# Patient Record
Sex: MALE | Race: WHITE | NOT HISPANIC OR LATINO | Employment: OTHER | URBAN - METROPOLITAN AREA
[De-identification: names, ages, dates, MRNs, and addresses within clinical notes are randomized per-mention and may not be internally consistent; named-entity substitution may affect disease eponyms.]

---

## 2019-09-18 ENCOUNTER — COMMUNICATION - HEALTHEAST (OUTPATIENT)
Dept: SCHEDULING | Facility: CLINIC | Age: 64
End: 2019-09-18

## 2019-10-02 ENCOUNTER — COMMUNICATION - HEALTHEAST (OUTPATIENT)
Dept: SCHEDULING | Facility: CLINIC | Age: 64
End: 2019-10-02

## 2019-10-10 ENCOUNTER — OFFICE VISIT - HEALTHEAST (OUTPATIENT)
Dept: FAMILY MEDICINE | Facility: CLINIC | Age: 64
End: 2019-10-10

## 2019-10-10 DIAGNOSIS — E78.00 PURE HYPERCHOLESTEROLEMIA: ICD-10-CM

## 2019-10-10 DIAGNOSIS — R73.9 HYPERGLYCEMIA: ICD-10-CM

## 2019-10-10 DIAGNOSIS — R80.8 OTHER PROTEINURIA: ICD-10-CM

## 2019-10-10 DIAGNOSIS — E11.9 TYPE 2 DIABETES MELLITUS WITHOUT COMPLICATION, WITHOUT LONG-TERM CURRENT USE OF INSULIN (H): ICD-10-CM

## 2019-10-10 DIAGNOSIS — I10 ESSENTIAL HYPERTENSION, BENIGN: ICD-10-CM

## 2019-10-10 DIAGNOSIS — I10 HTN (HYPERTENSION): ICD-10-CM

## 2019-10-10 DIAGNOSIS — Z12.11 SCREEN FOR COLON CANCER: ICD-10-CM

## 2019-10-10 LAB
ANION GAP SERPL CALCULATED.3IONS-SCNC: 9 MMOL/L (ref 5–18)
BUN SERPL-MCNC: 14 MG/DL (ref 8–22)
CALCIUM SERPL-MCNC: 9.9 MG/DL (ref 8.5–10.5)
CHLORIDE BLD-SCNC: 99 MMOL/L (ref 98–107)
CO2 SERPL-SCNC: 26 MMOL/L (ref 22–31)
CREAT SERPL-MCNC: 1.02 MG/DL (ref 0.7–1.3)
CREAT UR-MCNC: 95.1 MG/DL
GFR SERPL CREATININE-BSD FRML MDRD: >60 ML/MIN/1.73M2
GLUCOSE BLD-MCNC: 220 MG/DL (ref 70–125)
HBA1C MFR BLD: 9 % (ref 3.5–6)
LDLC SERPL CALC-MCNC: 147 MG/DL
MICROALBUMIN UR-MCNC: 40.63 MG/DL (ref 0–1.99)
MICROALBUMIN/CREAT UR: 427.2 MG/G
POTASSIUM BLD-SCNC: 4.2 MMOL/L (ref 3.5–5)
SODIUM SERPL-SCNC: 134 MMOL/L (ref 136–145)

## 2019-10-10 ASSESSMENT — MIFFLIN-ST. JEOR: SCORE: 1612.93

## 2019-10-11 ENCOUNTER — COMMUNICATION - HEALTHEAST (OUTPATIENT)
Dept: FAMILY MEDICINE | Facility: CLINIC | Age: 64
End: 2019-10-11

## 2020-07-16 ENCOUNTER — OFFICE VISIT - HEALTHEAST (OUTPATIENT)
Dept: FAMILY MEDICINE | Facility: CLINIC | Age: 65
End: 2020-07-16

## 2020-07-16 DIAGNOSIS — E78.5 HYPERLIPIDEMIA, UNSPECIFIED HYPERLIPIDEMIA TYPE: ICD-10-CM

## 2020-07-16 DIAGNOSIS — I10 ESSENTIAL HYPERTENSION: ICD-10-CM

## 2020-07-16 DIAGNOSIS — I63.9 ACUTE EMBOLIC STROKE (H): ICD-10-CM

## 2020-07-16 DIAGNOSIS — E11.8 CONTROLLED TYPE 2 DIABETES MELLITUS WITH COMPLICATION, WITHOUT LONG-TERM CURRENT USE OF INSULIN (H): ICD-10-CM

## 2020-07-16 DIAGNOSIS — R80.8 OTHER PROTEINURIA: ICD-10-CM

## 2020-07-16 DIAGNOSIS — Z09 HOSPITAL DISCHARGE FOLLOW-UP: ICD-10-CM

## 2020-07-16 LAB
ANION GAP SERPL CALCULATED.3IONS-SCNC: 12 MMOL/L (ref 5–18)
BUN SERPL-MCNC: 20 MG/DL (ref 8–22)
CALCIUM SERPL-MCNC: 10.1 MG/DL (ref 8.5–10.5)
CHLORIDE BLD-SCNC: 99 MMOL/L (ref 98–107)
CO2 SERPL-SCNC: 28 MMOL/L (ref 22–31)
CREAT SERPL-MCNC: 1.14 MG/DL (ref 0.7–1.3)
ERYTHROCYTE [DISTWIDTH] IN BLOOD BY AUTOMATED COUNT: 11.9 % (ref 11–14.5)
GFR SERPL CREATININE-BSD FRML MDRD: >60 ML/MIN/1.73M2
GLUCOSE BLD-MCNC: 118 MG/DL (ref 70–125)
HCT VFR BLD AUTO: 47 % (ref 40–54)
HGB BLD-MCNC: 15.9 G/DL (ref 14–18)
MCH RBC QN AUTO: 28.9 PG (ref 27–34)
MCHC RBC AUTO-ENTMCNC: 33.9 G/DL (ref 32–36)
MCV RBC AUTO: 85 FL (ref 80–100)
PLATELET # BLD AUTO: 219 THOU/UL (ref 140–440)
PMV BLD AUTO: 8.3 FL (ref 7–10)
POTASSIUM BLD-SCNC: 4.2 MMOL/L (ref 3.5–5)
RBC # BLD AUTO: 5.51 MILL/UL (ref 4.4–6.2)
SODIUM SERPL-SCNC: 139 MMOL/L (ref 136–145)
WBC: 10.9 THOU/UL (ref 4–11)

## 2020-11-25 ENCOUNTER — OFFICE VISIT - HEALTHEAST (OUTPATIENT)
Dept: FAMILY MEDICINE | Facility: CLINIC | Age: 65
End: 2020-11-25

## 2020-11-25 DIAGNOSIS — E78.5 HYPERLIPIDEMIA, UNSPECIFIED HYPERLIPIDEMIA TYPE: ICD-10-CM

## 2020-11-25 DIAGNOSIS — Z12.11 SCREEN FOR COLON CANCER: ICD-10-CM

## 2020-11-25 DIAGNOSIS — Z86.69 STATUS POST SEIZURE: ICD-10-CM

## 2020-11-25 DIAGNOSIS — E11.8 CONTROLLED TYPE 2 DIABETES MELLITUS WITH COMPLICATION, WITHOUT LONG-TERM CURRENT USE OF INSULIN (H): ICD-10-CM

## 2020-11-25 DIAGNOSIS — E66.9 OBESITY: ICD-10-CM

## 2020-11-25 DIAGNOSIS — F32.9 MAJOR DEPRESSIVE DISORDER, REMISSION STATUS UNSPECIFIED, UNSPECIFIED WHETHER RECURRENT: ICD-10-CM

## 2020-11-25 DIAGNOSIS — Z86.16 HISTORY OF 2019 NOVEL CORONAVIRUS DISEASE (COVID-19): ICD-10-CM

## 2020-11-25 DIAGNOSIS — I10 ESSENTIAL HYPERTENSION: ICD-10-CM

## 2020-11-25 DIAGNOSIS — I63.9 ACUTE EMBOLIC STROKE (H): ICD-10-CM

## 2020-11-25 DIAGNOSIS — H91.93 DECREASED HEARING OF BOTH EARS: ICD-10-CM

## 2020-11-25 DIAGNOSIS — Z11.59 ENCOUNTER FOR HEPATITIS C SCREENING TEST FOR LOW RISK PATIENT: ICD-10-CM

## 2020-11-25 LAB
ALBUMIN SERPL-MCNC: 3.7 G/DL (ref 3.5–5)
ALP SERPL-CCNC: 83 U/L (ref 45–120)
ALT SERPL W P-5'-P-CCNC: 24 U/L (ref 0–45)
ANION GAP SERPL CALCULATED.3IONS-SCNC: 11 MMOL/L (ref 5–18)
AST SERPL W P-5'-P-CCNC: 15 U/L (ref 0–40)
BILIRUB SERPL-MCNC: 0.5 MG/DL (ref 0–1)
BUN SERPL-MCNC: 18 MG/DL (ref 8–22)
CALCIUM SERPL-MCNC: 9.5 MG/DL (ref 8.5–10.5)
CHLORIDE BLD-SCNC: 100 MMOL/L (ref 98–107)
CHOLEST SERPL-MCNC: 191 MG/DL
CO2 SERPL-SCNC: 27 MMOL/L (ref 22–31)
CREAT SERPL-MCNC: 1.01 MG/DL (ref 0.7–1.3)
CREAT UR-MCNC: 92.8 MG/DL
FASTING STATUS PATIENT QL REPORTED: YES
GFR SERPL CREATININE-BSD FRML MDRD: >60 ML/MIN/1.73M2
GLUCOSE BLD-MCNC: 157 MG/DL (ref 70–125)
HBA1C MFR BLD: 8.5 %
HDLC SERPL-MCNC: 55 MG/DL
LDLC SERPL CALC-MCNC: 109 MG/DL
MICROALBUMIN UR-MCNC: 52.97 MG/DL (ref 0–1.99)
MICROALBUMIN/CREAT UR: 570.8 MG/G
POTASSIUM BLD-SCNC: 4 MMOL/L (ref 3.5–5)
PROT SERPL-MCNC: 6.8 G/DL (ref 6–8)
SODIUM SERPL-SCNC: 138 MMOL/L (ref 136–145)
TRIGL SERPL-MCNC: 134 MG/DL

## 2020-11-25 ASSESSMENT — MIFFLIN-ST. JEOR: SCORE: 1644.68

## 2020-11-26 LAB — HCV AB SERPL QL IA: NEGATIVE

## 2020-12-02 ENCOUNTER — AMBULATORY - HEALTHEAST (OUTPATIENT)
Dept: CARDIOLOGY | Facility: CLINIC | Age: 65
End: 2020-12-02

## 2020-12-03 ENCOUNTER — COMMUNICATION - HEALTHEAST (OUTPATIENT)
Dept: FAMILY MEDICINE | Facility: CLINIC | Age: 65
End: 2020-12-03

## 2020-12-03 ENCOUNTER — RECORDS - HEALTHEAST (OUTPATIENT)
Dept: ADMINISTRATIVE | Facility: OTHER | Age: 65
End: 2020-12-03

## 2020-12-04 ENCOUNTER — COMMUNICATION - HEALTHEAST (OUTPATIENT)
Dept: CARDIOLOGY | Facility: CLINIC | Age: 65
End: 2020-12-04

## 2020-12-04 ENCOUNTER — COMMUNICATION - HEALTHEAST (OUTPATIENT)
Dept: FAMILY MEDICINE | Facility: CLINIC | Age: 65
End: 2020-12-04

## 2020-12-04 ENCOUNTER — COMMUNICATION - HEALTHEAST (OUTPATIENT)
Dept: ADMINISTRATIVE | Facility: CLINIC | Age: 65
End: 2020-12-04

## 2020-12-07 ENCOUNTER — OFFICE VISIT - HEALTHEAST (OUTPATIENT)
Dept: CARDIOLOGY | Facility: CLINIC | Age: 65
End: 2020-12-07

## 2020-12-07 ENCOUNTER — COMMUNICATION - HEALTHEAST (OUTPATIENT)
Dept: FAMILY MEDICINE | Facility: CLINIC | Age: 65
End: 2020-12-07

## 2020-12-07 DIAGNOSIS — R55 SYNCOPE, UNSPECIFIED SYNCOPE TYPE: ICD-10-CM

## 2020-12-07 ASSESSMENT — MIFFLIN-ST. JEOR: SCORE: 1640.15

## 2020-12-29 ENCOUNTER — HOSPITAL ENCOUNTER (OUTPATIENT)
Dept: CARDIOLOGY | Facility: HOSPITAL | Age: 65
Discharge: HOME | End: 2020-12-29
Attending: INTERNAL MEDICINE

## 2020-12-29 DIAGNOSIS — R55 SYNCOPE, UNSPECIFIED SYNCOPE TYPE: ICD-10-CM

## 2020-12-29 LAB
CV STRESS CURRENT BP HE: NORMAL
CV STRESS CURRENT HR HE: 101
CV STRESS CURRENT HR HE: 105
CV STRESS CURRENT HR HE: 108
CV STRESS CURRENT HR HE: 113
CV STRESS CURRENT HR HE: 113
CV STRESS CURRENT HR HE: 117
CV STRESS CURRENT HR HE: 120
CV STRESS CURRENT HR HE: 126
CV STRESS CURRENT HR HE: 130
CV STRESS CURRENT HR HE: 133
CV STRESS CURRENT HR HE: 138
CV STRESS CURRENT HR HE: 140
CV STRESS CURRENT HR HE: 141
CV STRESS CURRENT HR HE: 142
CV STRESS CURRENT HR HE: 71
CV STRESS CURRENT HR HE: 74
CV STRESS CURRENT HR HE: 77
CV STRESS CURRENT HR HE: 86
CV STRESS CURRENT HR HE: 87
CV STRESS CURRENT HR HE: 87
CV STRESS CURRENT HR HE: 89
CV STRESS CURRENT HR HE: 90
CV STRESS CURRENT HR HE: 91
CV STRESS CURRENT HR HE: 91
CV STRESS CURRENT HR HE: 92
CV STRESS CURRENT HR HE: 97
CV STRESS CURRENT HR HE: 97
CV STRESS CURRENT HR HE: 99
CV STRESS DEVIATION TIME HE: NORMAL
CV STRESS ECHO PERCENT HR HE: NORMAL
CV STRESS EXERCISE STAGE HE: NORMAL
CV STRESS FINAL RESTING BP HE: NORMAL
CV STRESS FINAL RESTING HR HE: 86
CV STRESS MAX HR HE: 150
CV STRESS MAX TREADMILL GRADE HE: 14
CV STRESS MAX TREADMILL SPEED HE: 3.4
CV STRESS PEAK DIA BP HE: NORMAL
CV STRESS PEAK SYS BP HE: NORMAL
CV STRESS PHASE HE: NORMAL
CV STRESS PROTOCOL HE: NORMAL
CV STRESS RESTING PT POSITION HE: NORMAL
CV STRESS ST DEVIATION AMOUNT HE: NORMAL
CV STRESS ST DEVIATION ELEVATION HE: NORMAL
CV STRESS ST EVELATION AMOUNT HE: NORMAL
CV STRESS TEST TYPE HE: NORMAL
CV STRESS TOTAL STAGE TIME MIN 1 HE: NORMAL
RATE PRESSURE PRODUCT: NORMAL
STRESS ECHO BASELINE DIASTOLIC HE: 90
STRESS ECHO BASELINE HR: 75
STRESS ECHO BASELINE SYSTOLIC BP: 152
STRESS ECHO CALCULATED PERCENT HR: 97 %
STRESS ECHO LAST STRESS DIASTOLIC BP: 80
STRESS ECHO LAST STRESS HR: 142
STRESS ECHO LAST STRESS SYSTOLIC BP: 188
STRESS ECHO POST ESTIMATED WORKLOAD: 7.3
STRESS ECHO POST EXERCISE DUR MIN: 6
STRESS ECHO POST EXERCISE DUR SEC: 1
STRESS ECHO TARGET HR: 155

## 2020-12-31 ENCOUNTER — COMMUNICATION - HEALTHEAST (OUTPATIENT)
Dept: CARDIOLOGY | Facility: CLINIC | Age: 65
End: 2020-12-31

## 2021-01-01 ENCOUNTER — COMMUNICATION - HEALTHEAST (OUTPATIENT)
Dept: FAMILY MEDICINE | Facility: CLINIC | Age: 66
End: 2021-01-01

## 2021-01-01 ENCOUNTER — ANESTHESIA EVENT (OUTPATIENT)
Dept: SURGERY | Facility: HOSPITAL | Age: 66
DRG: 039 | End: 2021-01-01
Payer: COMMERCIAL

## 2021-01-01 ENCOUNTER — TRANSFERRED RECORDS (OUTPATIENT)
Dept: HEALTH INFORMATION MANAGEMENT | Facility: CLINIC | Age: 66
End: 2021-01-01
Payer: COMMERCIAL

## 2021-01-01 ENCOUNTER — APPOINTMENT (OUTPATIENT)
Dept: NEUROLOGY | Facility: HOSPITAL | Age: 66
DRG: 039 | End: 2021-01-01
Attending: INTERNAL MEDICINE
Payer: COMMERCIAL

## 2021-01-01 ENCOUNTER — OFFICE VISIT (OUTPATIENT)
Dept: VASCULAR SURGERY | Facility: CLINIC | Age: 66
End: 2021-01-01
Attending: SURGERY
Payer: COMMERCIAL

## 2021-01-01 ENCOUNTER — AMBULATORY - HEALTHEAST (OUTPATIENT)
Dept: NURSING | Facility: CLINIC | Age: 66
End: 2021-01-01

## 2021-01-01 ENCOUNTER — TELEPHONE (OUTPATIENT)
Dept: FAMILY MEDICINE | Facility: CLINIC | Age: 66
End: 2021-01-01

## 2021-01-01 ENCOUNTER — APPOINTMENT (OUTPATIENT)
Dept: SPEECH THERAPY | Facility: HOSPITAL | Age: 66
DRG: 039 | End: 2021-01-01
Attending: INTERNAL MEDICINE
Payer: COMMERCIAL

## 2021-01-01 ENCOUNTER — NURSE TRIAGE (OUTPATIENT)
Dept: NURSING | Facility: CLINIC | Age: 66
End: 2021-01-01
Payer: COMMERCIAL

## 2021-01-01 ENCOUNTER — VIRTUAL VISIT (OUTPATIENT)
Dept: EDUCATION SERVICES | Facility: CLINIC | Age: 66
End: 2021-01-01
Payer: COMMERCIAL

## 2021-01-01 ENCOUNTER — RECORDS - HEALTHEAST (OUTPATIENT)
Dept: ADMINISTRATIVE | Facility: OTHER | Age: 66
End: 2021-01-01

## 2021-01-01 ENCOUNTER — APPOINTMENT (OUTPATIENT)
Dept: SPEECH THERAPY | Facility: HOSPITAL | Age: 66
DRG: 039 | End: 2021-01-01
Payer: COMMERCIAL

## 2021-01-01 ENCOUNTER — APPOINTMENT (OUTPATIENT)
Dept: PHYSICAL THERAPY | Facility: HOSPITAL | Age: 66
DRG: 039 | End: 2021-01-01
Attending: INTERNAL MEDICINE
Payer: COMMERCIAL

## 2021-01-01 ENCOUNTER — OFFICE VISIT (OUTPATIENT)
Dept: FAMILY MEDICINE | Facility: CLINIC | Age: 66
End: 2021-01-01
Payer: COMMERCIAL

## 2021-01-01 ENCOUNTER — OFFICE VISIT - HEALTHEAST (OUTPATIENT)
Dept: FAMILY MEDICINE | Facility: CLINIC | Age: 66
End: 2021-01-01

## 2021-01-01 ENCOUNTER — APPOINTMENT (OUTPATIENT)
Dept: GENERAL RADIOLOGY | Facility: CLINIC | Age: 66
DRG: 101 | End: 2021-01-01
Attending: FAMILY MEDICINE
Payer: COMMERCIAL

## 2021-01-01 ENCOUNTER — APPOINTMENT (OUTPATIENT)
Dept: CT IMAGING | Facility: CLINIC | Age: 66
DRG: 101 | End: 2021-01-01
Attending: EMERGENCY MEDICINE
Payer: COMMERCIAL

## 2021-01-01 ENCOUNTER — AMBULATORY - HEALTHEAST (OUTPATIENT)
Dept: FAMILY MEDICINE | Facility: CLINIC | Age: 66
End: 2021-01-01

## 2021-01-01 ENCOUNTER — APPOINTMENT (OUTPATIENT)
Dept: ULTRASOUND IMAGING | Facility: HOSPITAL | Age: 66
DRG: 039 | End: 2021-01-01
Attending: INTERNAL MEDICINE
Payer: COMMERCIAL

## 2021-01-01 ENCOUNTER — PATIENT OUTREACH (OUTPATIENT)
Dept: CARE COORDINATION | Facility: CLINIC | Age: 66
End: 2021-01-01
Payer: COMMERCIAL

## 2021-01-01 ENCOUNTER — APPOINTMENT (OUTPATIENT)
Dept: CT IMAGING | Facility: HOSPITAL | Age: 66
DRG: 039 | End: 2021-01-01
Attending: EMERGENCY MEDICINE
Payer: COMMERCIAL

## 2021-01-01 ENCOUNTER — ANESTHESIA (OUTPATIENT)
Dept: SURGERY | Facility: HOSPITAL | Age: 66
DRG: 039 | End: 2021-01-01
Payer: COMMERCIAL

## 2021-01-01 ENCOUNTER — ANCILLARY PROCEDURE (OUTPATIENT)
Dept: ULTRASOUND IMAGING | Facility: HOSPITAL | Age: 66
DRG: 039 | End: 2021-01-01
Attending: ANESTHESIOLOGY
Payer: COMMERCIAL

## 2021-01-01 ENCOUNTER — HOSPITAL ENCOUNTER (INPATIENT)
Facility: HOSPITAL | Age: 66
LOS: 3 days | Discharge: HOME | DRG: 039 | End: 2021-11-06
Attending: EMERGENCY MEDICINE | Admitting: INTERNAL MEDICINE
Payer: COMMERCIAL

## 2021-01-01 ENCOUNTER — APPOINTMENT (OUTPATIENT)
Dept: CT IMAGING | Facility: CLINIC | Age: 66
DRG: 101 | End: 2021-01-01
Attending: FAMILY MEDICINE
Payer: COMMERCIAL

## 2021-01-01 ENCOUNTER — APPOINTMENT (OUTPATIENT)
Dept: MRI IMAGING | Facility: HOSPITAL | Age: 66
DRG: 039 | End: 2021-01-01
Attending: EMERGENCY MEDICINE
Payer: COMMERCIAL

## 2021-01-01 ENCOUNTER — TELEPHONE (OUTPATIENT)
Dept: NEUROLOGY | Facility: CLINIC | Age: 66
End: 2021-01-01

## 2021-01-01 ENCOUNTER — ANCILLARY PROCEDURE (OUTPATIENT)
Dept: VASCULAR ULTRASOUND | Facility: CLINIC | Age: 66
End: 2021-01-01
Attending: NURSE PRACTITIONER
Payer: COMMERCIAL

## 2021-01-01 ENCOUNTER — APPOINTMENT (OUTPATIENT)
Dept: CARDIOLOGY | Facility: HOSPITAL | Age: 66
DRG: 039 | End: 2021-01-01
Attending: INTERNAL MEDICINE
Payer: COMMERCIAL

## 2021-01-01 ENCOUNTER — RECORDS - HEALTHEAST (OUTPATIENT)
Dept: FAMILY MEDICINE | Facility: CLINIC | Age: 66
End: 2021-01-01

## 2021-01-01 ENCOUNTER — AMBULATORY - HEALTHEAST (OUTPATIENT)
Dept: MULTI SPECIALTY CLINIC | Facility: CLINIC | Age: 66
End: 2021-01-01

## 2021-01-01 ENCOUNTER — HOSPITAL ENCOUNTER (OUTPATIENT)
Facility: CLINIC | Age: 66
Setting detail: OBSERVATION
Discharge: ANOTHER HOSPITAL | DRG: 101 | End: 2021-06-28
Attending: EMERGENCY MEDICINE | Admitting: FAMILY MEDICINE
Payer: COMMERCIAL

## 2021-01-01 ENCOUNTER — TELEPHONE (OUTPATIENT)
Dept: SURGERY | Facility: CLINIC | Age: 66
End: 2021-01-01
Payer: COMMERCIAL

## 2021-01-01 ENCOUNTER — COMMUNICATION - HEALTHEAST (OUTPATIENT)
Dept: SCHEDULING | Facility: CLINIC | Age: 66
End: 2021-01-01

## 2021-01-01 ENCOUNTER — TELEPHONE (OUTPATIENT)
Dept: VASCULAR SURGERY | Facility: CLINIC | Age: 66
End: 2021-01-01
Payer: COMMERCIAL

## 2021-01-01 ENCOUNTER — MEDICAL CORRESPONDENCE (OUTPATIENT)
Dept: HEALTH INFORMATION MANAGEMENT | Facility: CLINIC | Age: 66
End: 2021-01-01
Payer: COMMERCIAL

## 2021-01-01 ENCOUNTER — APPOINTMENT (OUTPATIENT)
Dept: ULTRASOUND IMAGING | Facility: HOSPITAL | Age: 66
DRG: 039 | End: 2021-01-01
Attending: SURGERY
Payer: COMMERCIAL

## 2021-01-01 ENCOUNTER — TELEPHONE (OUTPATIENT)
Dept: FAMILY MEDICINE | Facility: CLINIC | Age: 66
End: 2021-01-01
Payer: COMMERCIAL

## 2021-01-01 VITALS
HEART RATE: 61 BPM | OXYGEN SATURATION: 95 % | WEIGHT: 198 LBS | BODY MASS INDEX: 29.24 KG/M2 | TEMPERATURE: 97.6 F | SYSTOLIC BLOOD PRESSURE: 147 MMHG | RESPIRATION RATE: 16 BRPM | DIASTOLIC BLOOD PRESSURE: 71 MMHG

## 2021-01-01 VITALS
BODY MASS INDEX: 29.09 KG/M2 | TEMPERATURE: 98.4 F | HEART RATE: 73 BPM | SYSTOLIC BLOOD PRESSURE: 142 MMHG | WEIGHT: 197 LBS | OXYGEN SATURATION: 98 % | DIASTOLIC BLOOD PRESSURE: 84 MMHG

## 2021-01-01 VITALS
HEART RATE: 78 BPM | BODY MASS INDEX: 29.82 KG/M2 | DIASTOLIC BLOOD PRESSURE: 90 MMHG | HEIGHT: 67 IN | SYSTOLIC BLOOD PRESSURE: 120 MMHG | OXYGEN SATURATION: 99 % | WEIGHT: 190 LBS

## 2021-01-01 VITALS
HEART RATE: 100 BPM | DIASTOLIC BLOOD PRESSURE: 111 MMHG | WEIGHT: 195 LBS | BODY MASS INDEX: 30.61 KG/M2 | SYSTOLIC BLOOD PRESSURE: 174 MMHG | HEIGHT: 67 IN | TEMPERATURE: 97.8 F | OXYGEN SATURATION: 98 % | RESPIRATION RATE: 18 BRPM

## 2021-01-01 VITALS
WEIGHT: 202.25 LBS | HEIGHT: 68 IN | HEART RATE: 79 BPM | TEMPERATURE: 97 F | DIASTOLIC BLOOD PRESSURE: 80 MMHG | SYSTOLIC BLOOD PRESSURE: 120 MMHG | OXYGEN SATURATION: 98 % | BODY MASS INDEX: 30.65 KG/M2

## 2021-01-01 VITALS
BODY MASS INDEX: 28.05 KG/M2 | HEART RATE: 88 BPM | RESPIRATION RATE: 16 BRPM | SYSTOLIC BLOOD PRESSURE: 112 MMHG | DIASTOLIC BLOOD PRESSURE: 76 MMHG | WEIGHT: 181.75 LBS

## 2021-01-01 VITALS
HEART RATE: 76 BPM | WEIGHT: 197 LBS | DIASTOLIC BLOOD PRESSURE: 90 MMHG | SYSTOLIC BLOOD PRESSURE: 162 MMHG | RESPIRATION RATE: 16 BRPM | BODY MASS INDEX: 29.86 KG/M2 | HEIGHT: 68 IN

## 2021-01-01 VITALS
WEIGHT: 195.33 LBS | SYSTOLIC BLOOD PRESSURE: 132 MMHG | BODY MASS INDEX: 28.93 KG/M2 | OXYGEN SATURATION: 92 % | TEMPERATURE: 96.6 F | HEIGHT: 69 IN | DIASTOLIC BLOOD PRESSURE: 72 MMHG | HEART RATE: 83 BPM | RESPIRATION RATE: 18 BRPM

## 2021-01-01 VITALS
WEIGHT: 191 LBS | BODY MASS INDEX: 28.95 KG/M2 | HEIGHT: 68 IN | DIASTOLIC BLOOD PRESSURE: 90 MMHG | HEART RATE: 76 BPM | RESPIRATION RATE: 16 BRPM | SYSTOLIC BLOOD PRESSURE: 142 MMHG

## 2021-01-01 VITALS
TEMPERATURE: 98 F | WEIGHT: 198 LBS | BODY MASS INDEX: 29.24 KG/M2 | RESPIRATION RATE: 20 BRPM | DIASTOLIC BLOOD PRESSURE: 98 MMHG | SYSTOLIC BLOOD PRESSURE: 172 MMHG | HEART RATE: 90 BPM

## 2021-01-01 VITALS
HEART RATE: 74 BPM | DIASTOLIC BLOOD PRESSURE: 99 MMHG | WEIGHT: 198 LBS | HEIGHT: 68 IN | BODY MASS INDEX: 30.01 KG/M2 | SYSTOLIC BLOOD PRESSURE: 176 MMHG

## 2021-01-01 DIAGNOSIS — I10 ESSENTIAL HYPERTENSION: ICD-10-CM

## 2021-01-01 DIAGNOSIS — E11.65 UNCONTROLLED TYPE 2 DIABETES MELLITUS WITH HYPERGLYCEMIA (H): ICD-10-CM

## 2021-01-01 DIAGNOSIS — E78.2 MIXED HYPERLIPIDEMIA: ICD-10-CM

## 2021-01-01 DIAGNOSIS — G40.909 SEIZURE DISORDER AS SEQUELA OF CEREBROVASCULAR ACCIDENT (H): ICD-10-CM

## 2021-01-01 DIAGNOSIS — E11.3313 TYPE 2 DIABETES MELLITUS WITH BOTH EYES AFFECTED BY MODERATE NONPROLIFERATIVE RETINOPATHY AND MACULAR EDEMA, WITHOUT LONG-TERM CURRENT USE OF INSULIN (H): ICD-10-CM

## 2021-01-01 DIAGNOSIS — I65.22 CAROTID STENOSIS, LEFT: ICD-10-CM

## 2021-01-01 DIAGNOSIS — I63.9 ACUTE EMBOLIC STROKE (H): ICD-10-CM

## 2021-01-01 DIAGNOSIS — R47.89 WORD FINDING DIFFICULTY: ICD-10-CM

## 2021-01-01 DIAGNOSIS — Z87.898 HISTORY OF SEIZURE: ICD-10-CM

## 2021-01-01 DIAGNOSIS — I65.22 CAROTID STENOSIS, LEFT: Primary | ICD-10-CM

## 2021-01-01 DIAGNOSIS — I69.398 SEIZURE DISORDER AS SEQUELA OF CEREBROVASCULAR ACCIDENT (H): ICD-10-CM

## 2021-01-01 DIAGNOSIS — Z86.73 HISTORY OF EMBOLIC STROKE: ICD-10-CM

## 2021-01-01 DIAGNOSIS — E11.311 DIABETIC MACULAR EDEMA OF BOTH EYES (H): ICD-10-CM

## 2021-01-01 DIAGNOSIS — E78.5 HYPERLIPIDEMIA, UNSPECIFIED HYPERLIPIDEMIA TYPE: ICD-10-CM

## 2021-01-01 DIAGNOSIS — I63.239 SYMPTOMATIC CAROTID ARTERY STENOSIS WITH INFARCTION (H): Primary | ICD-10-CM

## 2021-01-01 DIAGNOSIS — I15.9 SECONDARY HYPERTENSION: ICD-10-CM

## 2021-01-01 DIAGNOSIS — I63.9 ACUTE CVA (CEREBROVASCULAR ACCIDENT) (H): ICD-10-CM

## 2021-01-01 DIAGNOSIS — Z71.89 OTHER SPECIFIED COUNSELING: ICD-10-CM

## 2021-01-01 DIAGNOSIS — Z11.52 ENCOUNTER FOR SCREENING LABORATORY TESTING FOR SEVERE ACUTE RESPIRATORY SYNDROME CORONAVIRUS 2 (SARS-COV-2): ICD-10-CM

## 2021-01-01 DIAGNOSIS — R56.9 SEIZURE (H): ICD-10-CM

## 2021-01-01 DIAGNOSIS — R80.8 OTHER PROTEINURIA: ICD-10-CM

## 2021-01-01 DIAGNOSIS — I63.239 SYMPTOMATIC CAROTID ARTERY STENOSIS WITH INFARCTION (H): ICD-10-CM

## 2021-01-01 DIAGNOSIS — Z13.6 ENCOUNTER FOR ABDOMINAL AORTIC ANEURYSM (AAA) SCREENING: ICD-10-CM

## 2021-01-01 DIAGNOSIS — E11.3313 TYPE 2 DIABETES MELLITUS WITH BOTH EYES AFFECTED BY MODERATE NONPROLIFERATIVE RETINOPATHY AND MACULAR EDEMA, WITHOUT LONG-TERM CURRENT USE OF INSULIN (H): Primary | ICD-10-CM

## 2021-01-01 DIAGNOSIS — I10 PRIMARY HYPERTENSION: ICD-10-CM

## 2021-01-01 DIAGNOSIS — Z09 HOSPITAL DISCHARGE FOLLOW-UP: Primary | ICD-10-CM

## 2021-01-01 DIAGNOSIS — G89.18 ACUTE POST-OPERATIVE PAIN: ICD-10-CM

## 2021-01-01 DIAGNOSIS — I65.22 ATHEROSCLEROSIS OF LEFT CAROTID ARTERY: ICD-10-CM

## 2021-01-01 DIAGNOSIS — G93.40 ACUTE ENCEPHALOPATHY: Primary | ICD-10-CM

## 2021-01-01 DIAGNOSIS — Z53.9 DIAGNOSIS NOT YET DEFINED: Primary | ICD-10-CM

## 2021-01-01 DIAGNOSIS — G93.40 ACUTE ENCEPHALOPATHY: ICD-10-CM

## 2021-01-01 LAB
ABO/RH(D): NORMAL
ALBUMIN SERPL-MCNC: 2.9 G/DL (ref 3.5–5)
ALBUMIN SERPL-MCNC: 3.3 G/DL (ref 3.4–5)
ALBUMIN SERPL-MCNC: 3.3 G/DL (ref 3.4–5)
ALP SERPL-CCNC: 54 U/L (ref 45–120)
ALP SERPL-CCNC: 77 U/L (ref 40–150)
ALP SERPL-CCNC: 85 U/L (ref 40–150)
ALT SERPL W P-5'-P-CCNC: 15 U/L (ref 0–45)
ALT SERPL W P-5'-P-CCNC: 21 U/L (ref 0–70)
ALT SERPL W P-5'-P-CCNC: 22 U/L (ref 0–70)
AMMONIA PLAS-SCNC: 10 UMOL/L (ref 10–50)
ANION GAP SERPL CALCULATED.3IONS-SCNC: 10 MMOL/L (ref 5–18)
ANION GAP SERPL CALCULATED.3IONS-SCNC: 5 MMOL/L (ref 5–18)
ANION GAP SERPL CALCULATED.3IONS-SCNC: 6 MMOL/L (ref 3–14)
ANION GAP SERPL CALCULATED.3IONS-SCNC: 6 MMOL/L (ref 3–14)
ANION GAP SERPL CALCULATED.3IONS-SCNC: 6 MMOL/L (ref 5–18)
ANION GAP SERPL CALCULATED.3IONS-SCNC: 7 MMOL/L (ref 5–18)
ANION GAP SERPL CALCULATED.3IONS-SCNC: 8 MMOL/L (ref 3–14)
ANION GAP SERPL CALCULATED.3IONS-SCNC: 8 MMOL/L (ref 5–18)
ANTIBODY SCREEN: NEGATIVE
APTT PPP: 26 SECONDS (ref 22–38)
APTT PPP: 31 SEC (ref 22–37)
AST SERPL W P-5'-P-CCNC: 14 U/L (ref 0–45)
AST SERPL W P-5'-P-CCNC: 16 U/L (ref 0–40)
AST SERPL W P-5'-P-CCNC: 17 U/L (ref 0–45)
ATRIAL RATE - MUSE: 80 BPM
ATRIAL RATE - MUSE: 82 BPM
BASE EXCESS BLDV CALC-SCNC: 2.6 MMOL/L
BASOPHILS # BLD AUTO: 0 10E3/UL (ref 0–0.2)
BASOPHILS # BLD AUTO: 0.1 10E9/L (ref 0–0.2)
BASOPHILS NFR BLD AUTO: 0 %
BASOPHILS NFR BLD AUTO: 0.6 %
BASOPHILS NFR BLD AUTO: 0.8 %
BASOPHILS NFR BLD AUTO: 1 %
BILIRUB SERPL-MCNC: 0.5 MG/DL (ref 0.2–1.3)
BILIRUB SERPL-MCNC: 0.6 MG/DL (ref 0.2–1.3)
BILIRUB SERPL-MCNC: 0.7 MG/DL (ref 0–1)
BUN SERPL-MCNC: 13 MG/DL (ref 7–30)
BUN SERPL-MCNC: 14 MG/DL (ref 7–30)
BUN SERPL-MCNC: 17 MG/DL (ref 8–22)
BUN SERPL-MCNC: 17 MG/DL (ref 8–22)
BUN SERPL-MCNC: 18 MG/DL (ref 7–30)
BUN SERPL-MCNC: 19 MG/DL (ref 8–22)
BUN SERPL-MCNC: 19 MG/DL (ref 8–22)
BUN SERPL-MCNC: 20 MG/DL (ref 8–22)
CALCIUM SERPL-MCNC: 7.8 MG/DL (ref 8.5–10.5)
CALCIUM SERPL-MCNC: 8.1 MG/DL (ref 8.5–10.5)
CALCIUM SERPL-MCNC: 8.5 MG/DL (ref 8.5–10.1)
CALCIUM SERPL-MCNC: 8.8 MG/DL (ref 8.5–10.1)
CALCIUM SERPL-MCNC: 9.3 MG/DL (ref 8.5–10.5)
CALCIUM SERPL-MCNC: 9.4 MG/DL (ref 8.5–10.1)
CALCIUM SERPL-MCNC: 9.5 MG/DL (ref 8.5–10.5)
CALCIUM SERPL-MCNC: 9.6 MG/DL (ref 8.5–10.5)
CHLORIDE BLD-SCNC: 105 MMOL/L (ref 98–107)
CHLORIDE BLD-SCNC: 106 MMOL/L (ref 98–107)
CHLORIDE BLD-SCNC: 107 MMOL/L (ref 98–107)
CHLORIDE BLD-SCNC: 97 MMOL/L (ref 98–107)
CHLORIDE BLD-SCNC: 99 MMOL/L (ref 98–107)
CHLORIDE SERPL-SCNC: 102 MMOL/L (ref 94–109)
CHLORIDE SERPL-SCNC: 104 MMOL/L (ref 94–109)
CHLORIDE SERPL-SCNC: 106 MMOL/L (ref 94–109)
CHOLEST SERPL-MCNC: 197 MG/DL
CO2 SERPL-SCNC: 23 MMOL/L (ref 20–32)
CO2 SERPL-SCNC: 23 MMOL/L (ref 22–31)
CO2 SERPL-SCNC: 24 MMOL/L (ref 20–32)
CO2 SERPL-SCNC: 24 MMOL/L (ref 22–31)
CO2 SERPL-SCNC: 26 MMOL/L (ref 20–32)
CO2 SERPL-SCNC: 26 MMOL/L (ref 22–31)
CO2 SERPL-SCNC: 26 MMOL/L (ref 22–31)
CO2 SERPL-SCNC: 28 MMOL/L (ref 22–31)
CREAT BLD-MCNC: 1.1 MG/DL (ref 0.7–1.3)
CREAT SERPL-MCNC: 0.83 MG/DL (ref 0.66–1.25)
CREAT SERPL-MCNC: 0.92 MG/DL (ref 0.7–1.3)
CREAT SERPL-MCNC: 0.93 MG/DL (ref 0.7–1.3)
CREAT SERPL-MCNC: 0.93 MG/DL (ref 0.7–1.3)
CREAT SERPL-MCNC: 0.97 MG/DL (ref 0.66–1.25)
CREAT SERPL-MCNC: 0.99 MG/DL (ref 0.66–1.25)
CREAT SERPL-MCNC: 1.15 MG/DL (ref 0.7–1.3)
CREAT SERPL-MCNC: 1.3 MG/DL (ref 0.7–1.3)
D DIMER PPP FEU-MCNC: 0.3 UG/ML FEU (ref 0–0.5)
DIASTOLIC BLOOD PRESSURE - MUSE: 97 MMHG
DIASTOLIC BLOOD PRESSURE - MUSE: 97 MMHG
DIFFERENTIAL METHOD BLD: ABNORMAL
DIFFERENTIAL METHOD BLD: NORMAL
DIFFERENTIAL METHOD BLD: NORMAL
EOSINOPHIL # BLD AUTO: 0 10E3/UL (ref 0–0.7)
EOSINOPHIL # BLD AUTO: 0 10E9/L (ref 0–0.7)
EOSINOPHIL # BLD AUTO: 0.1 10E9/L (ref 0–0.7)
EOSINOPHIL # BLD AUTO: 0.1 10E9/L (ref 0–0.7)
EOSINOPHIL NFR BLD AUTO: 0 %
EOSINOPHIL NFR BLD AUTO: 0.2 %
EOSINOPHIL NFR BLD AUTO: 0.7 %
EOSINOPHIL NFR BLD AUTO: 0.7 %
ERYTHROCYTE [DISTWIDTH] IN BLOOD BY AUTOMATED COUNT: 13.2 % (ref 10–15)
ERYTHROCYTE [DISTWIDTH] IN BLOOD BY AUTOMATED COUNT: 13.2 % (ref 10–15)
ERYTHROCYTE [DISTWIDTH] IN BLOOD BY AUTOMATED COUNT: 13.3 % (ref 10–15)
ERYTHROCYTE [DISTWIDTH] IN BLOOD BY AUTOMATED COUNT: 13.4 % (ref 10–15)
GFR SERPL CREATININE-BSD FRML MDRD: 57 ML/MIN/1.73M2
GFR SERPL CREATININE-BSD FRML MDRD: 66 ML/MIN/1.73M2
GFR SERPL CREATININE-BSD FRML MDRD: 79 ML/MIN/{1.73_M2}
GFR SERPL CREATININE-BSD FRML MDRD: 81 ML/MIN/{1.73_M2}
GFR SERPL CREATININE-BSD FRML MDRD: 85 ML/MIN/1.73M2
GFR SERPL CREATININE-BSD FRML MDRD: 85 ML/MIN/1.73M2
GFR SERPL CREATININE-BSD FRML MDRD: 86 ML/MIN/1.73M2
GFR SERPL CREATININE-BSD FRML MDRD: >60 ML/MIN/1.73M2
GFR SERPL CREATININE-BSD FRML MDRD: >90 ML/MIN/{1.73_M2}
GLUCOSE BLD-MCNC: 158 MG/DL (ref 70–125)
GLUCOSE BLD-MCNC: 181 MG/DL (ref 70–125)
GLUCOSE BLD-MCNC: 191 MG/DL (ref 70–125)
GLUCOSE BLD-MCNC: 329 MG/DL (ref 70–125)
GLUCOSE BLD-MCNC: 378 MG/DL (ref 70–125)
GLUCOSE BLDC GLUCOMTR-MCNC: 103 MG/DL (ref 70–99)
GLUCOSE BLDC GLUCOMTR-MCNC: 107 MG/DL (ref 70–99)
GLUCOSE BLDC GLUCOMTR-MCNC: 120 MG/DL (ref 70–99)
GLUCOSE BLDC GLUCOMTR-MCNC: 147 MG/DL (ref 70–99)
GLUCOSE BLDC GLUCOMTR-MCNC: 147 MG/DL (ref 70–99)
GLUCOSE BLDC GLUCOMTR-MCNC: 164 MG/DL (ref 70–99)
GLUCOSE BLDC GLUCOMTR-MCNC: 167 MG/DL (ref 70–99)
GLUCOSE BLDC GLUCOMTR-MCNC: 168 MG/DL (ref 70–99)
GLUCOSE BLDC GLUCOMTR-MCNC: 169 MG/DL (ref 70–99)
GLUCOSE BLDC GLUCOMTR-MCNC: 172 MG/DL (ref 70–99)
GLUCOSE BLDC GLUCOMTR-MCNC: 172 MG/DL (ref 70–99)
GLUCOSE BLDC GLUCOMTR-MCNC: 178 MG/DL (ref 70–99)
GLUCOSE BLDC GLUCOMTR-MCNC: 179 MG/DL (ref 70–99)
GLUCOSE BLDC GLUCOMTR-MCNC: 181 MG/DL (ref 70–99)
GLUCOSE BLDC GLUCOMTR-MCNC: 190 MG/DL (ref 70–99)
GLUCOSE BLDC GLUCOMTR-MCNC: 191 MG/DL (ref 70–99)
GLUCOSE BLDC GLUCOMTR-MCNC: 198 MG/DL (ref 70–99)
GLUCOSE BLDC GLUCOMTR-MCNC: 213 MG/DL (ref 70–99)
GLUCOSE BLDC GLUCOMTR-MCNC: 216 MG/DL (ref 70–99)
GLUCOSE BLDC GLUCOMTR-MCNC: 229 MG/DL (ref 70–99)
GLUCOSE BLDC GLUCOMTR-MCNC: 246 MG/DL (ref 70–99)
GLUCOSE BLDC GLUCOMTR-MCNC: 247 MG/DL (ref 70–99)
GLUCOSE BLDC GLUCOMTR-MCNC: 255 MG/DL (ref 70–99)
GLUCOSE BLDC GLUCOMTR-MCNC: 267 MG/DL (ref 70–99)
GLUCOSE BLDC GLUCOMTR-MCNC: 329 MG/DL (ref 70–99)
GLUCOSE SERPL-MCNC: 143 MG/DL (ref 70–99)
GLUCOSE SERPL-MCNC: 257 MG/DL (ref 70–99)
GLUCOSE SERPL-MCNC: 267 MG/DL (ref 70–99)
HBA1C MFR BLD: 10.3 % (ref 0–5.6)
HBA1C MFR BLD: 11.7 % (ref 0–5.6)
HBA1C MFR BLD: 9.4 %
HBA1C MFR BLD: 9.9 %
HCO3 BLDV-SCNC: 27 MMOL/L (ref 21–28)
HCT VFR BLD AUTO: 38.4 % (ref 40–53)
HCT VFR BLD AUTO: 44.3 % (ref 40–53)
HCT VFR BLD AUTO: 44.9 % (ref 40–53)
HCT VFR BLD AUTO: 45.3 % (ref 40–53)
HCT VFR BLD AUTO: 46.6 % (ref 40–53)
HCT VFR BLD AUTO: 49.5 % (ref 40–53)
HDLC SERPL-MCNC: 57 MG/DL
HGB BLD-MCNC: 12.8 G/DL (ref 13.3–17.7)
HGB BLD-MCNC: 14.6 G/DL (ref 13.3–17.7)
HGB BLD-MCNC: 14.8 G/DL (ref 13.3–17.7)
HGB BLD-MCNC: 14.8 G/DL (ref 13.3–17.7)
HGB BLD-MCNC: 15.2 G/DL (ref 13.3–17.7)
HGB BLD-MCNC: 16.3 G/DL (ref 13.3–17.7)
HOLD SPECIMEN: NORMAL
IMM GRANULOCYTES # BLD: 0 10E9/L (ref 0–0.4)
IMM GRANULOCYTES # BLD: 0.1 10E3/UL
IMM GRANULOCYTES # BLD: 0.1 10E9/L (ref 0–0.4)
IMM GRANULOCYTES # BLD: 0.1 10E9/L (ref 0–0.4)
IMM GRANULOCYTES NFR BLD: 0.5 %
IMM GRANULOCYTES NFR BLD: 0.6 %
IMM GRANULOCYTES NFR BLD: 0.7 %
IMM GRANULOCYTES NFR BLD: 1 %
INR PPP: 1.02 (ref 0.86–1.14)
INR PPP: 1.03 (ref 0.85–1.15)
INTERPRETATION ECG - MUSE: NORMAL
INTERPRETATION ECG - MUSE: NORMAL
LABORATORY COMMENT REPORT: NORMAL
LACTATE BLD-SCNC: 1.4 MMOL/L (ref 0.7–2)
LDLC SERPL CALC-MCNC: 118 MG/DL
LEVETIRACETAM (KEPPRA): 13.7 UG/ML (ref 6–46)
LEVETIRACETAM (KEPPRA): 5.7 UG/ML (ref 6–46)
LEVETIRACETAM SERPL-MCNC: 23 UG/ML (ref 12–46)
LVEF ECHO: NORMAL
LYMPHOCYTES # BLD AUTO: 0.7 10E3/UL (ref 0.8–5.3)
LYMPHOCYTES # BLD AUTO: 1.5 10E9/L (ref 0.8–5.3)
LYMPHOCYTES # BLD AUTO: 1.5 10E9/L (ref 0.8–5.3)
LYMPHOCYTES # BLD AUTO: 1.7 10E9/L (ref 0.8–5.3)
LYMPHOCYTES NFR BLD AUTO: 13.2 %
LYMPHOCYTES NFR BLD AUTO: 18.2 %
LYMPHOCYTES NFR BLD AUTO: 21.7 %
LYMPHOCYTES NFR BLD AUTO: 7 %
MAGNESIUM SERPL-MCNC: 1.6 MG/DL (ref 1.8–2.6)
MAGNESIUM SERPL-MCNC: 1.8 MG/DL (ref 1.8–2.6)
MAGNESIUM SERPL-MCNC: 1.9 MG/DL (ref 1.8–2.6)
MAGNESIUM SERPL-MCNC: 2 MG/DL (ref 1.8–2.6)
MAGNESIUM SERPL-MCNC: 2.2 MG/DL (ref 1.8–2.6)
MCH RBC QN AUTO: 27.1 PG (ref 26.5–33)
MCH RBC QN AUTO: 27.4 PG (ref 26.5–33)
MCH RBC QN AUTO: 27.4 PG (ref 26.5–33)
MCH RBC QN AUTO: 27.7 PG (ref 26.5–33)
MCH RBC QN AUTO: 27.8 PG (ref 26.5–33)
MCH RBC QN AUTO: 28.2 PG (ref 26.5–33)
MCHC RBC AUTO-ENTMCNC: 32.5 G/DL (ref 31.5–36.5)
MCHC RBC AUTO-ENTMCNC: 32.6 G/DL (ref 31.5–36.5)
MCHC RBC AUTO-ENTMCNC: 32.7 G/DL (ref 31.5–36.5)
MCHC RBC AUTO-ENTMCNC: 32.9 G/DL (ref 31.5–36.5)
MCHC RBC AUTO-ENTMCNC: 33.3 G/DL (ref 31.5–36.5)
MCHC RBC AUTO-ENTMCNC: 33.4 G/DL (ref 31.5–36.5)
MCV RBC AUTO: 83 FL (ref 78–100)
MCV RBC AUTO: 84 FL (ref 78–100)
MCV RBC AUTO: 85 FL (ref 78–100)
MCV RBC AUTO: 85 FL (ref 78–100)
MONOCYTES # BLD AUTO: 0.4 10E3/UL (ref 0–1.3)
MONOCYTES # BLD AUTO: 0.4 10E9/L (ref 0–1.3)
MONOCYTES # BLD AUTO: 0.7 10E9/L (ref 0–1.3)
MONOCYTES # BLD AUTO: 0.7 10E9/L (ref 0–1.3)
MONOCYTES NFR BLD AUTO: 4 %
MONOCYTES NFR BLD AUTO: 5.9 %
MONOCYTES NFR BLD AUTO: 6 %
MONOCYTES NFR BLD AUTO: 8.1 %
NEUTROPHILS # BLD AUTO: 4.9 10E9/L (ref 1.6–8.3)
NEUTROPHILS # BLD AUTO: 6.5 10E9/L (ref 1.6–8.3)
NEUTROPHILS # BLD AUTO: 8.8 10E9/L (ref 1.6–8.3)
NEUTROPHILS # BLD AUTO: 9.6 10E3/UL (ref 1.6–8.3)
NEUTROPHILS NFR BLD AUTO: 70 %
NEUTROPHILS NFR BLD AUTO: 71.5 %
NEUTROPHILS NFR BLD AUTO: 79.6 %
NEUTROPHILS NFR BLD AUTO: 88 %
NRBC # BLD AUTO: 0 10*3/UL
NRBC # BLD AUTO: 0 10E3/UL
NRBC BLD AUTO-RTO: 0 /100
O2/TOTAL GAS SETTING VFR VENT: NORMAL %
P AXIS - MUSE: 66 DEGREES
P AXIS - MUSE: 73 DEGREES
PATH REPORT.COMMENTS IMP SPEC: NORMAL
PATH REPORT.COMMENTS IMP SPEC: NORMAL
PATH REPORT.FINAL DX SPEC: NORMAL
PATH REPORT.GROSS SPEC: NORMAL
PATH REPORT.MICROSCOPIC SPEC OTHER STN: NORMAL
PATH REPORT.RELEVANT HX SPEC: NORMAL
PCO2 BLDV: 41 MM HG (ref 40–50)
PH BLDV: 7.43 PH (ref 7.32–7.43)
PHOTO IMAGE: NORMAL
PLATELET # BLD AUTO: 177 10E3/UL (ref 150–450)
PLATELET # BLD AUTO: 190 10E3/UL (ref 150–450)
PLATELET # BLD AUTO: 203 10E9/L (ref 150–450)
PLATELET # BLD AUTO: 204 10E3/UL (ref 150–450)
PLATELET # BLD AUTO: 209 10E9/L (ref 150–450)
PLATELET # BLD AUTO: 210 10E9/L (ref 150–450)
PLATELET # BLD AUTO: 219 10E3/UL (ref 150–450)
PLATELET # BLD AUTO: 222 10E3/UL (ref 150–450)
PO2 BLDV: 42 MM HG (ref 25–47)
POTASSIUM BLD-SCNC: 3.8 MMOL/L (ref 3.5–5)
POTASSIUM BLD-SCNC: 4.1 MMOL/L (ref 3.5–5)
POTASSIUM BLD-SCNC: 4.2 MMOL/L (ref 3.5–5)
POTASSIUM BLD-SCNC: 4.3 MMOL/L (ref 3.5–5)
POTASSIUM BLD-SCNC: 4.5 MMOL/L (ref 3.5–5)
POTASSIUM SERPL-SCNC: 3.6 MMOL/L (ref 3.4–5.3)
POTASSIUM SERPL-SCNC: 4.1 MMOL/L (ref 3.4–5.3)
POTASSIUM SERPL-SCNC: 4.1 MMOL/L (ref 3.4–5.3)
PR INTERVAL - MUSE: 168 MS
PR INTERVAL - MUSE: 172 MS
PROT SERPL-MCNC: 6 G/DL (ref 6–8)
PROT SERPL-MCNC: 7 G/DL (ref 6.8–8.8)
PROT SERPL-MCNC: 7.5 G/DL (ref 6.8–8.8)
QRS DURATION - MUSE: 76 MS
QRS DURATION - MUSE: 76 MS
QT - MUSE: 390 MS
QT - MUSE: 502 MS
QTC - MUSE: 455 MS
QTC - MUSE: 578 MS
R AXIS - MUSE: 56 DEGREES
R AXIS - MUSE: 59 DEGREES
RBC # BLD AUTO: 4.54 10E6/UL (ref 4.4–5.9)
RBC # BLD AUTO: 5.32 10E6/UL (ref 4.4–5.9)
RBC # BLD AUTO: 5.35 10E12/L (ref 4.4–5.9)
RBC # BLD AUTO: 5.39 10E12/L (ref 4.4–5.9)
RBC # BLD AUTO: 5.54 10E12/L (ref 4.4–5.9)
RBC # BLD AUTO: 5.95 10E6/UL (ref 4.4–5.9)
RETINOPATHY: NORMAL
SARS-COV-2 RNA RESP QL NAA+PROBE: NEGATIVE
SARS-COV-2 RNA RESP QL NAA+PROBE: NEGATIVE
SODIUM SERPL-SCNC: 133 MMOL/L (ref 136–145)
SODIUM SERPL-SCNC: 134 MMOL/L (ref 133–144)
SODIUM SERPL-SCNC: 134 MMOL/L (ref 133–144)
SODIUM SERPL-SCNC: 135 MMOL/L (ref 136–145)
SODIUM SERPL-SCNC: 136 MMOL/L (ref 136–145)
SODIUM SERPL-SCNC: 136 MMOL/L (ref 136–145)
SODIUM SERPL-SCNC: 137 MMOL/L (ref 133–144)
SODIUM SERPL-SCNC: 137 MMOL/L (ref 136–145)
SPECIMEN EXPIRATION DATE: NORMAL
SPECIMEN SOURCE: NORMAL
SYSTOLIC BLOOD PRESSURE - MUSE: 199 MMHG
SYSTOLIC BLOOD PRESSURE - MUSE: 216 MMHG
T AXIS - MUSE: 78 DEGREES
T AXIS - MUSE: 86 DEGREES
TRIGL SERPL-MCNC: 111 MG/DL
TROPONIN I SERPL-MCNC: 0.01 NG/ML (ref 0–0.29)
TROPONIN I SERPL-MCNC: <0.01 NG/ML (ref 0–0.29)
TROPONIN I SERPL-MCNC: <0.015 UG/L (ref 0–0.04)
TROPONIN I SERPL-MCNC: <0.015 UG/L (ref 0–0.04)
TSH SERPL DL<=0.005 MIU/L-ACNC: 1.63 MU/L (ref 0.4–4)
VENTRICULAR RATE- MUSE: 80 BPM
VENTRICULAR RATE- MUSE: 82 BPM
WBC # BLD AUTO: 10.8 10E3/UL (ref 4–11)
WBC # BLD AUTO: 11 10E9/L (ref 4–11)
WBC # BLD AUTO: 12.5 10E3/UL (ref 4–11)
WBC # BLD AUTO: 7 10E9/L (ref 4–11)
WBC # BLD AUTO: 8.1 10E3/UL (ref 4–11)
WBC # BLD AUTO: 9.1 10E9/L (ref 4–11)

## 2021-01-01 PROCEDURE — 250N000009 HC RX 250: Performed by: EMERGENCY MEDICINE

## 2021-01-01 PROCEDURE — 83735 ASSAY OF MAGNESIUM: CPT | Performed by: INTERNAL MEDICINE

## 2021-01-01 PROCEDURE — C9113 INJ PANTOPRAZOLE SODIUM, VIA: HCPCS | Performed by: INTERNAL MEDICINE

## 2021-01-01 PROCEDURE — 99207 PR INPT ADMISSION FROM CLINIC: CPT | Performed by: FAMILY MEDICINE

## 2021-01-01 PROCEDURE — 80053 COMPREHEN METABOLIC PANEL: CPT | Performed by: FAMILY MEDICINE

## 2021-01-01 PROCEDURE — 36415 COLL VENOUS BLD VENIPUNCTURE: CPT | Performed by: FAMILY MEDICINE

## 2021-01-01 PROCEDURE — 120N000001 HC R&B MED SURG/OB

## 2021-01-01 PROCEDURE — 250N000025 HC SEVOFLURANE, PER MIN: Performed by: SURGERY

## 2021-01-01 PROCEDURE — 258N000003 HC RX IP 258 OP 636: Performed by: SURGERY

## 2021-01-01 PROCEDURE — 99232 SBSQ HOSP IP/OBS MODERATE 35: CPT | Performed by: PSYCHIATRY & NEUROLOGY

## 2021-01-01 PROCEDURE — 36415 COLL VENOUS BLD VENIPUNCTURE: CPT | Performed by: PHYSICIAN ASSISTANT

## 2021-01-01 PROCEDURE — 88304 TISSUE EXAM BY PATHOLOGIST: CPT | Mod: 26 | Performed by: PATHOLOGY

## 2021-01-01 PROCEDURE — 85610 PROTHROMBIN TIME: CPT | Performed by: EMERGENCY MEDICINE

## 2021-01-01 PROCEDURE — 36415 COLL VENOUS BLD VENIPUNCTURE: CPT | Performed by: NURSE ANESTHETIST, CERTIFIED REGISTERED

## 2021-01-01 PROCEDURE — G0378 HOSPITAL OBSERVATION PER HR: HCPCS

## 2021-01-01 PROCEDURE — 85025 COMPLETE CBC W/AUTO DIFF WBC: CPT | Mod: 91 | Performed by: FAMILY MEDICINE

## 2021-01-01 PROCEDURE — 71045 X-RAY EXAM CHEST 1 VIEW: CPT

## 2021-01-01 PROCEDURE — 250N000013 HC RX MED GY IP 250 OP 250 PS 637: Performed by: SURGERY

## 2021-01-01 PROCEDURE — 250N000011 HC RX IP 250 OP 636: Performed by: FAMILY MEDICINE

## 2021-01-01 PROCEDURE — 80053 COMPREHEN METABOLIC PANEL: CPT | Performed by: INTERNAL MEDICINE

## 2021-01-01 PROCEDURE — 70496 CT ANGIOGRAPHY HEAD: CPT

## 2021-01-01 PROCEDURE — 85027 COMPLETE CBC AUTOMATED: CPT | Performed by: INTERNAL MEDICINE

## 2021-01-01 PROCEDURE — 96365 THER/PROPH/DIAG IV INF INIT: CPT | Mod: 59 | Performed by: EMERGENCY MEDICINE

## 2021-01-01 PROCEDURE — 250N000011 HC RX IP 250 OP 636: Performed by: INTERNAL MEDICINE

## 2021-01-01 PROCEDURE — 250N000011 HC RX IP 250 OP 636: Performed by: PHYSICIAN ASSISTANT

## 2021-01-01 PROCEDURE — 96360 HYDRATION IV INFUSION INIT: CPT | Mod: 59

## 2021-01-01 PROCEDURE — 250N000009 HC RX 250

## 2021-01-01 PROCEDURE — 250N000011 HC RX IP 250 OP 636: Performed by: EMERGENCY MEDICINE

## 2021-01-01 PROCEDURE — 82310 ASSAY OF CALCIUM: CPT | Performed by: EMERGENCY MEDICINE

## 2021-01-01 PROCEDURE — 93005 ELECTROCARDIOGRAM TRACING: CPT | Performed by: EMERGENCY MEDICINE

## 2021-01-01 PROCEDURE — G0180 MD CERTIFICATION HHA PATIENT: HCPCS | Performed by: PHYSICIAN ASSISTANT

## 2021-01-01 PROCEDURE — 258N000001 HC RX 258: Performed by: SURGERY

## 2021-01-01 PROCEDURE — 03UL0KZ SUPPLEMENT LEFT INTERNAL CAROTID ARTERY WITH NONAUTOLOGOUS TISSUE SUBSTITUTE, OPEN APPROACH: ICD-10-PCS | Performed by: SURGERY

## 2021-01-01 PROCEDURE — 80048 BASIC METABOLIC PNL TOTAL CA: CPT | Performed by: INTERNAL MEDICINE

## 2021-01-01 PROCEDURE — 99291 CRITICAL CARE FIRST HOUR: CPT | Mod: 25 | Performed by: EMERGENCY MEDICINE

## 2021-01-01 PROCEDURE — 99223 1ST HOSP IP/OBS HIGH 75: CPT | Performed by: PSYCHIATRY & NEUROLOGY

## 2021-01-01 PROCEDURE — 83036 HEMOGLOBIN GLYCOSYLATED A1C: CPT | Performed by: FAMILY MEDICINE

## 2021-01-01 PROCEDURE — 250N000013 HC RX MED GY IP 250 OP 250 PS 637: Performed by: INTERNAL MEDICINE

## 2021-01-01 PROCEDURE — 99495 TRANSJ CARE MGMT MOD F2F 14D: CPT | Performed by: PHYSICIAN ASSISTANT

## 2021-01-01 PROCEDURE — 250N000011 HC RX IP 250 OP 636: Performed by: NURSE ANESTHETIST, CERTIFIED REGISTERED

## 2021-01-01 PROCEDURE — 87635 SARS-COV-2 COVID-19 AMP PRB: CPT | Performed by: EMERGENCY MEDICINE

## 2021-01-01 PROCEDURE — 82374 ASSAY BLOOD CARBON DIOXIDE: CPT | Performed by: EMERGENCY MEDICINE

## 2021-01-01 PROCEDURE — 36415 COLL VENOUS BLD VENIPUNCTURE: CPT | Performed by: INTERNAL MEDICINE

## 2021-01-01 PROCEDURE — 36415 COLL VENOUS BLD VENIPUNCTURE: CPT | Performed by: EMERGENCY MEDICINE

## 2021-01-01 PROCEDURE — G0108 DIAB MANAGE TRN  PER INDIV: HCPCS | Performed by: DIETITIAN, REGISTERED

## 2021-01-01 PROCEDURE — 99239 HOSP IP/OBS DSCHRG MGMT >30: CPT | Performed by: INTERNAL MEDICINE

## 2021-01-01 PROCEDURE — 258N000003 HC RX IP 258 OP 636: Performed by: INTERNAL MEDICINE

## 2021-01-01 PROCEDURE — 93306 TTE W/DOPPLER COMPLETE: CPT | Mod: 26 | Performed by: INTERNAL MEDICINE

## 2021-01-01 PROCEDURE — 85014 HEMATOCRIT: CPT | Performed by: INTERNAL MEDICINE

## 2021-01-01 PROCEDURE — 85049 AUTOMATED PLATELET COUNT: CPT | Performed by: INTERNAL MEDICINE

## 2021-01-01 PROCEDURE — 250N000013 HC RX MED GY IP 250 OP 250 PS 637: Mod: GY

## 2021-01-01 PROCEDURE — 999N001017 HC STATISTIC GLUCOSE BY METER IP

## 2021-01-01 PROCEDURE — 250N000013 HC RX MED GY IP 250 OP 250 PS 637: Performed by: EMERGENCY MEDICINE

## 2021-01-01 PROCEDURE — 250N000009 HC RX 250: Performed by: NURSE ANESTHETIST, CERTIFIED REGISTERED

## 2021-01-01 PROCEDURE — 93882 EXTRACRANIAL UNI/LTD STUDY: CPT | Mod: 26 | Performed by: INTERNAL MEDICINE

## 2021-01-01 PROCEDURE — 80048 BASIC METABOLIC PNL TOTAL CA: CPT | Performed by: EMERGENCY MEDICINE

## 2021-01-01 PROCEDURE — 83036 HEMOGLOBIN GLYCOSYLATED A1C: CPT | Performed by: INTERNAL MEDICINE

## 2021-01-01 PROCEDURE — 99220 PR INITIAL OBSERVATION CARE,LEVEL III: CPT | Performed by: FAMILY MEDICINE

## 2021-01-01 PROCEDURE — 258N000003 HC RX IP 258 OP 636: Performed by: ANESTHESIOLOGY

## 2021-01-01 PROCEDURE — 70450 CT HEAD/BRAIN W/O DYE: CPT

## 2021-01-01 PROCEDURE — 80177 DRUG SCRN QUAN LEVETIRACETAM: CPT | Performed by: PHYSICIAN ASSISTANT

## 2021-01-01 PROCEDURE — 70551 MRI BRAIN STEM W/O DYE: CPT

## 2021-01-01 PROCEDURE — 85025 COMPLETE CBC W/AUTO DIFF WBC: CPT | Performed by: EMERGENCY MEDICINE

## 2021-01-01 PROCEDURE — 85025 COMPLETE CBC W/AUTO DIFF WBC: CPT | Performed by: FAMILY MEDICINE

## 2021-01-01 PROCEDURE — C1763 CONN TISS, NON-HUMAN: HCPCS | Performed by: SURGERY

## 2021-01-01 PROCEDURE — 97161 PT EVAL LOW COMPLEX 20 MIN: CPT | Mod: GP

## 2021-01-01 PROCEDURE — 250N000009 HC RX 250: Performed by: SURGERY

## 2021-01-01 PROCEDURE — 250N000012 HC RX MED GY IP 250 OP 636 PS 637

## 2021-01-01 PROCEDURE — 99223 1ST HOSP IP/OBS HIGH 75: CPT | Mod: AI | Performed by: INTERNAL MEDICINE

## 2021-01-01 PROCEDURE — 250N000011 HC RX IP 250 OP 636: Performed by: SURGERY

## 2021-01-01 PROCEDURE — 250N000011 HC RX IP 250 OP 636: Performed by: ANESTHESIOLOGY

## 2021-01-01 PROCEDURE — 96376 TX/PRO/DX INJ SAME DRUG ADON: CPT

## 2021-01-01 PROCEDURE — 999N000141 HC STATISTIC PRE-PROCEDURE NURSING ASSESSMENT: Performed by: SURGERY

## 2021-01-01 PROCEDURE — 93010 ELECTROCARDIOGRAM REPORT: CPT | Performed by: EMERGENCY MEDICINE

## 2021-01-01 PROCEDURE — 93882 EXTRACRANIAL UNI/LTD STUDY: CPT | Mod: LT

## 2021-01-01 PROCEDURE — C9803 HOPD COVID-19 SPEC COLLECT: HCPCS

## 2021-01-01 PROCEDURE — 99285 EMERGENCY DEPT VISIT HI MDM: CPT | Mod: 25

## 2021-01-01 PROCEDURE — 85379 FIBRIN DEGRADATION QUANT: CPT | Performed by: FAMILY MEDICINE

## 2021-01-01 PROCEDURE — 99207 PR NO BILLABLE SERVICE THIS VISIT: CPT | Performed by: STUDENT IN AN ORGANIZED HEALTH CARE EDUCATION/TRAINING PROGRAM

## 2021-01-01 PROCEDURE — 96372 THER/PROPH/DIAG INJ SC/IM: CPT | Performed by: FAMILY MEDICINE

## 2021-01-01 PROCEDURE — 255N000002 HC RX 255 OP 636: Performed by: INTERNAL MEDICINE

## 2021-01-01 PROCEDURE — 99214 OFFICE O/P EST MOD 30 MIN: CPT | Performed by: PHYSICIAN ASSISTANT

## 2021-01-01 PROCEDURE — 80177 DRUG SCRN QUAN LEVETIRACETAM: CPT | Performed by: FAMILY MEDICINE

## 2021-01-01 PROCEDURE — 92523 SPEECH SOUND LANG COMPREHEN: CPT | Mod: GN

## 2021-01-01 PROCEDURE — 84484 ASSAY OF TROPONIN QUANT: CPT | Performed by: INTERNAL MEDICINE

## 2021-01-01 PROCEDURE — 96375 TX/PRO/DX INJ NEW DRUG ADDON: CPT

## 2021-01-01 PROCEDURE — 370N000017 HC ANESTHESIA TECHNICAL FEE, PER MIN: Performed by: SURGERY

## 2021-01-01 PROCEDURE — 200N000001 HC R&B ICU

## 2021-01-01 PROCEDURE — 86850 RBC ANTIBODY SCREEN: CPT | Performed by: SURGERY

## 2021-01-01 PROCEDURE — 70498 CT ANGIOGRAPHY NECK: CPT

## 2021-01-01 PROCEDURE — 80061 LIPID PANEL: CPT | Performed by: INTERNAL MEDICINE

## 2021-01-01 PROCEDURE — 96372 THER/PROPH/DIAG INJ SC/IM: CPT

## 2021-01-01 PROCEDURE — 999N000157 HC STATISTIC RCP TIME EA 10 MIN

## 2021-01-01 PROCEDURE — 99233 SBSQ HOSP IP/OBS HIGH 50: CPT | Performed by: INTERNAL MEDICINE

## 2021-01-01 PROCEDURE — 258N000003 HC RX IP 258 OP 636: Performed by: NURSE ANESTHETIST, CERTIFIED REGISTERED

## 2021-01-01 PROCEDURE — 83735 ASSAY OF MAGNESIUM: CPT | Performed by: NURSE ANESTHETIST, CERTIFIED REGISTERED

## 2021-01-01 PROCEDURE — 999N000063 US INTRAOPERATIVE

## 2021-01-01 PROCEDURE — 99024 POSTOP FOLLOW-UP VISIT: CPT | Performed by: SURGERY

## 2021-01-01 PROCEDURE — 85049 AUTOMATED PLATELET COUNT: CPT | Performed by: SURGERY

## 2021-01-01 PROCEDURE — 82140 ASSAY OF AMMONIA: CPT | Performed by: FAMILY MEDICINE

## 2021-01-01 PROCEDURE — 272N000001 HC OR GENERAL SUPPLY STERILE: Performed by: SURGERY

## 2021-01-01 PROCEDURE — 250N000012 HC RX MED GY IP 250 OP 636 PS 637: Performed by: INTERNAL MEDICINE

## 2021-01-01 PROCEDURE — 80048 BASIC METABOLIC PNL TOTAL CA: CPT | Performed by: NURSE ANESTHETIST, CERTIFIED REGISTERED

## 2021-01-01 PROCEDURE — 93005 ELECTROCARDIOGRAM TRACING: CPT | Performed by: INTERNAL MEDICINE

## 2021-01-01 PROCEDURE — 92507 TX SP LANG VOICE COMM INDIV: CPT | Mod: GN

## 2021-01-01 PROCEDURE — C1760 CLOSURE DEV, VASC: HCPCS | Performed by: SURGERY

## 2021-01-01 PROCEDURE — 258N000003 HC RX IP 258 OP 636: Performed by: EMERGENCY MEDICINE

## 2021-01-01 PROCEDURE — 80048 BASIC METABOLIC PNL TOTAL CA: CPT | Performed by: PHYSICIAN ASSISTANT

## 2021-01-01 PROCEDURE — 85730 THROMBOPLASTIN TIME PARTIAL: CPT | Performed by: EMERGENCY MEDICINE

## 2021-01-01 PROCEDURE — 03CJ0ZZ EXTIRPATION OF MATTER FROM LEFT COMMON CAROTID ARTERY, OPEN APPROACH: ICD-10-PCS | Performed by: SURGERY

## 2021-01-01 PROCEDURE — 88311 DECALCIFY TISSUE: CPT | Mod: TC | Performed by: SURGERY

## 2021-01-01 PROCEDURE — 258N000003 HC RX IP 258 OP 636: Performed by: FAMILY MEDICINE

## 2021-01-01 PROCEDURE — 84484 ASSAY OF TROPONIN QUANT: CPT | Performed by: FAMILY MEDICINE

## 2021-01-01 PROCEDURE — 84484 ASSAY OF TROPONIN QUANT: CPT | Performed by: EMERGENCY MEDICINE

## 2021-01-01 PROCEDURE — 83605 ASSAY OF LACTIC ACID: CPT | Performed by: FAMILY MEDICINE

## 2021-01-01 PROCEDURE — 99239 HOSP IP/OBS DSCHRG MGMT >30: CPT | Performed by: FAMILY MEDICINE

## 2021-01-01 PROCEDURE — 84443 ASSAY THYROID STIM HORMONE: CPT | Performed by: FAMILY MEDICINE

## 2021-01-01 PROCEDURE — 82803 BLOOD GASES ANY COMBINATION: CPT | Performed by: FAMILY MEDICINE

## 2021-01-01 PROCEDURE — 360N000077 HC SURGERY LEVEL 4, PER MIN: Performed by: SURGERY

## 2021-01-01 PROCEDURE — 98968 PH1 ASSMT&MGMT NQHP 21-30: CPT | Mod: 95 | Performed by: DIETITIAN, REGISTERED

## 2021-01-01 PROCEDURE — 35301 RECHANNELING OF ARTERY: CPT | Mod: LT | Performed by: SURGERY

## 2021-01-01 PROCEDURE — 250N000009 HC RX 250: Performed by: ANESTHESIOLOGY

## 2021-01-01 PROCEDURE — C9803 HOPD COVID-19 SPEC COLLECT: HCPCS | Performed by: EMERGENCY MEDICINE

## 2021-01-01 PROCEDURE — 36415 COLL VENOUS BLD VENIPUNCTURE: CPT | Performed by: SURGERY

## 2021-01-01 PROCEDURE — 93880 EXTRACRANIAL BILAT STUDY: CPT

## 2021-01-01 PROCEDURE — 250N000013 HC RX MED GY IP 250 OP 250 PS 637: Mod: GY | Performed by: FAMILY MEDICINE

## 2021-01-01 PROCEDURE — 272N000004 HC RX 272: Performed by: SURGERY

## 2021-01-01 PROCEDURE — 82565 ASSAY OF CREATININE: CPT

## 2021-01-01 PROCEDURE — 95819 EEG AWAKE AND ASLEEP: CPT

## 2021-01-01 PROCEDURE — 83036 HEMOGLOBIN GLYCOSYLATED A1C: CPT | Performed by: PHYSICIAN ASSISTANT

## 2021-01-01 PROCEDURE — 710N000010 HC RECOVERY PHASE 1, LEVEL 2, PER MIN: Performed by: SURGERY

## 2021-01-01 PROCEDURE — 80177 DRUG SCRN QUAN LEVETIRACETAM: CPT | Performed by: INTERNAL MEDICINE

## 2021-01-01 PROCEDURE — 92610 EVALUATE SWALLOWING FUNCTION: CPT | Mod: GN

## 2021-01-01 DEVICE — IMP PATCH PERICARDIUM PHOTOFIX BOVINE 0.8X8CM PFP0.8X8: Type: IMPLANTABLE DEVICE | Site: NECK | Status: FUNCTIONAL

## 2021-01-01 RX ORDER — LANCETS
EACH MISCELLANEOUS
Qty: 100 EACH | Refills: 4 | Status: SHIPPED | OUTPATIENT
Start: 2021-01-01 | End: 2022-01-01

## 2021-01-01 RX ORDER — ACETAMINOPHEN 325 MG/1
975 TABLET ORAL EVERY 8 HOURS
Status: DISCONTINUED | OUTPATIENT
Start: 2021-01-01 | End: 2021-01-01 | Stop reason: HOSPADM

## 2021-01-01 RX ORDER — ONDANSETRON 2 MG/ML
4 INJECTION INTRAMUSCULAR; INTRAVENOUS EVERY 6 HOURS PRN
Status: DISCONTINUED | OUTPATIENT
Start: 2021-01-01 | End: 2021-01-01 | Stop reason: HOSPADM

## 2021-01-01 RX ORDER — ONDANSETRON 4 MG/1
4 TABLET, ORALLY DISINTEGRATING ORAL EVERY 6 HOURS PRN
Status: DISCONTINUED | OUTPATIENT
Start: 2021-01-01 | End: 2021-01-01 | Stop reason: HOSPADM

## 2021-01-01 RX ORDER — HYDROCHLOROTHIAZIDE 25 MG/1
25 TABLET ORAL DAILY
Status: DISCONTINUED | OUTPATIENT
Start: 2021-01-01 | End: 2021-01-01 | Stop reason: HOSPADM

## 2021-01-01 RX ORDER — FENTANYL CITRATE 50 UG/ML
INJECTION, SOLUTION INTRAMUSCULAR; INTRAVENOUS PRN
Status: DISCONTINUED | OUTPATIENT
Start: 2021-01-01 | End: 2021-01-01

## 2021-01-01 RX ORDER — EPINEPHRINE HCL IN 0.9 % NACL 100 MCG/10
SYRINGE (ML) INTRAVENOUS
Status: DISCONTINUED
Start: 2021-01-01 | End: 2021-01-01 | Stop reason: HOSPADM

## 2021-01-01 RX ORDER — NALOXONE HYDROCHLORIDE 0.4 MG/ML
0.2 INJECTION, SOLUTION INTRAMUSCULAR; INTRAVENOUS; SUBCUTANEOUS
Status: DISCONTINUED | OUTPATIENT
Start: 2021-01-01 | End: 2021-01-01 | Stop reason: HOSPADM

## 2021-01-01 RX ORDER — PROTAMINE SULFATE 10 MG/ML
INJECTION, SOLUTION INTRAVENOUS PRN
Status: DISCONTINUED | OUTPATIENT
Start: 2021-01-01 | End: 2021-01-01

## 2021-01-01 RX ORDER — NITROGLYCERIN 0.4 MG/1
0.4 TABLET SUBLINGUAL EVERY 5 MIN PRN
Status: DISCONTINUED | OUTPATIENT
Start: 2021-01-01 | End: 2021-01-01 | Stop reason: HOSPADM

## 2021-01-01 RX ORDER — GLIPIZIDE 10 MG/1
10 TABLET, FILM COATED, EXTENDED RELEASE ORAL
Status: DISCONTINUED | OUTPATIENT
Start: 2021-01-01 | End: 2021-01-01 | Stop reason: HOSPADM

## 2021-01-01 RX ORDER — HYDROCHLOROTHIAZIDE 12.5 MG/1
12.5 CAPSULE ORAL ONCE
Status: DISCONTINUED | OUTPATIENT
Start: 2021-01-01 | End: 2021-01-01 | Stop reason: HOSPADM

## 2021-01-01 RX ORDER — ONDANSETRON 4 MG/1
4 TABLET, ORALLY DISINTEGRATING ORAL EVERY 6 HOURS PRN
Status: DISCONTINUED | OUTPATIENT
Start: 2021-01-01 | End: 2021-01-01

## 2021-01-01 RX ORDER — LOSARTAN POTASSIUM 50 MG/1
50 TABLET ORAL DAILY
Qty: 90 TABLET | Refills: 3 | Status: SHIPPED | OUTPATIENT
Start: 2021-01-01

## 2021-01-01 RX ORDER — HYDROCHLOROTHIAZIDE 12.5 MG/1
12.5 CAPSULE ORAL DAILY
Status: DISCONTINUED | OUTPATIENT
Start: 2021-01-01 | End: 2021-01-01 | Stop reason: HOSPADM

## 2021-01-01 RX ORDER — NICOTINE POLACRILEX 4 MG
15-30 LOZENGE BUCCAL
Status: DISCONTINUED | OUTPATIENT
Start: 2021-01-01 | End: 2021-01-01 | Stop reason: HOSPADM

## 2021-01-01 RX ORDER — LORAZEPAM 2 MG/ML
1 INJECTION INTRAMUSCULAR ONCE
Status: COMPLETED | OUTPATIENT
Start: 2021-01-01 | End: 2021-01-01

## 2021-01-01 RX ORDER — FLASH GLUCOSE SENSOR
1 KIT MISCELLANEOUS
Qty: 2 EACH | Refills: 5 | Status: SHIPPED | OUTPATIENT
Start: 2021-01-01 | End: 2021-01-01

## 2021-01-01 RX ORDER — FENTANYL CITRATE 50 UG/ML
50 INJECTION, SOLUTION INTRAMUSCULAR; INTRAVENOUS EVERY 5 MIN PRN
Status: DISCONTINUED | OUTPATIENT
Start: 2021-01-01 | End: 2021-01-01

## 2021-01-01 RX ORDER — LIDOCAINE HYDROCHLORIDE 20 MG/ML
INJECTION, SOLUTION INFILTRATION; PERINEURAL PRN
Status: DISCONTINUED | OUTPATIENT
Start: 2021-01-01 | End: 2021-01-01

## 2021-01-01 RX ORDER — NITROGLYCERIN 10 MG/100ML
INJECTION INTRAVENOUS PRN
Status: DISCONTINUED | OUTPATIENT
Start: 2021-01-01 | End: 2021-01-01

## 2021-01-01 RX ORDER — LOSARTAN POTASSIUM 50 MG/1
50 TABLET ORAL DAILY
Status: DISCONTINUED | OUTPATIENT
Start: 2021-01-01 | End: 2021-01-01

## 2021-01-01 RX ORDER — IOPAMIDOL 755 MG/ML
70 INJECTION, SOLUTION INTRAVASCULAR ONCE
Status: COMPLETED | OUTPATIENT
Start: 2021-01-01 | End: 2021-01-01

## 2021-01-01 RX ORDER — CEFAZOLIN SODIUM 2 G/100ML
2 INJECTION, SOLUTION INTRAVENOUS SEE ADMIN INSTRUCTIONS
Status: DISCONTINUED | OUTPATIENT
Start: 2021-01-01 | End: 2021-01-01 | Stop reason: HOSPADM

## 2021-01-01 RX ORDER — SODIUM CHLORIDE, SODIUM LACTATE, POTASSIUM CHLORIDE, CALCIUM CHLORIDE 600; 310; 30; 20 MG/100ML; MG/100ML; MG/100ML; MG/100ML
INJECTION, SOLUTION INTRAVENOUS CONTINUOUS
Status: DISCONTINUED | OUTPATIENT
Start: 2021-01-01 | End: 2021-01-01

## 2021-01-01 RX ORDER — HYDROCHLOROTHIAZIDE 12.5 MG/1
12.5 CAPSULE ORAL DAILY
Status: DISCONTINUED | OUTPATIENT
Start: 2021-01-01 | End: 2021-01-01

## 2021-01-01 RX ORDER — PIOGLITAZONEHYDROCHLORIDE 15 MG/1
15 TABLET ORAL DAILY
Status: DISCONTINUED | OUTPATIENT
Start: 2021-01-01 | End: 2021-01-01 | Stop reason: HOSPADM

## 2021-01-01 RX ORDER — HYDRALAZINE HYDROCHLORIDE 20 MG/ML
10 INJECTION INTRAMUSCULAR; INTRAVENOUS EVERY 6 HOURS PRN
Status: DISCONTINUED | OUTPATIENT
Start: 2021-01-01 | End: 2021-01-01 | Stop reason: HOSPADM

## 2021-01-01 RX ORDER — FLASH GLUCOSE SENSOR
1 KIT MISCELLANEOUS
Qty: 2 EACH | Refills: 5 | Status: SHIPPED | OUTPATIENT
Start: 2021-01-01 | End: 2022-01-01

## 2021-01-01 RX ORDER — ASPIRIN 81 MG/1
81 TABLET ORAL DAILY
Status: DISCONTINUED | OUTPATIENT
Start: 2021-01-01 | End: 2021-01-01 | Stop reason: HOSPADM

## 2021-01-01 RX ORDER — DEXTROSE MONOHYDRATE 25 G/50ML
25-50 INJECTION, SOLUTION INTRAVENOUS
Status: DISCONTINUED | OUTPATIENT
Start: 2021-01-01 | End: 2021-01-01 | Stop reason: HOSPADM

## 2021-01-01 RX ORDER — DEXTROSE MONOHYDRATE 25 G/50ML
25-50 INJECTION, SOLUTION INTRAVENOUS
Status: DISCONTINUED | OUTPATIENT
Start: 2021-01-01 | End: 2021-01-01

## 2021-01-01 RX ORDER — OXYCODONE HYDROCHLORIDE 5 MG/1
5 TABLET ORAL EVERY 4 HOURS PRN
Status: DISCONTINUED | OUTPATIENT
Start: 2021-01-01 | End: 2021-01-01 | Stop reason: HOSPADM

## 2021-01-01 RX ORDER — HYDROMORPHONE HCL IN WATER/PF 6 MG/30 ML
0.4 PATIENT CONTROLLED ANALGESIA SYRINGE INTRAVENOUS EVERY 5 MIN PRN
Status: DISCONTINUED | OUTPATIENT
Start: 2021-01-01 | End: 2021-01-01

## 2021-01-01 RX ORDER — ACETAMINOPHEN 325 MG/1
650 TABLET ORAL EVERY 4 HOURS PRN
Status: DISCONTINUED | OUTPATIENT
Start: 2021-01-01 | End: 2021-01-01 | Stop reason: HOSPADM

## 2021-01-01 RX ORDER — SERTRALINE HYDROCHLORIDE 25 MG/1
50 TABLET, FILM COATED ORAL DAILY
COMMUNITY
Start: 2020-07-06 | End: 2021-01-01

## 2021-01-01 RX ORDER — SODIUM CHLORIDE, SODIUM LACTATE, POTASSIUM CHLORIDE, CALCIUM CHLORIDE 600; 310; 30; 20 MG/100ML; MG/100ML; MG/100ML; MG/100ML
INJECTION, SOLUTION INTRAVENOUS CONTINUOUS
Status: DISCONTINUED | OUTPATIENT
Start: 2021-01-01 | End: 2021-01-01 | Stop reason: HOSPADM

## 2021-01-01 RX ORDER — SODIUM CHLORIDE 9 MG/ML
INJECTION, SOLUTION INTRAVENOUS CONTINUOUS PRN
Status: DISCONTINUED | OUTPATIENT
Start: 2021-01-01 | End: 2021-01-01

## 2021-01-01 RX ORDER — HEPARIN SODIUM 1000 [USP'U]/ML
INJECTION, SOLUTION INTRAVENOUS; SUBCUTANEOUS PRN
Status: DISCONTINUED | OUTPATIENT
Start: 2021-01-01 | End: 2021-01-01

## 2021-01-01 RX ORDER — METOPROLOL TARTRATE 25 MG/1
25 TABLET, FILM COATED ORAL EVERY 12 HOURS
Status: DISCONTINUED | OUTPATIENT
Start: 2021-01-01 | End: 2021-01-01 | Stop reason: HOSPADM

## 2021-01-01 RX ORDER — PROCHLORPERAZINE MALEATE 5 MG
5 TABLET ORAL EVERY 6 HOURS PRN
Status: DISCONTINUED | OUTPATIENT
Start: 2021-01-01 | End: 2021-01-01 | Stop reason: HOSPADM

## 2021-01-01 RX ORDER — HYDROMORPHONE HCL IN WATER/PF 6 MG/30 ML
0.2 PATIENT CONTROLLED ANALGESIA SYRINGE INTRAVENOUS
Status: DISCONTINUED | OUTPATIENT
Start: 2021-01-01 | End: 2021-01-01 | Stop reason: HOSPADM

## 2021-01-01 RX ORDER — HEPARIN SODIUM 5000 [USP'U]/.5ML
5000 INJECTION, SOLUTION INTRAVENOUS; SUBCUTANEOUS EVERY 12 HOURS
Status: DISCONTINUED | OUTPATIENT
Start: 2021-01-01 | End: 2021-01-01

## 2021-01-01 RX ORDER — DEXMEDETOMIDINE HYDROCHLORIDE 4 UG/ML
.2-1 INJECTION, SOLUTION INTRAVENOUS CONTINUOUS
Status: DISCONTINUED | OUTPATIENT
Start: 2021-01-01 | End: 2021-01-01 | Stop reason: HOSPADM

## 2021-01-01 RX ORDER — CLOPIDOGREL BISULFATE 75 MG/1
75 TABLET ORAL DAILY
Status: DISCONTINUED | OUTPATIENT
Start: 2021-01-01 | End: 2021-01-01

## 2021-01-01 RX ORDER — ACETAMINOPHEN 325 MG/1
650 TABLET ORAL EVERY 4 HOURS PRN
Status: DISCONTINUED | OUTPATIENT
Start: 2021-01-01 | End: 2021-01-01

## 2021-01-01 RX ORDER — FLASH GLUCOSE SENSOR
1 KIT MISCELLANEOUS EVERY 8 HOURS
Qty: 1 EACH | Refills: 0 | Status: SHIPPED | OUTPATIENT
Start: 2021-01-01 | End: 2021-01-01

## 2021-01-01 RX ORDER — LEVETIRACETAM 500 MG/1
1000 TABLET ORAL 2 TIMES DAILY
Status: DISCONTINUED | OUTPATIENT
Start: 2021-01-01 | End: 2021-01-01 | Stop reason: HOSPADM

## 2021-01-01 RX ORDER — NITROGLYCERIN 5 MG/ML
VIAL (ML) INTRAVENOUS
Status: DISCONTINUED
Start: 2021-01-01 | End: 2021-01-01 | Stop reason: HOSPADM

## 2021-01-01 RX ORDER — BLOOD-GLUCOSE METER
1 EACH MISCELLANEOUS DAILY
Qty: 1 KIT | Refills: 0 | Status: SHIPPED | OUTPATIENT
Start: 2021-01-01 | End: 2022-01-01

## 2021-01-01 RX ORDER — GLIPIZIDE 10 MG/1
10 TABLET, FILM COATED, EXTENDED RELEASE ORAL DAILY
COMMUNITY
Start: 2020-10-30 | End: 2021-01-01

## 2021-01-01 RX ORDER — AMOXICILLIN 250 MG
1 CAPSULE ORAL 2 TIMES DAILY
Status: DISCONTINUED | OUTPATIENT
Start: 2021-01-01 | End: 2021-01-01 | Stop reason: HOSPADM

## 2021-01-01 RX ORDER — LIDOCAINE 40 MG/G
CREAM TOPICAL
Status: DISCONTINUED | OUTPATIENT
Start: 2021-01-01 | End: 2021-01-01 | Stop reason: HOSPADM

## 2021-01-01 RX ORDER — HYDROCHLOROTHIAZIDE 50 MG/1
50 TABLET ORAL DAILY
Qty: 90 TABLET | Refills: 3 | Status: SHIPPED | OUTPATIENT
Start: 2021-01-01 | End: 2022-01-01

## 2021-01-01 RX ORDER — FLASH GLUCOSE SENSOR
1 KIT MISCELLANEOUS EVERY 8 HOURS
Qty: 1 EACH | Refills: 0 | Status: SHIPPED | OUTPATIENT
Start: 2021-01-01 | End: 2022-01-01

## 2021-01-01 RX ORDER — LEVETIRACETAM 750 MG/1
1500 TABLET ORAL 2 TIMES DAILY
COMMUNITY

## 2021-01-01 RX ORDER — OLANZAPINE 10 MG/2ML
5 INJECTION, POWDER, FOR SOLUTION INTRAMUSCULAR ONCE
Status: COMPLETED | OUTPATIENT
Start: 2021-01-01 | End: 2021-01-01

## 2021-01-01 RX ORDER — LOSARTAN POTASSIUM 25 MG/1
50 TABLET ORAL DAILY
Status: DISCONTINUED | OUTPATIENT
Start: 2021-01-01 | End: 2021-01-01 | Stop reason: HOSPADM

## 2021-01-01 RX ORDER — ONDANSETRON 2 MG/ML
4 INJECTION INTRAMUSCULAR; INTRAVENOUS EVERY 30 MIN PRN
Status: DISCONTINUED | OUTPATIENT
Start: 2021-01-01 | End: 2021-01-01

## 2021-01-01 RX ORDER — LABETALOL HYDROCHLORIDE 5 MG/ML
10 INJECTION, SOLUTION INTRAVENOUS EVERY 10 MIN PRN
Status: DISCONTINUED | OUTPATIENT
Start: 2021-01-01 | End: 2021-01-01 | Stop reason: HOSPADM

## 2021-01-01 RX ORDER — ONDANSETRON 2 MG/ML
4 INJECTION INTRAMUSCULAR; INTRAVENOUS EVERY 6 HOURS PRN
Status: DISCONTINUED | OUTPATIENT
Start: 2021-01-01 | End: 2021-01-01

## 2021-01-01 RX ORDER — ATORVASTATIN CALCIUM 40 MG/1
80 TABLET, FILM COATED ORAL EVERY EVENING
Status: DISCONTINUED | OUTPATIENT
Start: 2021-01-01 | End: 2021-01-01 | Stop reason: HOSPADM

## 2021-01-01 RX ORDER — HYDROMORPHONE HCL IN WATER/PF 6 MG/30 ML
0.4 PATIENT CONTROLLED ANALGESIA SYRINGE INTRAVENOUS
Status: DISCONTINUED | OUTPATIENT
Start: 2021-01-01 | End: 2021-01-01 | Stop reason: HOSPADM

## 2021-01-01 RX ORDER — BLOOD SUGAR DIAGNOSTIC
STRIP MISCELLANEOUS
Qty: 50 STRIP | Refills: 11 | Status: SHIPPED | OUTPATIENT
Start: 2021-01-01 | End: 2022-01-01

## 2021-01-01 RX ORDER — SODIUM CHLORIDE 9 MG/ML
INJECTION, SOLUTION INTRAVENOUS CONTINUOUS
Status: DISCONTINUED | OUTPATIENT
Start: 2021-01-01 | End: 2021-01-01

## 2021-01-01 RX ORDER — BISACODYL 10 MG
10 SUPPOSITORY, RECTAL RECTAL DAILY PRN
Status: DISCONTINUED | OUTPATIENT
Start: 2021-01-01 | End: 2021-01-01 | Stop reason: HOSPADM

## 2021-01-01 RX ORDER — OXYCODONE HYDROCHLORIDE 5 MG/1
10 TABLET ORAL EVERY 4 HOURS PRN
Status: DISCONTINUED | OUTPATIENT
Start: 2021-01-01 | End: 2021-01-01 | Stop reason: HOSPADM

## 2021-01-01 RX ORDER — PIOGLITAZONEHYDROCHLORIDE 30 MG/1
TABLET ORAL
Qty: 30 TABLET | Refills: 1 | Status: SHIPPED | OUTPATIENT
Start: 2021-01-01 | End: 2022-01-01

## 2021-01-01 RX ORDER — ACETAMINOPHEN 650 MG/1
650 SUPPOSITORY RECTAL EVERY 4 HOURS PRN
Status: DISCONTINUED | OUTPATIENT
Start: 2021-01-01 | End: 2021-01-01 | Stop reason: HOSPADM

## 2021-01-01 RX ORDER — LOSARTAN POTASSIUM AND HYDROCHLOROTHIAZIDE 12.5; 5 MG/1; MG/1
1 TABLET ORAL DAILY
COMMUNITY
Start: 2020-11-25 | End: 2021-01-01 | Stop reason: DRUGHIGH

## 2021-01-01 RX ORDER — ATORVASTATIN CALCIUM 80 MG/1
80 TABLET, FILM COATED ORAL DAILY
Qty: 90 TABLET | Refills: 3 | Status: SHIPPED | OUTPATIENT
Start: 2021-01-01

## 2021-01-01 RX ORDER — MULTIPLE VITAMINS W/ MINERALS TAB 9MG-400MCG
1 TAB ORAL DAILY
COMMUNITY
End: 2022-01-01

## 2021-01-01 RX ORDER — PIOGLITAZONEHYDROCHLORIDE 15 MG/1
15 TABLET ORAL DAILY
COMMUNITY
Start: 2021-01-01 | End: 2021-01-01

## 2021-01-01 RX ORDER — ACETAMINOPHEN 325 MG/1
650 TABLET ORAL EVERY 4 HOURS PRN
Status: ON HOLD | COMMUNITY
Start: 2021-01-01 | End: 2022-01-01

## 2021-01-01 RX ORDER — HEPARIN SODIUM 5000 [USP'U]/.5ML
5000 INJECTION, SOLUTION INTRAVENOUS; SUBCUTANEOUS EVERY 8 HOURS
Status: DISCONTINUED | OUTPATIENT
Start: 2021-01-01 | End: 2021-01-01 | Stop reason: HOSPADM

## 2021-01-01 RX ORDER — NICOTINE POLACRILEX 4 MG
15-30 LOZENGE BUCCAL
Status: DISCONTINUED | OUTPATIENT
Start: 2021-01-01 | End: 2021-01-01

## 2021-01-01 RX ORDER — IOPAMIDOL 755 MG/ML
75 INJECTION, SOLUTION INTRAVASCULAR ONCE
Status: COMPLETED | OUTPATIENT
Start: 2021-01-01 | End: 2021-01-01

## 2021-01-01 RX ORDER — MAGNESIUM SULFATE HEPTAHYDRATE 40 MG/ML
2 INJECTION, SOLUTION INTRAVENOUS ONCE
Status: COMPLETED | OUTPATIENT
Start: 2021-01-01 | End: 2021-01-01

## 2021-01-01 RX ORDER — LIDOCAINE HYDROCHLORIDE 10 MG/ML
INJECTION, SOLUTION INFILTRATION; PERINEURAL
Status: COMPLETED | OUTPATIENT
Start: 2021-01-01 | End: 2021-01-01

## 2021-01-01 RX ORDER — PIOGLITAZONEHYDROCHLORIDE 15 MG/1
15 TABLET ORAL DAILY
Qty: 90 TABLET | Refills: 3 | Status: SHIPPED | OUTPATIENT
Start: 2021-01-01 | End: 2021-01-01

## 2021-01-01 RX ORDER — FLASH GLUCOSE SCANNING READER
1 EACH MISCELLANEOUS ONCE
Qty: 1 EACH | Refills: 0 | Status: SHIPPED | OUTPATIENT
Start: 2021-01-01 | End: 2021-01-01

## 2021-01-01 RX ORDER — NALOXONE HYDROCHLORIDE 0.4 MG/ML
0.4 INJECTION, SOLUTION INTRAMUSCULAR; INTRAVENOUS; SUBCUTANEOUS
Status: DISCONTINUED | OUTPATIENT
Start: 2021-01-01 | End: 2021-01-01 | Stop reason: HOSPADM

## 2021-01-01 RX ORDER — PROPOFOL 10 MG/ML
INJECTION, EMULSION INTRAVENOUS PRN
Status: DISCONTINUED | OUTPATIENT
Start: 2021-01-01 | End: 2021-01-01

## 2021-01-01 RX ORDER — GLIPIZIDE 5 MG/1
5 TABLET ORAL
Status: ON HOLD | COMMUNITY
End: 2021-01-01

## 2021-01-01 RX ORDER — DIPHENHYDRAMINE HYDROCHLORIDE 50 MG/ML
50 INJECTION INTRAMUSCULAR; INTRAVENOUS ONCE
Status: COMPLETED | OUTPATIENT
Start: 2021-01-01 | End: 2021-01-01

## 2021-01-01 RX ORDER — CLOPIDOGREL BISULFATE 75 MG/1
75 TABLET ORAL DAILY
Status: DISCONTINUED | OUTPATIENT
Start: 2021-01-01 | End: 2021-01-01 | Stop reason: HOSPADM

## 2021-01-01 RX ORDER — CLOPIDOGREL BISULFATE 75 MG/1
75 TABLET ORAL DAILY
Qty: 30 TABLET | Refills: 0 | Status: SHIPPED | OUTPATIENT
Start: 2021-01-01 | End: 2022-01-01

## 2021-01-01 RX ORDER — CALCIUM CITRATE/VITAMIN D3 315MG-6.25
TABLET ORAL SEE ADMIN INSTRUCTIONS
COMMUNITY
End: 2022-01-01

## 2021-01-01 RX ORDER — CEFAZOLIN SODIUM 2 G/100ML
2 INJECTION, SOLUTION INTRAVENOUS
Status: COMPLETED | OUTPATIENT
Start: 2021-01-01 | End: 2021-01-01

## 2021-01-01 RX ORDER — POLYETHYLENE GLYCOL 3350 17 G/17G
17 POWDER, FOR SOLUTION ORAL DAILY
Status: DISCONTINUED | OUTPATIENT
Start: 2021-01-01 | End: 2021-01-01 | Stop reason: HOSPADM

## 2021-01-01 RX ORDER — ONDANSETRON 2 MG/ML
INJECTION INTRAMUSCULAR; INTRAVENOUS PRN
Status: DISCONTINUED | OUTPATIENT
Start: 2021-01-01 | End: 2021-01-01

## 2021-01-01 RX ORDER — CLOPIDOGREL BISULFATE 75 MG/1
225 TABLET ORAL ONCE
Status: COMPLETED | OUTPATIENT
Start: 2021-01-01 | End: 2021-01-01

## 2021-01-01 RX ORDER — DEXMEDETOMIDINE HYDROCHLORIDE 4 UG/ML
INJECTION, SOLUTION INTRAVENOUS
Status: DISCONTINUED
Start: 2021-01-01 | End: 2021-01-01 | Stop reason: HOSPADM

## 2021-01-01 RX ORDER — ASPIRIN 81 MG/1
81 TABLET, CHEWABLE ORAL DAILY
Status: DISCONTINUED | OUTPATIENT
Start: 2021-01-01 | End: 2021-01-01 | Stop reason: HOSPADM

## 2021-01-01 RX ORDER — ATORVASTATIN CALCIUM 80 MG/1
80 TABLET, FILM COATED ORAL DAILY
COMMUNITY
Start: 2020-07-06 | End: 2021-01-01

## 2021-01-01 RX ORDER — BUPIVACAINE HYDROCHLORIDE 2.5 MG/ML
INJECTION, SOLUTION INFILTRATION; PERINEURAL PRN
Status: DISCONTINUED | OUTPATIENT
Start: 2021-01-01 | End: 2021-01-01 | Stop reason: HOSPADM

## 2021-01-01 RX ORDER — HALOPERIDOL 5 MG/ML
1 INJECTION INTRAMUSCULAR
Status: DISCONTINUED | OUTPATIENT
Start: 2021-01-01 | End: 2021-01-01

## 2021-01-01 RX ORDER — LOSARTAN POTASSIUM 50 MG/1
50 TABLET ORAL DAILY
Status: DISCONTINUED | OUTPATIENT
Start: 2021-01-01 | End: 2021-01-01 | Stop reason: HOSPADM

## 2021-01-01 RX ORDER — ONDANSETRON 4 MG/1
4 TABLET, ORALLY DISINTEGRATING ORAL EVERY 30 MIN PRN
Status: DISCONTINUED | OUTPATIENT
Start: 2021-01-01 | End: 2021-01-01

## 2021-01-01 RX ORDER — ATORVASTATIN CALCIUM 80 MG/1
80 TABLET, FILM COATED ORAL DAILY
Status: DISCONTINUED | OUTPATIENT
Start: 2021-01-01 | End: 2021-01-01 | Stop reason: HOSPADM

## 2021-01-01 RX ORDER — OXYCODONE HYDROCHLORIDE 5 MG/1
5 TABLET ORAL EVERY 4 HOURS PRN
Qty: 5 TABLET | Refills: 0 | Status: SHIPPED | OUTPATIENT
Start: 2021-01-01 | End: 2021-01-01

## 2021-01-01 RX ORDER — GLIPIZIDE 10 MG/1
10 TABLET, FILM COATED, EXTENDED RELEASE ORAL DAILY
Qty: 90 TABLET | Refills: 3 | Status: SHIPPED | OUTPATIENT
Start: 2021-01-01 | End: 2022-01-01

## 2021-01-01 RX ORDER — ASPIRIN 81 MG/1
81 TABLET, CHEWABLE ORAL DAILY
COMMUNITY
End: 2022-01-01

## 2021-01-01 RX ORDER — LOSARTAN POTASSIUM AND HYDROCHLOROTHIAZIDE 12.5; 5 MG/1; MG/1
1 TABLET ORAL DAILY
Status: DISCONTINUED | OUTPATIENT
Start: 2021-01-01 | End: 2021-01-01 | Stop reason: RX

## 2021-01-01 RX ADMIN — FENTANYL CITRATE 100 MCG: 50 INJECTION, SOLUTION INTRAMUSCULAR; INTRAVENOUS at 15:09

## 2021-01-01 RX ADMIN — LORAZEPAM 1 MG: 2 INJECTION INTRAMUSCULAR; INTRAVENOUS at 15:06

## 2021-01-01 RX ADMIN — LEVETIRACETAM 2000 MG: 100 INJECTION, SOLUTION INTRAVENOUS at 20:46

## 2021-01-01 RX ADMIN — INSULIN ASPART 3 UNITS: 100 INJECTION, SOLUTION INTRAVENOUS; SUBCUTANEOUS at 12:14

## 2021-01-01 RX ADMIN — SODIUM CHLORIDE, POTASSIUM CHLORIDE, SODIUM LACTATE AND CALCIUM CHLORIDE: 600; 310; 30; 20 INJECTION, SOLUTION INTRAVENOUS at 03:13

## 2021-01-01 RX ADMIN — NITROGLYCERIN 20 MCG: 10 INJECTION INTRAVENOUS at 16:05

## 2021-01-01 RX ADMIN — LEVETIRACETAM 750 MG: 500 TABLET, FILM COATED ORAL at 08:42

## 2021-01-01 RX ADMIN — PHENYLEPHRINE HYDROCHLORIDE 0.3 MCG/KG/MIN: 10 INJECTION INTRAVENOUS at 16:19

## 2021-01-01 RX ADMIN — CEFAZOLIN SODIUM 2 G: 2 INJECTION, SOLUTION INTRAVENOUS at 15:23

## 2021-01-01 RX ADMIN — PANTOPRAZOLE SODIUM 40 MG: 40 INJECTION, POWDER, FOR SOLUTION INTRAVENOUS at 08:03

## 2021-01-01 RX ADMIN — ACETAMINOPHEN 975 MG: 325 TABLET ORAL at 04:08

## 2021-01-01 RX ADMIN — ASPIRIN 81 MG CHEWABLE TABLET 81 MG: 81 TABLET CHEWABLE at 14:33

## 2021-01-01 RX ADMIN — HEPARIN SODIUM 2000 UNITS: 1000 INJECTION, SOLUTION INTRAVENOUS; SUBCUTANEOUS at 17:07

## 2021-01-01 RX ADMIN — PROPOFOL 150 MG: 10 INJECTION, EMULSION INTRAVENOUS at 15:10

## 2021-01-01 RX ADMIN — HEPARIN SODIUM 5000 UNITS: 5000 INJECTION, SOLUTION INTRAVENOUS; SUBCUTANEOUS at 06:41

## 2021-01-01 RX ADMIN — ACETAMINOPHEN 650 MG: 325 TABLET, FILM COATED ORAL at 00:29

## 2021-01-01 RX ADMIN — METOPROLOL TARTRATE 25 MG: 25 TABLET, FILM COATED ORAL at 20:51

## 2021-01-01 RX ADMIN — ATORVASTATIN CALCIUM 80 MG: 40 TABLET, FILM COATED ORAL at 20:59

## 2021-01-01 RX ADMIN — LOSARTAN POTASSIUM 50 MG: 50 TABLET, FILM COATED ORAL at 16:26

## 2021-01-01 RX ADMIN — DOCUSATE SODIUM 50 MG AND SENNOSIDES 8.6 MG 1 TABLET: 8.6; 5 TABLET, FILM COATED ORAL at 20:51

## 2021-01-01 RX ADMIN — POLYETHYLENE GLYCOL 3350 17 G: 17 POWDER, FOR SOLUTION ORAL at 07:59

## 2021-01-01 RX ADMIN — IOPAMIDOL 70 ML: 755 INJECTION, SOLUTION INTRAVENOUS at 18:36

## 2021-01-01 RX ADMIN — GLIPIZIDE 10 MG: 10 TABLET, EXTENDED RELEASE ORAL at 09:54

## 2021-01-01 RX ADMIN — ROCURONIUM BROMIDE 50 MG: 50 INJECTION, SOLUTION INTRAVENOUS at 15:11

## 2021-01-01 RX ADMIN — ATORVASTATIN CALCIUM 80 MG: 80 TABLET, FILM COATED ORAL at 09:53

## 2021-01-01 RX ADMIN — LIDOCAINE HYDROCHLORIDE 2 ML: 10 INJECTION, SOLUTION INFILTRATION; PERINEURAL at 14:42

## 2021-01-01 RX ADMIN — LEVETIRACETAM 750 MG: 500 TABLET, FILM COATED ORAL at 08:50

## 2021-01-01 RX ADMIN — PHENYLEPHRINE HYDROCHLORIDE 100 MCG: 10 INJECTION INTRAVENOUS at 18:24

## 2021-01-01 RX ADMIN — NICARDIPINE HYDROCHLORIDE 2.5 MG/HR: 0.2 INJECTION, SOLUTION INTRAVENOUS at 16:52

## 2021-01-01 RX ADMIN — ASPIRIN 81 MG CHEWABLE TABLET 81 MG: 81 TABLET CHEWABLE at 08:00

## 2021-01-01 RX ADMIN — CLOPIDOGREL BISULFATE 75 MG: 75 TABLET ORAL at 14:34

## 2021-01-01 RX ADMIN — MIDAZOLAM HYDROCHLORIDE 2 MG: 1 INJECTION, SOLUTION INTRAMUSCULAR; INTRAVENOUS at 14:59

## 2021-01-01 RX ADMIN — PERFLUTREN 3 ML: 6.52 INJECTION, SUSPENSION INTRAVENOUS at 07:52

## 2021-01-01 RX ADMIN — PROTAMINE SULFATE 50 MG: 10 INJECTION, SOLUTION INTRAVENOUS at 17:51

## 2021-01-01 RX ADMIN — CLOPIDOGREL BISULFATE 225 MG: 75 TABLET ORAL at 21:00

## 2021-01-01 RX ADMIN — LORAZEPAM 1 MG: 2 INJECTION INTRAMUSCULAR; INTRAVENOUS at 16:43

## 2021-01-01 RX ADMIN — ATORVASTATIN CALCIUM 80 MG: 40 TABLET, FILM COATED ORAL at 19:55

## 2021-01-01 RX ADMIN — DOCUSATE SODIUM 50 MG AND SENNOSIDES 8.6 MG 1 TABLET: 8.6; 5 TABLET, FILM COATED ORAL at 08:50

## 2021-01-01 RX ADMIN — SUGAMMADEX 180 MG: 100 INJECTION, SOLUTION INTRAVENOUS at 18:55

## 2021-01-01 RX ADMIN — ROCURONIUM BROMIDE 50 MG: 50 INJECTION, SOLUTION INTRAVENOUS at 16:05

## 2021-01-01 RX ADMIN — SODIUM CHLORIDE 100 ML: 9 INJECTION, SOLUTION INTRAVENOUS at 18:36

## 2021-01-01 RX ADMIN — ACETAMINOPHEN 650 MG: 325 TABLET ORAL at 14:50

## 2021-01-01 RX ADMIN — LEVETIRACETAM 750 MG: 500 TABLET, FILM COATED ORAL at 08:00

## 2021-01-01 RX ADMIN — POLYETHYLENE GLYCOL 3350 17 G: 17 POWDER, FOR SOLUTION ORAL at 09:27

## 2021-01-01 RX ADMIN — NITROGLYCERIN 20 MCG: 10 INJECTION INTRAVENOUS at 16:22

## 2021-01-01 RX ADMIN — INSULIN GLARGINE 5 UNITS: 100 INJECTION, SOLUTION SUBCUTANEOUS at 21:48

## 2021-01-01 RX ADMIN — METOPROLOL TARTRATE 25 MG: 25 TABLET, FILM COATED ORAL at 10:17

## 2021-01-01 RX ADMIN — HEPARIN SODIUM 5000 UNITS: 5000 INJECTION, SOLUTION INTRAVENOUS; SUBCUTANEOUS at 22:03

## 2021-01-01 RX ADMIN — NITROGLYCERIN 20 MCG: 10 INJECTION INTRAVENOUS at 16:39

## 2021-01-01 RX ADMIN — HEPARIN SODIUM 5000 UNITS: 10000 INJECTION, SOLUTION INTRAVENOUS; SUBCUTANEOUS at 14:52

## 2021-01-01 RX ADMIN — FENTANYL CITRATE 100 MCG: 50 INJECTION, SOLUTION INTRAMUSCULAR; INTRAVENOUS at 16:05

## 2021-01-01 RX ADMIN — ACETAMINOPHEN 975 MG: 325 TABLET ORAL at 19:54

## 2021-01-01 RX ADMIN — METOPROLOL TARTRATE 25 MG: 25 TABLET, FILM COATED ORAL at 08:06

## 2021-01-01 RX ADMIN — NITROGLYCERIN 20 MCG: 10 INJECTION INTRAVENOUS at 16:03

## 2021-01-01 RX ADMIN — ROCURONIUM BROMIDE 20 MG: 50 INJECTION, SOLUTION INTRAVENOUS at 17:36

## 2021-01-01 RX ADMIN — ACETAMINOPHEN 975 MG: 325 TABLET ORAL at 03:13

## 2021-01-01 RX ADMIN — LEVETIRACETAM 1000 MG: 100 INJECTION, SOLUTION INTRAVENOUS at 16:09

## 2021-01-01 RX ADMIN — LEVETIRACETAM 750 MG: 500 TABLET, FILM COATED ORAL at 22:20

## 2021-01-01 RX ADMIN — SERTRALINE HYDROCHLORIDE 50 MG: 50 TABLET ORAL at 09:54

## 2021-01-01 RX ADMIN — SODIUM CHLORIDE: 9 INJECTION, SOLUTION INTRAVENOUS at 17:37

## 2021-01-01 RX ADMIN — LEVETIRACETAM 750 MG: 250 TABLET, FILM COATED ORAL at 09:54

## 2021-01-01 RX ADMIN — LOSARTAN POTASSIUM 50 MG: 50 TABLET, FILM COATED ORAL at 09:55

## 2021-01-01 RX ADMIN — ACETAMINOPHEN 650 MG: 325 TABLET ORAL at 18:33

## 2021-01-01 RX ADMIN — SODIUM CHLORIDE, POTASSIUM CHLORIDE, SODIUM LACTATE AND CALCIUM CHLORIDE: 600; 310; 30; 20 INJECTION, SOLUTION INTRAVENOUS at 20:49

## 2021-01-01 RX ADMIN — CLOPIDOGREL BISULFATE 75 MG: 75 TABLET ORAL at 10:41

## 2021-01-01 RX ADMIN — PHENYLEPHRINE HYDROCHLORIDE 0.2 MCG/KG/MIN: 10 INJECTION INTRAVENOUS at 19:17

## 2021-01-01 RX ADMIN — ROCURONIUM BROMIDE 60 MG: 50 INJECTION, SOLUTION INTRAVENOUS at 17:02

## 2021-01-01 RX ADMIN — PANTOPRAZOLE SODIUM 40 MG: 40 INJECTION, POWDER, FOR SOLUTION INTRAVENOUS at 08:24

## 2021-01-01 RX ADMIN — PROPOFOL 20 MG: 10 INJECTION, EMULSION INTRAVENOUS at 15:14

## 2021-01-01 RX ADMIN — ACETAMINOPHEN 975 MG: 325 TABLET ORAL at 10:40

## 2021-01-01 RX ADMIN — ONDANSETRON 4 MG: 2 INJECTION INTRAMUSCULAR; INTRAVENOUS at 17:15

## 2021-01-01 RX ADMIN — OLANZAPINE 5 MG: 10 INJECTION, POWDER, LYOPHILIZED, FOR SOLUTION INTRAMUSCULAR at 17:59

## 2021-01-01 RX ADMIN — PHENYLEPHRINE HYDROCHLORIDE 100 MCG: 10 INJECTION INTRAVENOUS at 18:40

## 2021-01-01 RX ADMIN — LOSARTAN POTASSIUM 50 MG: 50 TABLET, FILM COATED ORAL at 08:06

## 2021-01-01 RX ADMIN — PIOGLITAZONE 15 MG: 15 TABLET ORAL at 12:09

## 2021-01-01 RX ADMIN — HYDRALAZINE HYDROCHLORIDE 10 MG: 20 INJECTION INTRAMUSCULAR; INTRAVENOUS at 04:09

## 2021-01-01 RX ADMIN — DIPHENHYDRAMINE HYDROCHLORIDE 50 MG: 50 INJECTION, SOLUTION INTRAMUSCULAR; INTRAVENOUS at 17:19

## 2021-01-01 RX ADMIN — SODIUM CHLORIDE, POTASSIUM CHLORIDE, SODIUM LACTATE AND CALCIUM CHLORIDE: 600; 310; 30; 20 INJECTION, SOLUTION INTRAVENOUS at 18:43

## 2021-01-01 RX ADMIN — PANTOPRAZOLE SODIUM 40 MG: 40 INJECTION, POWDER, FOR SOLUTION INTRAVENOUS at 09:03

## 2021-01-01 RX ADMIN — SODIUM CHLORIDE, POTASSIUM CHLORIDE, SODIUM LACTATE AND CALCIUM CHLORIDE: 600; 310; 30; 20 INJECTION, SOLUTION INTRAVENOUS at 15:01

## 2021-01-01 RX ADMIN — INSULIN GLARGINE 5 UNITS: 100 INJECTION, SOLUTION SUBCUTANEOUS at 22:03

## 2021-01-01 RX ADMIN — ASPIRIN 81 MG CHEWABLE TABLET 81 MG: 81 TABLET CHEWABLE at 09:43

## 2021-01-01 RX ADMIN — HEPARIN SODIUM 9000 UNITS: 1000 INJECTION, SOLUTION INTRAVENOUS; SUBCUTANEOUS at 16:19

## 2021-01-01 RX ADMIN — SODIUM CHLORIDE: 9 INJECTION, SOLUTION INTRAVENOUS at 07:19

## 2021-01-01 RX ADMIN — DOCUSATE SODIUM 50 MG AND SENNOSIDES 8.6 MG 1 TABLET: 8.6; 5 TABLET, FILM COATED ORAL at 08:01

## 2021-01-01 RX ADMIN — INSULIN GLARGINE 5 UNITS: 100 INJECTION, SOLUTION SUBCUTANEOUS at 21:01

## 2021-01-01 RX ADMIN — DEXMEDETOMIDINE HYDROCHLORIDE 0.5 MCG/KG/HR: 4 INJECTION, SOLUTION INTRAVENOUS at 15:19

## 2021-01-01 RX ADMIN — SODIUM CHLORIDE, POTASSIUM CHLORIDE, SODIUM LACTATE AND CALCIUM CHLORIDE: 600; 310; 30; 20 INJECTION, SOLUTION INTRAVENOUS at 15:50

## 2021-01-01 RX ADMIN — ACETAMINOPHEN 975 MG: 325 TABLET ORAL at 21:49

## 2021-01-01 RX ADMIN — PHENYLEPHRINE HYDROCHLORIDE 100 MCG: 10 INJECTION INTRAVENOUS at 17:16

## 2021-01-01 RX ADMIN — GLIPIZIDE 10 MG: 10 TABLET, EXTENDED RELEASE ORAL at 09:11

## 2021-01-01 RX ADMIN — LEVETIRACETAM 750 MG: 500 TABLET, FILM COATED ORAL at 20:51

## 2021-01-01 RX ADMIN — MAGNESIUM SULFATE HEPTAHYDRATE 2 G: 40 INJECTION, SOLUTION INTRAVENOUS at 19:41

## 2021-01-01 RX ADMIN — ASPIRIN 81 MG CHEWABLE TABLET 81 MG: 81 TABLET CHEWABLE at 08:24

## 2021-01-01 RX ADMIN — OXYCODONE HYDROCHLORIDE 5 MG: 5 TABLET ORAL at 01:38

## 2021-01-01 RX ADMIN — ASPIRIN 81 MG: 81 TABLET, COATED ORAL at 09:55

## 2021-01-01 RX ADMIN — PHENYLEPHRINE HYDROCHLORIDE 100 MCG: 10 INJECTION INTRAVENOUS at 16:19

## 2021-01-01 RX ADMIN — SODIUM CHLORIDE: 9 INJECTION, SOLUTION INTRAVENOUS at 15:01

## 2021-01-01 RX ADMIN — CLOPIDOGREL BISULFATE 75 MG: 75 TABLET ORAL at 08:24

## 2021-01-01 RX ADMIN — IOPAMIDOL 75 ML: 755 INJECTION, SOLUTION INTRAVENOUS at 12:26

## 2021-01-01 RX ADMIN — LORAZEPAM 1 MG: 2 INJECTION INTRAMUSCULAR; INTRAVENOUS at 15:38

## 2021-01-01 RX ADMIN — LIDOCAINE HYDROCHLORIDE 60 MG: 20 INJECTION, SOLUTION INFILTRATION; PERINEURAL at 15:09

## 2021-01-01 RX ADMIN — HYDROCHLOROTHIAZIDE 25 MG: 25 TABLET ORAL at 09:11

## 2021-01-01 RX ADMIN — HEPARIN SODIUM 5000 UNITS: 5000 INJECTION, SOLUTION INTRAVENOUS; SUBCUTANEOUS at 14:50

## 2021-01-01 ASSESSMENT — ENCOUNTER SYMPTOMS
CONFUSION: 1
ENDOCRINE NEGATIVE: 1
CARDIOVASCULAR NEGATIVE: 1
NEUROLOGICAL NEGATIVE: 1
CONSTITUTIONAL NEGATIVE: 1
MUSCULOSKELETAL NEGATIVE: 1
GASTROINTESTINAL NEGATIVE: 1
EYES NEGATIVE: 1
RESPIRATORY NEGATIVE: 1
SEIZURES: 1
ALLERGIC/IMMUNOLOGIC NEGATIVE: 1

## 2021-01-01 ASSESSMENT — ACTIVITIES OF DAILY LIVING (ADL)
ADLS_ACUITY_SCORE: 4
ADLS_ACUITY_SCORE: 11
WALKING_OR_CLIMBING_STAIRS_DIFFICULTY: NO
ADLS_ACUITY_SCORE: 4
ADLS_ACUITY_SCORE: 11
ADLS_ACUITY_SCORE: 5
ADLS_ACUITY_SCORE: 4
ADLS_ACUITY_SCORE: 11
ADLS_ACUITY_SCORE: 4
ADLS_ACUITY_SCORE: 11
ADLS_ACUITY_SCORE: 11
ADLS_ACUITY_SCORE: 5
ADLS_ACUITY_SCORE: 11
ADLS_ACUITY_SCORE: 5
ADLS_ACUITY_SCORE: 9
ADLS_ACUITY_SCORE: 11
ADLS_ACUITY_SCORE: 11
ADLS_ACUITY_SCORE: 5
ADLS_ACUITY_SCORE: 11
ADLS_ACUITY_SCORE: 4
ADLS_ACUITY_SCORE: 11
ADLS_ACUITY_SCORE: 4
ADLS_ACUITY_SCORE: 11
WHICH_OF_THE_ABOVE_FUNCTIONAL_RISKS_HAD_A_RECENT_ONSET_OR_CHANGE?: COGNITION
ADLS_ACUITY_SCORE: 16
ADLS_ACUITY_SCORE: 5
ADLS_ACUITY_SCORE: 4
ADLS_ACUITY_SCORE: 4
ADLS_ACUITY_SCORE: 11
DOING_ERRANDS_INDEPENDENTLY_DIFFICULTY: NO
DIFFICULTY_COMMUNICATING: NO
ADLS_ACUITY_SCORE: 11
ADLS_ACUITY_SCORE: 4
ADLS_ACUITY_SCORE: 5
DRESSING/BATHING_DIFFICULTY: NO
ADLS_ACUITY_SCORE: 26
DIFFICULTY_EATING/SWALLOWING: NO
ADLS_ACUITY_SCORE: 11
ADLS_ACUITY_SCORE: 12
ADLS_ACUITY_SCORE: 11
ADLS_ACUITY_SCORE: 5
ADLS_ACUITY_SCORE: 12
ADLS_ACUITY_SCORE: 12
ADLS_ACUITY_SCORE: 4
ADLS_ACUITY_SCORE: 11
ADLS_ACUITY_SCORE: 4
ADLS_ACUITY_SCORE: 5
ADLS_ACUITY_SCORE: 11
ADLS_ACUITY_SCORE: 12
ADLS_ACUITY_SCORE: 12
ADLS_ACUITY_SCORE: 5
FALL_HISTORY_WITHIN_LAST_SIX_MONTHS: NO
ADLS_ACUITY_SCORE: 4
ADLS_ACUITY_SCORE: 9
DEPENDENT_IADLS:: TRANSPORTATION
ADLS_ACUITY_SCORE: 11
CONCENTRATING,_REMEMBERING_OR_MAKING_DECISIONS_DIFFICULTY: NO
ADLS_ACUITY_SCORE: 5
ADLS_ACUITY_SCORE: 5
ADLS_ACUITY_SCORE: 11
ADLS_ACUITY_SCORE: 11
ADLS_ACUITY_SCORE: 5
ADLS_ACUITY_SCORE: 11
ADLS_ACUITY_SCORE: 9
ADLS_ACUITY_SCORE: 4
ADLS_ACUITY_SCORE: 5
ADLS_ACUITY_SCORE: 11
ADLS_ACUITY_SCORE: 9
ADLS_ACUITY_SCORE: 4
TOILETING_ISSUES: NO

## 2021-01-01 ASSESSMENT — PATIENT HEALTH QUESTIONNAIRE - PHQ9
SUM OF ALL RESPONSES TO PHQ QUESTIONS 1-9: 0
SUM OF ALL RESPONSES TO PHQ QUESTIONS 1-9: 0
SUM OF ALL RESPONSES TO PHQ QUESTIONS 1-9: 2

## 2021-01-01 ASSESSMENT — PAIN SCALES - GENERAL: PAINLEVEL: NO PAIN (0)

## 2021-01-01 ASSESSMENT — MIFFLIN-ST. JEOR
SCORE: 1656.38
SCORE: 1600.46
SCORE: 1667.41
SCORE: 1623.14
SCORE: 1677.57

## 2021-01-07 ENCOUNTER — OFFICE VISIT - HEALTHEAST (OUTPATIENT)
Dept: EDUCATION SERVICES | Facility: CLINIC | Age: 66
End: 2021-01-07

## 2021-01-07 DIAGNOSIS — E11.3313 TYPE 2 DIABETES MELLITUS WITH BOTH EYES AFFECTED BY MODERATE NONPROLIFERATIVE RETINOPATHY AND MACULAR EDEMA, WITHOUT LONG-TERM CURRENT USE OF INSULIN (H): ICD-10-CM

## 2021-01-21 ENCOUNTER — OFFICE VISIT - HEALTHEAST (OUTPATIENT)
Dept: EDUCATION SERVICES | Facility: CLINIC | Age: 66
End: 2021-01-21

## 2021-01-21 DIAGNOSIS — E11.3313 TYPE 2 DIABETES MELLITUS WITH BOTH EYES AFFECTED BY MODERATE NONPROLIFERATIVE RETINOPATHY AND MACULAR EDEMA, WITHOUT LONG-TERM CURRENT USE OF INSULIN (H): ICD-10-CM

## 2021-06-01 NOTE — TELEPHONE ENCOUNTER
Triage note:    64 year old male called with concerns about hypertension.    He was evaluated in the ED a couple weeks ago with HTN (/100) and change in consciousness. He was aware of everything but it 'seemed outside himself'. He states it is hard to explain.  Today he checked his BP and it is 160/106. His face is flushed but he has no other symptoms.  He takes Hydrochlorothiazide 25 mg once daily.  He denies any other symptoms.    Rn triaged to be seen within 2 weeks. He agreed. Patient warm transferred to scheduling for appointment.  He is scheduled 10/10/19 @ 1 pm with Dr. Perkins @ [facility].    Tram Mckinley RN, [facility] Refill/Triage, 10/2/2019 10:58 AM      Reason for Disposition    Systolic BP >= 160 OR Diastolic >= 100    Protocols used: HIGH BLOOD PRESSURE-A-OH

## 2021-06-01 NOTE — TELEPHONE ENCOUNTER
"\"I feel like I am walking in mud or something\"  Just vomited large clear along with vegetables.  Sx started 5 hours ago.  \"Things are in slow motion.\"  Headache pain behind both eyes especially when coughing.      Drank pineapple juice and is laying down.  So far no helpful measure has been observed.  He is oriented to day of week, time.      Instructed patient to dial 911 for emergency help and he states he will.    Franci Bella RN, triage    Reason for Disposition    Difficult to awaken or acting confused (disoriented, slurred speech) and has diabetes    Protocols used: CONFUSION - DELIRIUM-A-OH      "

## 2021-06-02 NOTE — TELEPHONE ENCOUNTER
"Called and left message for pt to call clinic back#1  \"Okay to relay message\"    ----- Message from Jack Perkins MD sent at 10/11/2019  8:00 AM CDT -----  Please call patient.  Tell patient:   Labs confirm dx diabetes mellitus Hyperlipidemia protein in urine  Along with Hypertension  I sent new rx  atorv  Enalapril 5 mg - hydrochlorothiazide 12.5 mg  Two times a day  Met two times a day asa/d    Stop the hctz 25 (it's in the other)   Visit six wks to check labs    Please call the pharmacy to stop the hctz 25      "

## 2021-06-02 NOTE — TELEPHONE ENCOUNTER
"Patient Returning Call  Reason for call:  Message from clinic  Information relayed to patient:  Note      Called and left message for pt to call clinic back#1  \"Okay to relay message\"     ----- Message from Jack Perkins MD sent at 10/11/2019  8:00 AM CDT -----  Please call patient.  Tell patient:   Labs confirm dx diabetes mellitus Hyperlipidemia protein in urine  Along with Hypertension  I sent new rx  atorv  Enalapril 5 mg - hydrochlorothiazide 12.5 mg  Two times a day  Met two times a day asa/d    Stop the hctz 25 (it's in the other)   Visit six wks to check labs     Please call the pharmacy to stop the hctz 25             Patient has additional questions:  Yes  If YES, what are your questions/concerns:  Patient states he can not tolerate metformin and will not take it.  He is ok with starting atorvastatin and enalapril-HCTZ.  He will discontinue the HCTZ 25 mg.  He is going to go back to working out routinely and monitoring his food intake to lose weight.   Since he won't be taking the Metformin, should he still return in six weeks for lab check?  Okay to leave a detailed message?: Yes  "

## 2021-06-02 NOTE — PROGRESS NOTES
Few wks ago.  ED.  Was not self.  I was there, but weird.   I wasn't part of me.  Couldn't come out of it.   Was appropriately conversing.   Lasted into next day.  Now is fine.    bp 140- 169     Is taking the hctz    Two left.  Missed 3/wk    Hx diabetes mellitus but dropped 70 pounds and diabetes mellitus gone.      Gained back 16.    Labs in ED  290s    Prior surg hosp 80s appy  Fmh: neg  Med hctz 25  nkda    Six months ago worked out.   Daily.   Different body parts.   Nothing strenuous.  Different muscle groups every time.     Felt good.  30-60 minutes  hab neg cig.   etoh neg  Fhsh:   Two kids  Six businesses.  Excavation  Printing  Marketing.      Recent stress in business dealings.    ldl 170s     ROS:  Constitutional: denies fever  Vision: denies change in vision  ENT: denies cough or congestion  Thyroid: denies unusual fatigue  Resp: denies shortness of breath  Card: denies palpitations  GI: denies vomiting or abnormal stool, melena, hematochezia  : denies dysuria  Neuro: denies numbness or weakness  Derm: denies rash  Joints: denies redness or swelling  Endo: denies polyuria  Mental health: mood is good  Extremities: no edema  Mobility: stable    Otherwise negative review of systems    Colonoscopy about 10 years ago         OBJECTIVE:   Vitals:    10/10/19 1318   BP: 142/90   Pulse: 76   Resp: 16      Wt is noted.  No diaphoresis  Eyes: nl eom, anicteric   External ears, nose: nl    Neck: nl nodes, supple, thyroid normal   Lungs: clear to ausc   Heart: regular rhythm  Abd: soft nontender     No cva (renal) tenderness  Neuro: no weakness  Skin no rash  Joints: uninflamed   No ketotic breath odor noted  Mental: euthymic  Ext: nontender calves   Gait: normal    Body mass index is 29.47 kg/m .      ASSESSMENT/PLAN:    Hx diabetes mellitus and acei statin and lost weight and off meds.     Recent increase wt.  Seen ED vague sx.  Random glucose 295     Now without sxs.    Likely diabetes mellitus  returns   Will check parameter and follow up per findings    Continue same hctz for bp for now.   Hold off on colon for now.    1. Screen for colon cancer     2. Hyperglycemia  Microalbumin, Random Urine    Glycosylated Hemoglobin A1c   3. Pure hypercholesterolemia  LDL Cholesterol, Direct   4. Essential hypertension, benign  Basic Metabolic Panel   5. HTN (hypertension)  hydroCHLOROthiazide (HYDRODIURIL) 25 MG tablet    Basic Metabolic Panel       Labs confirm    Additional diagnoses and related orders:  1. Screen for colon cancer     2. Hyperglycemia  Microalbumin, Random Urine    Glycosylated Hemoglobin A1c    metFORMIN (GLUCOPHAGE) 500 MG tablet   3. Pure hypercholesterolemia  LDL Cholesterol, Direct    atorvastatin (LIPITOR) 40 MG tablet   4. Essential hypertension, benign  Basic Metabolic Panel    enalapril-hydrochlorothiazide 5-12.5 mg per tablet   5. HTN (hypertension)  hydroCHLOROthiazide (HYDRODIURIL) 25 MG tablet    Basic Metabolic Panel   6. Other proteinuria  enalapril-hydrochlorothiazide 5-12.5 mg per tablet   7. Type 2 diabetes mellitus without complication, without long-term current use of insulin (H)  aspirin 81 MG EC tablet     Six wks follow up   Stop the hctz 25

## 2021-06-09 NOTE — PROGRESS NOTES
Subjective:    Jorge Young is a 65 y.o. male who presents with chief complaint of hospital discharge follow-up.  I requested his records through care everywhere and I reviewed discharge summary presented to the ER on 7/4/2020 with left sided arm numbness.  He says he woke up at about 3 AM felt his arm was numb and tried to get back to normal.  It did not work so he went back to sleep.  He woke up several hours later tried to get a drink of water and could not hold the cup.  He went to the ER.  He was admitted 7/4/2020, discharge 7/6/2020.  Diagnosed with acute embolic stroke.  Secondary diagnoses type 2 diabetes and hypertension.  Echo at the hospital was normal, no A. fib, A1c =8.5%.  I reviewed that lab today.  He cannot tolerate metformin because it made him very sick and nauseous.  He is supposed to have a repeat BMP done today.    He says he is feeling well, left arm and leg are pretty much back to normal.  He continues to do physical therapy.  He says he put on money to get his teeth fixed prior to pandemic.  He put money down to get that surgery done, but he cannot do it because of his recent stroke.  He did get his money back for that.  However he also had a surgery scheduled with [facility].  He needs a doctor's note to in order to get his money refunded.    Medications reconciled.  He is taking sertraline, losartan-hydrochlorothiazide, Lipitor, daily aspirin, and Victoza.  Feels like Victoza is working well.  He has not yet checked his blood sugars.  He does have the equipment at home to do so.  He is not taking glipizide.  Has Cardiology and neurology follow-up scheduled.  Mild constipation, normal appetite, otherwise feeling well.    Current, past medical, surgical, family history: Reviewed and updated in chart.  Medications reconciled today by myself.    Current Outpatient Medications:      aspirin 81 MG EC tablet, Take 1 tablet (81 mg total) by mouth daily., Disp: 150 tablet, Rfl: 2      atorvastatin (LIPITOR) 80 MG tablet, Take 80 mg by mouth., Disp: , Rfl:      liraglutide (VICTOZA 2-MARCIA) 0.6 mg/0.1 mL (18 mg/3 mL) PnIj injection, Inject 1.8 mg under the skin., Disp: , Rfl:      losartan-hydrochlorothiazide (HYZAAR) 50-12.5 mg per tablet, Take 1 tablet by mouth., Disp: , Rfl:      sertraline (ZOLOFT) 25 MG tablet, Take one tablet for 1 week then increase to 2 tablets daily (50mg)  Indications: Major Depressive Disorder, Disp: , Rfl:      Objective:   Allergies:  Other allergy (see comments)    Vitals:  Vitals:    07/16/20 0941   BP: 112/76   Patient Site: Left Arm   Patient Position: Sitting   Cuff Size: Adult Regular   Pulse: 88   Resp: 16   Weight: 181 lb 12 oz (82.4 kg)     Body mass index is 28.05 kg/m .    Vital signs reviewed.  General: Patient is alert and oriented x 3, in no apparent distress  Cardiac: regular rate and rhythm, no murmurs  Pulmonary: lungs clear to auscultation bilaterally, no crackles, rales, rhonchi, or wheezing noted  Musculoskeletal: 4/5 strength in major muscle groups in upper and lower muscle groups bilaterally, generally full active range of motion of his left hand    Results for orders placed or performed in visit on 07/16/20   Basic Metabolic Panel   Result Value Ref Range    Sodium 139 136 - 145 mmol/L    Potassium 4.2 3.5 - 5.0 mmol/L    Chloride 99 98 - 107 mmol/L    CO2 28 22 - 31 mmol/L    Anion Gap, Calculation 12 5 - 18 mmol/L    Glucose 118 70 - 125 mg/dL    Calcium 10.1 8.5 - 10.5 mg/dL    BUN 20 8 - 22 mg/dL    Creatinine 1.14 0.70 - 1.30 mg/dL    GFR MDRD Af Amer >60 >60 mL/min/1.73m2    GFR MDRD Non Af Amer >60 >60 mL/min/1.73m2   HM2(CBC w/o Differential)   Result Value Ref Range    WBC 10.9 4.0 - 11.0 thou/uL    RBC 5.51 4.40 - 6.20 mill/uL    Hemoglobin 15.9 14.0 - 18.0 g/dL    Hematocrit 47.0 40.0 - 54.0 %    MCV 85 80 - 100 fL    MCH 28.9 27.0 - 34.0 pg    MCHC 33.9 32.0 - 36.0 g/dL    RDW 11.9 11.0 - 14.5 %    Platelets 219 140 - 440 thou/uL     MPV 8.3 7.0 - 10.0 fL       Assessment and Plan:   1. Hospital discharge follow-up  2. Acute embolic stroke (H)  Overall doing well.  No acute concerns.  He is continuing with physical therapy.  He has follow-up appointments scheduled with cardiology and neurology.  Follow-up screening labs normal.  - Basic Metabolic Panel  - HM2(CBC w/o Differential)    3. Controlled type 2 diabetes mellitus with complication, without long-term current use of insulin (H)  4. Other proteinuria  A1c: 8.5% 1 week ago in hospital  Eye Exam: not done yet, I recommended he get this done soon  Foot Exam: not done today  Smoking: no  On a statin: yes  Blood Pressure: controlled  Microalbumin: UTD, abnormal  On ACE inhibitor: yes  Continue Victoza.  Can't tolerate metformin.  Re-check A1c in 3 months.    5. Essential hypertension  Well controlled on present medication.  Screening labs up-to-date.    6. Hyperlipidemia, unspecified hyperlipidemia type  Atorvastatin recently increased from 40mg to 80 mg daily at hospital.  Continue present medication.      This dictation uses voice recognition software, which may contain typographical errors.
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
## 2021-06-13 NOTE — TELEPHONE ENCOUNTER
I spoke with Dr. Scott, Ophthalmology, Thursday afternoon.  He saw patient in clinic for screening diabetic eye exam.  Ischemic optic neuropathy right eye, with some vision loss.  Also some diabetic eye changes.  He referred patient to Retina specialists.  Patient told provider he wasn't regularly taking his BP med.  Provider emphasized the extreme importance of maintaining good BP and blood pressure control with patient.  I reviewed with provider that patient has referrals made to Cardiology and neurology for stoke f/u.  Has appt with Cardiology next week.  Doesn't look like he has been scheduled for DM Ed yet.  I will send out DM Educator a message.    Only taking glipizide, can't tolerate Metformin, Victoza was too expensive.  11/25/20 A1c = 8.5%  Last A1c 9.0% on 10/10/19

## 2021-06-13 NOTE — PATIENT INSTRUCTIONS - HE
Jorge Young,    It was a pleasure to see you today at the [facility].     My recommendations after this visit include:    Heart rhythm monitor  Stress test    GEMA Girard MD, FACC, OZZY

## 2021-06-13 NOTE — TELEPHONE ENCOUNTER
----- Message from Di Steiner, Diabetes Ed sent at 12/4/2020  7:53 AM CST -----  Regarding: appointment  Would you please reach out to Jorge to schedule an appointment for DM education per his PA.    Thank you,    Di IZQUIERDO

## 2021-06-13 NOTE — TELEPHONE ENCOUNTER
Provider Communication  Who is calling:  Dr Scott  Facility in which provider is associated:  [facility]   Reason for call:  Saw patient this morning   Urgency for return call:  as available  Okay to leave detailed message?:  Yes

## 2021-06-13 NOTE — TELEPHONE ENCOUNTER
Wellness Screening Tool  Symptom Screening:  Do you have one of the following NEW symptoms:    Fever (subjective or >100.0)?  No    A new cough?  No    Shortness of breath?  No     Chills? No     New loss of taste or smell? No     Generalized body aches? No     New persistent headache? No     New sore throat? No     Nausea, vomiting, or diarrhea?  No    Within the past 2 weeks, have you been exposed to someone with a known positive illness below:    COVID-19 (known or suspected)?  No    Chicken pox?  No    Mealses?  No    Pertussis?  No    Patient notified of visitor policy- No visitors are allowed to accompany the patient at this time. Yes  Pt informed to wear a mask: Yes  Pt notified if they develop any symptoms listed above, prior to their appointment, they are to call the clinic directly at [phone] for further instructions.  Yes  Patient's appointment status: Patient will be seen in clinic as scheduled on 12/7/20

## 2021-06-14 NOTE — PROGRESS NOTES
Attempted to reach Jorge 3 times for his video visit today. Left 3 messages on his answering machine. The diabetes schedulers will call to reschedule.    Beatriz Phan, RD, LD, [facility]  Certified Diabetes Care and   1/20/2021

## 2021-06-14 NOTE — PATIENT INSTRUCTIONS - HE
1. Test blood sugar once per day. Key times are before breakfast or bedtime.  2. Eat smaller amounts more often. Avoid skipping meals.  3. Avoid sugary drinks (regular soda, lemonade, sweetened tea and Gatorade). Drink more water.  4. Eat fresh fruit instead of juice.  5. Include high fiber, whole grain foods instead of processed white foods. Healthier choices are whole grain cereals, whole wheat pasta, brown or wild rice and whole wheat bread.   6. Aim for foods with 3 grams or more fiber per serving. Limit added sugars to 36 grams per day for men.  7. Stay active every day; try not to sit for more than 30 minutes and walk 20-30 minutes most days of the week.   8. Next video visit in 2-3 weeks on 1/21/2021 at 8:30.     Blood sugar goals:   Before meals:   1-2 hours after meals: less 200  Bedtime: 100-160    A1c goal of less than 8.0% (estimated average blood sugar of 183 on meter)

## 2021-06-14 NOTE — PROGRESS NOTES
Diabetes Educator has received verbal consent for a telephone visit for the patient.    Audio-only visit done, as patient does not have access to audio-visual technology.    Individual visit provided, given no group classes are available for 2 months.     DIABETES CARE PLAN    Assessment/Plan:     Initial telephone visit for diabetes education and counseling. Patient was not able to connect for the video visit so I called him. The last A1c was above the ADA goal of less than 8.0%. Jorge is not checking his blood sugar. He received a True Metrix meter in the mail but has not learned how to use it. Encouraged him to read the manual or watch a YouTube video on how to use the meter. Patient has been working hard to manage his blood sugar and weight. For the past 2 months he was using a meal replacement protein shake twice per day and eating one meal per day. He lost 70 pounds with this eating style. Requested he avoid fasting until he starts checking his blood sugar. Not eating regular meals while on Glipizide XL can cause hypoglycemia. Severe low blood sugar less than 50 can cause loss of consciousness.     Instructed on diabetes basics: A1c and blood sugar goals, progression of diabetes, diet and exercise and symptoms and treatment of high and low blood sugar. Jorge understands a lot about nutrition and how food affects blood sugar. Prior to his stroke he was walking 10-15 miles every day and doing calisthenics.    Current Diabetes Medications:   Glipizide XL 10 mg per day  Did not tolerate Metformin  Stopped Victoza due to cost    PLAN:   1. Test blood sugar once per day. Key times are before breakfast or bedtime.  2. Eat smaller amounts more often. Avoid skipping meals.  3. Avoid sugary drinks (regular soda, lemonade, sweetened tea and Gatorade). Drink more water.  4. Eat fresh fruit instead of juice.  5. Include high fiber, whole grain foods instead of processed white foods. Healthier choices are whole grain cereals,  whole wheat pasta, brown or wild rice and whole wheat bread.   6. Aim for foods with 3 grams or more fiber per serving. Limit added sugars to 36 grams per day for men.  7. Stay active every day; try not to sit for more than 30 minutes and walk 20-30 minutes most days of the week.   8. Next video visit in 2-3 weeks on 1/21/2021 at 8:30.     Blood sugar goals:   Before meals:   1-2 hours after meals: less 200  Bedtime: 100-160    A1c goal of less than 8.0% (estimated average blood sugar of 183 on meter)      Subjective/Objective:     Jorge Young is a 65 y.o. male referred by Ashley Narvaez PA-C. Diagnosesd with diabetes about 5 years ago per patient.    Wt Readings from Last 3 Encounters:   12/07/20 197 lb (89.4 kg)   11/25/20 198 lb (89.8 kg)   07/16/20 181 lb 12 oz (82.4 kg)     Food Recall: Fasts occasionally. Skips lunch and dinner  a protein and has a drink. Lost 70 pounds in 2 months.  Eats 2-3 meals per day and small snacks. No sugar soda in 5 years. Lots of protein  Breakfast: Eggs and hash or 2 waffles with light syrup, 2 slices merlos or oatmeal with pineapple  Lunch: May skip if working.   Dinner: Lots of protein and vegetables. Loves potatoes.  Snacks: watermelon, dates, nuts    Activity: Works out every day. Planks, squats, burpees. Used to walk 10-12 miles per day. Less walking since seizure.    SMBG pattern/BG ranges: Does not test BG       Lab Results   Component Value Date    HGBA1C 8.5 (H) 11/25/2020       EDUCATION RECORD    Literature sent via email with permission  FV Understanding Diabetes  [facility] Healthy Snack Ideas  ADA Factors that raise and lower BG    Monitoring   Meter: Discussed  Monitoring: Discussed   BG goals: Discussed and Literature provided    Nutrition Management  Nutrition Management: Discussed and Literature provided  Weight: Assessed and Discussed  Portions/Balance: Assessed and Discussed  Carb ID/Count: Discussed, Literature provided and Competent  Label Reading:  Discussed, Literature provided and Competent  Heart Healthy Fats: Discussed, Literature provided and Competent    Physical Activity: Discussed and Literature provided and Competent    Medications: Discussed    Diabetes Disease Process: Discussed and Literature provided    Acute Complications: Prevent, Detect, Treat:  Hypoglycemia: Discussed and Literature provided  Hyperglycemia: Discussed and Literature provided  Sick Days: Not addressed    Chronic Complications  Foot Care:Not addressed  Skin Care: Not addressed  Eye: Not addressed  ABC: Not addressed  Teeth:Not addressed  Goal Setting and Problem Solving: Discussed and Literature provided  Barriers: Assessed and bilateral hearing loss, history of depressive disorder  Psychosocial Adjustments: Assessed    Time: 45 Minutes   Previous Education: no, first diabetes education while on Medicare  Visit Type:Medical Nutrition Therapy, Initial (36229)  Hours Remaining: DSMT 10 and MNT 2 hours and 15 minutes until 1/6/2022    Diagnosis per referral: Type 2 diabetes with complication, without long-term use of insulin (E11.8)    Beatriz Phan RD, LD, [facility]  Certified Diabetes Care and   1/6/2021

## 2021-06-16 PROBLEM — R80.9 PROTEINURIA: Status: ACTIVE | Noted: 2020-07-16

## 2021-06-16 PROBLEM — E11.311 DIABETIC MACULAR EDEMA OF BOTH EYES (H): Status: ACTIVE | Noted: 2020-12-04

## 2021-06-16 PROBLEM — H47.011 NON-ARTERITIC ANTERIOR ISCHEMIC OPTIC NEUROPATHY OF RIGHT EYE: Status: ACTIVE | Noted: 2020-12-04

## 2021-06-16 PROBLEM — I63.9 ACUTE EMBOLIC STROKE (H): Status: ACTIVE | Noted: 2020-07-06

## 2021-06-16 PROBLEM — R55 SYNCOPE, UNSPECIFIED SYNCOPE TYPE: Status: ACTIVE | Noted: 2020-12-07

## 2021-06-16 NOTE — PROGRESS NOTES
Subjective:    Jorge Young is a 65 y.o. male who presents with chief complaint of diabetes check.  He notes he is normally taking all of his medicines as directed.  He did not take any medicines this morning, as he is not sure if he should before his lab tests.  He does not check his sugars regularly.  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  He works in computers.  He says recently within the past few weeks, he has made a renewed effort to improve his eating and exercise.  He notes he has a problem with his right eye where he gets shots regularly.  Something to do with his retina.  Possibly associated with diabetes.  Last office visit with the eye doctor was approximately 4/15/2021.  He has another visit scheduled in May.    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 Take 10 mg by mouth daily., Disp: 90 tablet, Rfl: 3     losartan-hydrochlorothiazide (HYZAAR) 50-12.5 mg per tablet, Take 1 tablet by mouth daily., Disp: 90 tablet, Rfl: 3     sertraline (ZOLOFT) 25 MG tablet, 2 tablets daily (50mg)  Indications: Major  "Depressive Disorder, Disp: 180 tablet, Rfl: 3     pioglitazone (ACTOS) 15 MG tablet, Take 1 tablet (15 mg total) by mouth daily., Disp: 30 tablet, Rfl: 11     Objective:   Allergies:  Other allergy (see comments)    Vitals:  Vitals:    04/19/21 0836 04/19/21 0940   BP: (!) 165/100 120/80   Patient Site: Left Arm Left Arm   Patient Position: Sitting Sitting   Cuff Size: Adult Regular Adult Large   Pulse: 79    Temp: 97  F (36.1  C)    TempSrc: Temporal    SpO2: 98%    Weight: 202 lb 4 oz (91.7 kg)    Height: 5' 7.72\" (1.72 m)        Body mass index is 31.01 kg/m .    Vital signs reviewed.  General: Patient is alert and oriented x 3, in no apparent distress  Cardiac: regular rate and rhythm, no murmurs  Pulmonary: lungs clear to auscultation bilaterally, no crackles, rales, rhonchi, or wheezing noted    Results for orders placed or performed in visit on 04/19/21   Glycosylated Hemoglobin A1c   Result Value Ref Range    Hemoglobin A1c 9.9 (H) <=5.6 %       Assessment and Plan:   1. Uncontrolled type 2 diabetes mellitus with hyperglycemia (H)  1. Diabetes Check  A1c: 9.9%  Previous A1c 8.5% in November 2020  Eye Exam: UTD  Foot Exam: UTD  Smoking: No  On a statin: yes  Blood Pressure: controlled  Microalbumin: UTD  On ACE inhibitor: yes, on ARB  Just taking glipizide XL once daily.  Cannot tolerate metformin.  Victoza too expensive.  I think Actos would be most compatible with him, and hopefully covered by his insurance.  Start Actos today as below.  Will call in 1 month to check-on, then A1c check in 3 months.  - Glycosylated Hemoglobin A1c  - pioglitazone (ACTOS) 15 MG tablet; Take 1 tablet (15 mg total) by mouth daily.  Dispense: 30 tablet; Refill: 11    2. Essential hypertension  Generally under control.  He did not take his blood pressure medicine this morning.  No acute concerns today.    3. Type 2 diabetes mellitus with both eyes affected by moderate nonproliferative retinopathy and macular edema, without " long-term current use of insulin (H)  4. Diabetic macular edema of both eyes (H)  Seeing eye doctor regularly.  Last visit was about 1 week ago.  Next visit in May.  - Glycosylated Hemoglobin A1c  - pioglitazone (ACTOS) 15 MG tablet; Take 1 tablet (15 mg total) by mouth daily.  Dispense: 30 tablet; Refill: 11    5. Hyperlipidemia, unspecified hyperlipidemia type  Taking cholesterol medicine daily.  Up-to-date on cholesterol screening.    6. Acute embolic stroke (H)  Reviewed medicines, taking baby aspirin daily.    7. Other proteinuria  Microalbuminuria.  Last check in November was abnormal.    8. Seizure disorder as sequela of cerebrovascular accident (H)  No concerns.  No recent seizures.    This dictation uses voice recognition software, which may contain typographical errors.

## 2021-06-20 NOTE — LETTER
Letter by Ashley Narvaez PA-C at      Author: Ashley Narvaez PA-C Service: -- Author Type: --    Filed:  Encounter Date: 7/16/2020 Status: (Other)         07/16/20    To Whom It May Concern:    Jorge Young (1955) was seen today at [facility].  Due to recent health issues, he is unable to have his scheduled surgery.  Please refund him the money he has already paid for his surgery.    Thank you for your attention to this matter.    Sincerely,    Ashley Narvaez PA-C

## 2021-06-21 NOTE — LETTER
Letter by Patricia Fried RN at      Author: Patricia Fried RN Service: -- Author Type: --    Filed:  Encounter Date: 12/31/2020 Status: (Other)         Jorge Young  [address] [place] [address]             December 31, 2020         Dear Mr. Young,    Below are the results from your recent visit:    Resulted Orders   Echo Stress Exercise   Result Value Ref Range    Pharmacologic Protocol  Stress Echo     Test Type Treadmill     Baseline HR 75     Baseline Systolic      Baseline Diastolic BP 90     Last Stress      Last Stress Systolic      Last Stress Diastolic BP 80     Target      PERCENT HR 85%     Exercise duration (min) 6     Exercise duration (sec) 1     Estimated workload 7.3     ST Deviation Elevation aVL 0.4mm     Deviation Time II -1.9mm     ST Elevation Amount aVR 1.4mm     ST Deviation Amount he II -1.9mm     Final Resting /79     Final Resting HR 86     Max Treadmill Speed 3.4     Max Treadmill Grade 14.0     Peak Systolic /96     Peak Diastolic /104     Max      Stress Phase Resting     Stress Resting Pt Position SUPINE     Current HR 71     Current /84     Stress Phase Resting     Stress Resting Pt Position STANDING     Current HR 74     Current /90     Stress Phase Resting     Stress Resting Pt Position MANUAL EVENT     Current      Current /80     Stress Phase Stress     Stage Minute EXE 00:00     Exercise Stage STAGE 1     Current HR 77     Current /84     Stress Phase Stress     Stage Minute EXE 01:00     Exercise Stage STAGE 1     Current HR 89     Current /84     Stress Phase Stress     Stage Minute EXE 02:00     Exercise Stage STAGE 1     Current HR 99     Current /84     Stress Phase Stress     Stage Minute EXE 02:11     Exercise Stage STAGE 1     Current      Current /84     Stress Phase Stress     Stage Minute EXE 03:00     Exercise Stage STAGE 2     Current       Current /84     Stress Phase Stress     Stage Minute EXE 03:33     Exercise Stage STAGE 2     Current      Current /84     Stress Phase Stress     Stage Minute EXE 04:00     Exercise Stage STAGE 2     Current      Current /84     Stress Phase Stress     Stage Minute EXE 04:28     Exercise Stage STAGE 2     Current      Current /84     Stress Phase Stress     Stage Minute EXE 05:00     Exercise Stage STAGE 2     Current      Current /84     Stress Phase Stress     Stage Minute EXE 05:12     Exercise Stage STAGE 2     Current      Current /80     Stress Phase Stress     Stage Minute EXE 05:18     Exercise Stage STAGE 2     Current      Current /80     Stress Phase Stress     Stage Minute EXE 06:00     Exercise Stage STAGE 3     Current      Current /80     Stress Phase Stress     Stage Minute EXE 06:01     Exercise Stage STAGE 3     Current      Current /80     Stress Phase Recovery     Stage Minute REC 00:58     Exercise Stage Recovery     Current      Current /80     Stress Phase Recovery     Stage Minute REC 01:58     Exercise Stage Recovery     Current      Current /80     Stress Phase Recovery     Stage Minute REC 02:07     Exercise Stage Recovery     Current      Current /104     Stress Phase Recovery     Stage Minute REC 02:50     Exercise Stage Recovery     Current      Current /96     Stress Phase Recovery     Stage Minute REC 02:58     Exercise Stage Recovery     Current      Current /96     Stress Phase Recovery     Stage Minute REC 03:55     Exercise Stage Recovery     Current HR 97     Current /92     Stress Phase Recovery     Stage Minute REC 03:58     Exercise Stage Recovery     Current HR 97     Current /92     Stress Phase Recovery     Stage Minute REC 04:58     Exercise Stage Recovery     Current HR 91     Current /92      Stress Phase Recovery     Stage Minute REC 05:44     Exercise Stage Recovery     Current HR 90     Current /85     Stress Phase Recovery     Stage Minute REC 05:58     Exercise Stage Recovery     Current HR 91     Current /85     Stress Phase Recovery     Stage Minute REC 06:58     Exercise Stage Recovery     Current HR 92     Current /85     Stress Phase Recovery     Stage Minute REC 07:58     Exercise Stage Recovery     Current HR 87     Current /85     Stress Phase Recovery     Stage Minute REC 08:58     Exercise Stage Recovery     Current HR 86     Current /85     Stress Phase Recovery     Stage Minute REC 09:47     Exercise Stage Recovery     Current HR 87     Current /79     Stress Phase Recovery     Stage Minute REC 09:58     Exercise Stage Recovery     Current HR 86     Current /79     Stress Phase Recovery     Stage Minute REC 10:16     Exercise Stage Recovery     Current HR 86     Current /79     Calculated Percent HR 97 %    Rate Pressure Product 28,200.0     Narrative    1. Normal stress echocardiogram without evidence of stress induced   ischemia.   2. Normal resting LV systolic performance with an ejection fraction of   55-60%. There is normal improvement in left ventricular systolic   performance with a peak ejection fraction of 70-75%.  3. No ECG evidence of ischemia.  4. No anginal chest pain reported with exercise.  5. Average functional capacity for age.  6. Occasional PACs were noted during the study.  No more advanced rhythm   disturbances detected.    Stress Summary: Patient exercised on the Hugh protocol for a total of   6:01 minutes. Peak heart rate achieved was 150 b.p.m (96% max.) with a   double-product of 26,508. The patient stopped exercise due to generalized   fatigue. No chest pain symptoms were reported.    Electrocardiogram: Baseline ECG reveals normal sinus rhythm without   evidence of prior myocardial injury. With exercise, there  are no   significant ECG changes to suggest ischemia.  Mild T wave inversion is   noted in the inferior and anterolateral leads in the recovery phase   (nonspecific).  Occasional PACs noted.    Baseline echocardiogram: Technically adequate images were obtained in the   standard quad-screen format. LV systolic performance is normal with a   visually estimated ejection fraction of 55-60%. There is normal regional   wall motion.     Post exercise echocardiogram: Technically adequate images were obtained   immediately post exercise in the standard quad-screen format. LV systolic   performance normally improves with a peak ejection fraction of 70-75%.   There are no stress induced regional wall motion abnormalities.      The test results show that your stress test was normal. We will await the results of your cardiac monitor. Here is his message:    Kemal Girard (Alfredito), MD Fried, Patricia AVELAR RN             Normal, reassurance, no new orders          Please call with questions or contact us using [employer].    Sincerely,    Patricia REYNAGA

## 2021-06-21 NOTE — LETTER
Letter by Ashley Narvaez PA-C at      Author: Ashley Narvaez PA-C Service: -- Author Type: --    Filed:  Encounter Date: 12/7/2020 Status: (Other)         Jorge Young  [address] [place] [address]             December 7, 2020         Dear Mr. Young,    Below are the results from your recent visit:    Resulted Orders   Glycosylated Hemoglobin A1c   Result Value Ref Range    Hemoglobin A1c 8.5 (H) <=5.6 %      Comment:      Normal <5.7% Prediabete 5.7-6.4% Diabletes 6.5% or higher - adopted from ADA consensus guidelines   Microalbumin, Random Urine   Result Value Ref Range    Microalbumin, Random Urine 52.97 (H) 0.00 - 1.99 mg/dL    Creatinine, Urine 92.8 mg/dL    Microalbumin/Creatinine Ratio Random Urine 570.8 (H) <=19.9 mg/g    Narrative    Microalbumin, Random Urine  <2.0 mg/dL . . . . . . . . Normal  3.0-30.0 mg/dL . . . . . . Microalbuminuria  >30.0 mg/dL . . . . . .  . Clinical Proteinuria    Microalbumin/Creatinine Ratio, Random Urine  <20 mg/g . . . . .. . . . Normal   mg/g . . . . . . . Microalbuminuria  >300 mg/g . . . . . . . . Clinical Proteinuria       Comprehensive Metabolic Panel   Result Value Ref Range    Sodium 138 136 - 145 mmol/L    Potassium 4.0 3.5 - 5.0 mmol/L    Chloride 100 98 - 107 mmol/L    CO2 27 22 - 31 mmol/L    Anion Gap, Calculation 11 5 - 18 mmol/L    Glucose 157 (H) 70 - 125 mg/dL    BUN 18 8 - 22 mg/dL    Creatinine 1.01 0.70 - 1.30 mg/dL    GFR MDRD Af Amer >60 >60 mL/min/1.73m2    GFR MDRD Non Af Amer >60 >60 mL/min/1.73m2    Bilirubin, Total 0.5 0.0 - 1.0 mg/dL    Calcium 9.5 8.5 - 10.5 mg/dL    Protein, Total 6.8 6.0 - 8.0 g/dL    Albumin 3.7 3.5 - 5.0 g/dL    Alkaline Phosphatase 83 45 - 120 U/L    AST 15 0 - 40 U/L    ALT 24 0 - 45 U/L    Narrative    Fasting Glucose reference range is 70-99 mg/dL per  American Diabetes Association (ADA) guidelines.   Lipid [place] FASTING   Result Value Ref Range    Cholesterol 191 <=199 mg/dL     Triglycerides 134 <=149 mg/dL    HDL Cholesterol 55 >=40 mg/dL    LDL Calculated 109 <=129 mg/dL    Patient Fasting > 8hrs? Yes    Hepatitis C Antibody (Anti-HCV)   Result Value Ref Range    Hepatitis C Ab Negative Negative       Your urine test shows that your kidneys are not working quite as well as they should.  If we can keep good control of your blood pressure and blood sugars, this can improve.  Your choelsterol is normal.  Your blood sugars are better than last time, but still a little high.  You can discuss this with the diabetic nurse at your upcoming appointment.  Your Hepatitis C test is negative.    Please call with questions or contact us using [email or web address].    Sincerely,        Electronically signed by Ashley Narvaez PA-C

## 2021-06-25 NOTE — TELEPHONE ENCOUNTER
Please call pt to see how his new diabetes medicine (Actos) is doing.  Any problems?    He should come in for lab-only at end of June to re-check his sugars.

## 2021-06-27 PROBLEM — R47.01 APHASIA: Status: ACTIVE | Noted: 2021-01-01

## 2021-06-27 PROBLEM — Z20.822 LAB TEST NEGATIVE FOR COVID-19 VIRUS: Status: ACTIVE | Noted: 2021-01-01

## 2021-06-27 PROBLEM — Z87.898 HISTORY OF SEIZURE: Status: ACTIVE | Noted: 2021-01-01

## 2021-06-27 PROBLEM — R56.9 SEIZURE (H): Status: ACTIVE | Noted: 2021-01-01

## 2021-06-27 NOTE — CONSULTS
[facility]    Stroke Telephone Note    I was called by Juan Ojeda on 06/27/21 regarding patient Jorge Young. The patient is a 66 year old male with history of R MCA stroke and seizure disorder secondary to stroke not currently on any anti-seizure drugs who presents as a stroke code for transient confusion. He was reportedly normal per his son when he was picked up to go to a party, then around 1530 he was not able to communicate effectively, needed instructions, and disoriented. He was still symptomatic to some degree upon arrival to the ED, but currently he is reportedly back to his neurologic baseline.    Stroke Code Data (for stroke code without tele)  Stroke code activated 06/27/21   1812   Stroke provider first response  06/27/21   1814            Last known normal 06/27/21   1500        Time of discovery   (or onset of symptoms) 06/27/21   1530   Head CT read by Stroke Neuro Dr/Provider 06/27/21   1830   Was stroke code de-escalated? Yes 06/27/21 1858  symptoms not likely caused by stroke       Thrombolytic Treatment   Not given due to minor/isolated/quickly resolving symptoms.    Endovascular Treatment  Not initiated due to absence of proximal vessel occlusion    Impression  1. Transient altered awareness- cannot rule out transient cerebral ischemia, however with his history of seizure and choice to not use AEDs, this seems more suggestive of seizure    Recommendations   MRI brain w/ and w/o contrast  General neurology consultation for seizure evaluation      My recommendations are based on the information provided over the phone by Jorge Young's in-person providers. They are not intended to replace the clinical judgment of his in-person providers. I was not requested to personally see or examine the patient at this time.    The Stroke Staff is Dr. Carrero.    Mary Oleary MD  Vascular Neurology Fellow  To page me or covering stroke neurology team member,  "click here: [facility]   Choose \"On Call\" tab at top, then search dropdown box for \"Neurology Adult\", select location, press Enter, then look for stroke/neuro ICU/telestroke.  "
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
## 2021-06-27 NOTE — ED NOTES
Last known well time 1500 today. Patient was with his family when they noted that he was not acting quite himself. Patient has a stroke hx from 2 years. Patient arrives to the room and has difficulty following commands. He is able to ambulate from wheelchair to cot with simple very simple commands. Slow response. Answers all questions appropriately with slightly delayed response. No gross neuro deficits.  MD at bedside for stroke eval. Pt declared to be a tier 2 Code stroke. Pt brought to CT via stroke protocol. Patient continues to require short simple commands, but is alert and oriented. Forgetful, required frequent reminders to stay still on the CT table.  No gross motor neuro deficits on return to room.

## 2021-06-28 PROBLEM — Z86.73 HISTORY OF EMBOLIC STROKE: Status: ACTIVE | Noted: 2021-01-01

## 2021-06-28 PROBLEM — Z11.52 ENCOUNTER FOR SCREENING LABORATORY TESTING FOR SEVERE ACUTE RESPIRATORY SYNDROME CORONAVIRUS 2 (SARS-COV-2): Status: ACTIVE | Noted: 2021-01-01

## 2021-06-28 PROBLEM — G93.40 ACUTE ENCEPHALOPATHY: Status: ACTIVE | Noted: 2021-01-01

## 2021-06-28 NOTE — ED NOTES
Patient's daughter had concerns about the patient being shaky and trying to pull his gown and blood pressure cuff off. MD is informed.

## 2021-06-28 NOTE — PLAN OF CARE
"Patient sleepy, arouses to vigorous rubbing of chest.  Opened eyes.  Was able to state name, month and day of birth; but not his birth year.  Could state month; but not the current year.  Stated \"hospital\" when asked where he was.  Patient is slow to respond to commands.  Has equal grasps in bilateral hands, good strength in bilateral LE's; but needs verbal cueing to perform neuro checks.  Patient has some clear speech and then some words are incomprehensible.  Updated Dr. Garcia.  "

## 2021-06-28 NOTE — CONSULTS
"[facility]    Stroke Telephone Note    I was called by Padilla Lang Md on 06/28/21 regarding patient Jorge Young. The patient is a 66 year old male who had R MCA stroke last year and is now admitted after an episode of confusion concerning for a seizure. This patient had stoke code activation yesterday when he first presented with confusion and the imaging did not show any acute pathology. He was started on keppra. Today, he had an episode of restlessness followed by 30 seconds of being completely unresponsive, then being groggy and slowly responsive.         Impression    Suspect subclinical seizures causing alteration of mental state.     Recommendations   -repeat head CT without contrast to rule out intracranial hemorrhage and show if there are any strokes that did not show on the imaging yesterday.   -give 1 mg ativan IV now in case he is still having subclinical seizures.   -give 1000 mg keppra now then increase maintenance dose to 1000 bid.   -consult general neurology.   -may benefit from transfer to a hospital with EEG and general neurology coverage.     My recommendations are based on the information provided over the phone by Jorge Young's in-person providers. They are not intended to replace the clinical judgment of his in-person providers. I was not requested to personally see or examine the patient at this time.      Gino Barber MD, Msc, KAYLA, MILADN   of Neurology  [facility]     06/28/2021 2:37 PM  To page me or covering stroke neurology team member, click here: [facility]  Choose \"On Call\" tab at top, then search dropdown box for \"Neurology Adult\" & press Enter, look for Neuro ICU/Stroke    "

## 2021-06-28 NOTE — PROGRESS NOTES
"S:  Called to rapid response for patient in 2310 for patient being non-responsive.  Patient had apparently complained about some leg pain and being restless.  Nursing arrived and patient was restless and moving about by report but not complaining of anything in particular.  Then said had to go to the bathroom and got up to the commode.  No bowel movement or voiding, but while on the commode had a non-responsive episode where he was staring and not responding at all, then started to \"come out of it\" and nursing was able to get him back to the bed.  In bed he started to respond again but was sleepy.  I arrived at this point and patient was initially just opening eyes and somewhat mumbling yes with sternal rub or gentle shaking.  He slowly woke up more and more and after about 10 minutes was responding to questions by voice by opening eyes and saying yes or no.  Then started with some simple sentences and started following commands including moving all extremities, although started with left before right.    Hard to do detailed neuro exam as patient wasn't compliant enough, but no clear focal deficits at this point.    On recheck about 20 minutes later, patient was still sleepy, had to shake a bit to awaken and get to answer yes or no.  Denied any pain or dyspnea throughout this episode.  No loss of bladder or bowel control.    Blood pressure, heart rate and sats on room air remained stable throughout episode.    Glucose was checked immediately after initial non-responsive episode and was 105 per report.          ASSESSMENT/PLAN:   Spell - suspect seizure  Episode most consistent with a seizure with post-ictal grogginess afterwards, although unusual that he seemed to improve for a bit before worsening again.  No focal deficits as above.  Discussed with stroke neuro who agreed with plan and that this sounded most like seizure.  CBC, CMP, ammonia, VBG, lacitc, troponin and d-dimer normal.   keppra level, TSH with reflex, " B12, folate, methylmalonic acid pending   Chest x-ray negative.  CT head negative.  As per their recommendations giving 1 mg ativan now although I suspect he is currently more likely post-ictal than having ongoing seizure activity.  Give additional Keppra 1000 mg now and increase dosage to 1000 twice daily.  Have given ativan x 3mg total, benadryl 50 IV, and zyprexa 5mg - resting comfortably finally after this.  Unable to get EEG here until Thurs at the earliest.   Agree with recommendations from both stroke neurology and general neurology for transfer for neurology consultation and likely inpatient spot EEG.  No appropriate beds available anywhere in the [facility] system.  Transferring to [facility] - as always, appreciate their helpfulness and efficiency.  Family aware and in agreement.      Hypertension   Blood pressure mildly up - had single charted as 190 systolic, but sounds like this was when he was agitated and not holding still, recheck shortly thereafter was 160 systolic.  Didn't get hydrochlorothiazide today - giving now if able.      Total time spent: 120 minutes.      Padilla Lang MD

## 2021-06-28 NOTE — ED NOTES
Patient up to bathroom stby assist. Patient continues to require short, simple commands. He admits that he does no feel quite like himself still. ambulated in the hallway without difficulty

## 2021-06-28 NOTE — CONFIDENTIAL NOTE
Dr. Ojeda from [facility] called tonight about this patient who is presenting with spell of altered consciousness now with amnesia for the event. Patient has history of R MCA infarct. This is concerning for seizure, and he had a similar presentation in Oct 2020 for which he was hospitalized at [facility]. He had brain MRI and EEG at that time. MRI revealed known area of stroke, EEG with slowing over right hemisphere, recommended starting AED but patient declined.     Now, treating physician feels patient is clearing but not back at baseline and may need EEG. 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 He should be admitted locally and monitored overnight. If he is still altered in the morning then I think transfer to hospital with EEG is reasonable. He had a head CT today that did not reveal any new stroke. I do not think we need repeat MRI at this time.    Plan was discussed with Dr. Ojeda who agrees.     Marce Gordon DO  Neurology

## 2021-06-28 NOTE — DISCHARGE SUMMARY
[facility] Discharge Summary    Jorge Young MRN# [id number]   Age: 66 year old YOB: 1955     Date of Admission:  6/27/2021  Date of Discharge::  6/28/2021  Admitting Physician:  Kelvin Garcia MD  Discharge Physician:  Padilla Lang MD, MD             Admission Diagnoses:   History of embolic stroke [Z86.73]  Acute encephalopathy [G93.40]  History of seizure [Z87.898]  Encounter for screening laboratory testing for severe acute respiratory syndrome coronavirus 2 (SARS-CoV-2) [Z20.822]          Principle Discharge Diagnosis:       Spells    See hospital course for further active diagnoses addressed during this admission.            Procedures:   6/27 CTAhead  IMPRESSION:  1. Mild atherosclerotic narrowing (less than 50% luminal diameter  stenosis) at the origins of the internal carotid arteries on both  sides.  2. Otherwise, normal neck and head CTA.        Radiation dose for this scan was reduced using automated exposure  control, adjustment of the mA and/or kV according to patient size, or  iterative reconstruction technique     6/27 CT head without:  IMPRESSION:  1.  No CT evidence of acute intracranial pathology.  2.  Old small right frontal lobe cortical and subcortical infarct with encephalomalacia and gliosis.  3.  Moderate chronic small vessel disease with patchy and confluent white matter hypoattenuation.  4.  Mild age-related atrophy.    Repeat CT 6/28 results pending         Medications Prior to Admission:     Medications Prior to Admission   Medication Sig Dispense Refill Last Dose     aspirin (ASA) 81 MG EC tablet Take 81 mg by mouth daily    Past Week at Unknown time     atorvastatin (LIPITOR) 80 MG tablet Take 80 mg by mouth daily    Past Week at Unknown time     glipiZIDE (GLUCOTROL XL) 10 MG 24 hr tablet Take 10 mg by mouth daily    Past Week at Unknown time     losartan-hydrochlorothiazide (HYZAAR) 50-12.5 MG tablet Take 1 tablet by mouth daily    Past Week at Unknown  time     sertraline (ZOLOFT) 25 MG tablet Take 50 mg by mouth daily    Past Week at Unknown time     pioglitazone (ACTOS) 15 MG tablet Take 15 mg by mouth daily   More than a month at Unknown time             Current Medications:     Current Facility-Administered Medications   Medication     acetaminophen (TYLENOL) Suppository 650 mg     acetaminophen (TYLENOL) tablet 650 mg     aspirin EC tablet 81 mg     atorvastatin (LIPITOR) tablet 80 mg     glucose gel 15-30 g    Or     dextrose 50 % injection 25-50 mL    Or     glucagon injection 1 mg     glipiZIDE (GLUCOTROL XL) 24 hr tablet 10 mg     [START ON 6/29/2021] hydrochlorothiazide (MICROZIDE) capsule 12.5 mg    And     [START ON 6/29/2021] losartan (COZAAR) tablet 50 mg     hydrochlorothiazide (MICROZIDE) capsule 12.5 mg     insulin aspart (NovoLOG) injection (RAPID ACTING)     insulin aspart (NovoLOG) injection (RAPID ACTING)     levETIRAcetam (KEPPRA) tablet 1,000 mg     lidocaine (LMX4) kit     lidocaine 1 % 0.1-1 mL     melatonin tablet 1 mg     nitroGLYcerin (NITROSTAT) sublingual tablet 0.4 mg     ondansetron (ZOFRAN-ODT) ODT tab 4 mg    Or     ondansetron (ZOFRAN) injection 4 mg     pioglitazone (ACTOS) tablet 15 mg     sertraline (ZOLOFT) tablet 50 mg     sodium chloride (PF) 0.9% PF flush 3 mL     sodium chloride (PF) 0.9% PF flush 3 mL                  Brief History of Illness:     From Admission H+P:   Jorge Young is a 66 year old male right hand dominant who presents with concern about confusion.  Patient has multiple medical diagnoses including history of hypertension, uncontrolled type 2 diabetes, prior history of embolic stroke and seizure.  Patient arrived by car with his son Les from his daughter's home in [place] where he had arrived for a get together.  Les reports patient lives alone in [facility].  Les reports he last saw his dad 2 days prior.  Juan Pablo picked his father up earlier this afternoon around 3 PM and they  "were driving up to denies any and he was awake alert communicating properly.    While at his sister's home in [place] he seemedconfused and would not follow commands and needed to be redirected.  He would not eat much.  Initially thought it was because he was hypoglycemic.  Patient was given a brownie and lemonade and seemed to improve.  He continued to seem disoriented and was ultimately brought into the department due to concern for stroke with prior history of stroke treated at a hospital in [place] about 2 years prior by report.  Patient was seen upon arrival with a stroke evaluation called from triage.  Rapid assessment-patient on arrival reports no headache speech is normal no facial asymmetry no extremity weakness appreciated he seems to wander and look around the room history was disoriented. No fall.  He did not take his prescribed medications today.               TODAY:     This AM :  \"Better\".  He no longer feels confused.  He denies any visual difficulties.  Denies subjective difficulty swallowing.  He denies any focal weakness in any extremity.  Denies any areas of numbness.  Denies dyspnea, cough, or respiratory chest pain.  Denies nausea or vomiting.     Then early afternoon:  Called to rapid response for patient in 2310 for patient being non-responsive.  Patient had apparently complained about some leg pain and being restless.  Nursing arrived and patient was restless and moving about by report but not complaining of anything in particular.  Then said had to go to the bathroom and got up to the commode.  No bowel movement or voiding, but while on the commode had a non-responsive episode where he was staring and not responding at all, then started to \"come out of it\" and nursing was able to get him back to the bed.  In bed he started to respond again but was sleepy.  I arrived at this point and patient was initially just opening eyes and somewhat mumbling yes with sternal rub or gentle shaking.  He " slowly woke up more and more and after about 10 minutes was responding to questions by voice by opening eyes and saying yes or no.  Then started with some simple sentences and started following commands including moving all extremities, although started with left before right.    Hard to do detailed neuro exam as patient wasn't compliant enough, but no clear focal deficits at this point.    On recheck about 20 minutes later, patient was still sleepy, had to shake a bit to awaken and get to answer yes or no.  Denied any pain or dyspnea throughout this episode.  No loss of bladder or bowel control.    Blood pressure, heart rate and sats on room air remained stable throughout episode.    Glucose was checked immediately after initial non-responsive episode and was 105 per report.              Hospital Course:     Spell this afternoon - see below for discussion from this AM  - suspect seizure vs encephalopathy of uncertain etiology   Episode most consistent with a seizure with post-ictal grogginess afterwards, although unusual that he seemed to improve for a bit before worsening again.  No focal deficits as above.  Discussed with stroke neuro who agreed with plan and that this sounded most like seizure.  CBC, CMP, ammonia, VBG, lacitc, troponin and d-dimer normal.   keppra level, TSH with reflex, B12, folate, methylmalonic acid pending.  UA, urine drug screen and heavy metal screen ordered but not yet collected.   Chest x-ray today negative.   Head CT today neg.  These should all be done prior to transfer, but recommend confirming that they are in fact in process.   As per their recommendations gave additional Keppra 1000 mg now and increase dosage to 1000 twice daily.  Have given ativan x 3mg total, benadryl 50 IV, and zyprexa 5mg - resting comfortably finally after this.  Unable to get EEG here until Thurs at the earliest.   Agree with recommendations from both stroke neurology and general neurology for transfer for  neurology consultation and likely inpatient spot EEG.  No appropriate beds available anywhere in the [facility] system.  Transferring to [facility] - as always, appreciate their helpfulness and efficiency.  Family aware and in agreement.           Seizure disorder as sequela of cerebrovascular accident (H)  Postictal confusion  History of R MCA stroke 7/2020  Patient was brought to the emergency department for concern of stroke.  Tier-2 Code Stroke activation was initiated, then subsequently deescalated at the advice of Stroke Neurology.  Telephone consultation with Dr. Gordon, Neurology, was obtained.  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  Recommendations were for levetiracetam 2 g IV load, given in ED, followed by maintenance of levetiracetam 750 mg twice daily.  Head CT was reviewed by neurology, which did not reveal new stroke.  Neurology did not feel that a repeat MRI was necessary.     I examined the patient this morning with his daughter at bedside.  His daughter notes marked improvement in confusion as compared to yesterday.  In fact, she was present for a bedside test of orientation done by the patient's RN, which showed mild abnormalities.  For instance, he would offer the month and date of his birth, but not the year.  His word finding problems have resolved.  Verbal responses to my questions were accurate and complete.  Motor and sensory neurologic exams are normal.     -I discussed the concept of post stroke epilepsy with the patient and his daughter.  I explained that this epilepsy can be a subtle finding, not necessarily a clonic/tonic seizure, and that there can be some confusion postictal.  I described the neurology recommendations for levetiracetam treatment.  Both agree to this plan.  -Because of the residual defects on bedside cognitive testing, I suggested that we keep the patient in the hospital today and tonight for  continued observation.  If he is neurologically back to his normal tomorrow, he can be discharged then.  -I will plan on discharging him on levetiracetam 750 mg twice daily.  -The patient was advised to follow-up with outpatient neurology following a 10/2020 hospitalization for presumed seizure disorder.  The patient elected not to start anticonvulsive therapy at that time, and elected not to follow-up with neurology.  Therefore, a new neurology appointment will need to be made at discharge.      Aphasia  Both the patient and his daughter described that he has some occasional word finding problems, and has since his 7/2020 CVA.  No expressive aphasia was noted on today's exam.  -This is probably a chronic problem, and does not require additional work-up at this time.     Type 2 diabetes mellitus (H)  Managed outpatient with glipizide 10 mg daily, continued here.  Pharmacy left a note that the patient was prescribed pioglitazone 15 mg daily on 4/19/2021.  The patient only picked up the first month of that medication course.  Most recent hemoglobin A1c was 10.3 on 6/27/2021.  He has been consistently hyperglycemic since admission here.  -Continue glipizide 10 mg daily.  -Restart pioglitazone 15 mg daily, and will encourage the patient to resume use upon discharge.  -Begin medium resistance NovoLog sliding scale based upon glucose checks before meals and at bedtime.  -We will advise post hospital follow-up with PCP.     HTN (hypertension)  Managed prior to admission with losartan/HCTZ 50/12.5 mg once daily.  He has been mildly hypertensive here, was determined that hydrochlorothiazide was missed by pharmacy.       Hyperlipidemia  Managed prior to admission with atorvastatin 80 mg daily.    -Continue atorvastatin.       Lab test negative for COVID-19 virus  This patient was evaluated during a global COVID-19 pandemic, which necessitated consideration that the patient might be at risk for infection with the SARS-CoV-2  virus that causes COVID-19.  COVID-19 PCR was negative on 6/27/2021.  Patient fully vaccinated, last shot 6/17/21      DVT Prophylaxis: Low Risk/Ambulatory with no VTE prophylaxis indicated  Code Status: Full Code                Discharge Disposition:     Transferred to [facility]      Attestation:  I have reviewed today's vital signs, notes, medications, labs and imaging.  Amount of time performed on this discharge summary: 30 additional  minutes.    Padilla Lang MD, MD

## 2021-06-28 NOTE — PLAN OF CARE
Patient alert and oriented, but can be forgetful. Answers all questions appropriately. Patient has trouble following instructions. Has trouble following along during neuro checks. Have to repeat instructions multiple times, otherwise neuros are intact. Patient impulsive and does not use call light. Bed alarms on. Patient had difficulty sleeping for awhile overnight and was restless. Given tylenol x1 for a headache.     Assist of 1 with gait belt. On tele. Saline locked. Seizure pads on bed.

## 2021-06-28 NOTE — PROGRESS NOTES
Admitting nurse completed full skin assessment. Doyle score and doyle interventions. This writer agrees with initial skin assessment findings.
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
## 2021-06-28 NOTE — PROGRESS NOTES
"Patient has  [employer] to Observation  order. Patient has been given the Observation brochure -  What does Observation mean to me.\"  Patient has been given the opportunity to ask questions about observation status and their plan of care.      Fantasma Arenas RN    "

## 2021-06-28 NOTE — ED NOTES
Patient resting with family at bedside. All questions were answered. Patient given a sandwich, chips and water.

## 2021-06-28 NOTE — PROGRESS NOTES
Entered patient's room as daughter was at desk to report that he needed something for leg pain. Patient was alert, but extremely restless and moving around in bed. Initial thought that he was having extreme pain, then he said he needed to use the bathroom. Obtained assistance from NST as he was standing and flopping back down into the bed. Sat on commode and immediately became pale, diaphoretic and said he was dizzy. Started staring off into space. Intermittently responding to verbal stimuli, but inconsistent. Called a RRT. Assisted back to bed with 3 staff. /69 HEART RATE-66 100% on ROOM AIR. BG-103. Starting to respond more. Tressa Guzman and Rickey in room to assess. Daughter here. Sinus rhythm on tele. EKG being done. Dr. Lang spoke with daughter and updated on status. Will monitor.

## 2021-06-28 NOTE — PROGRESS NOTES
"[facility] [place]    Patient admitted to room 2310 at approximately 2150 via cart from emergency room. Patient was accompanied by transport tech.     Verbal SBAR report received from [place] prior to patient arrival.     Patient ambulated to bed with stand-by assist. Patient alert and oriented X 4. The patient is not having any pain. Admission vital signs: Blood pressure (!) 150/73, pulse 89, temperature 97.6  F (36.4  C), temperature source Oral, resp. rate 14, height 1.753 m (5' 9\"), weight 88.6 kg (195 lb 5.2 oz), SpO2 97 %. Patient was oriented to plan of care, call light, bed controls, tv, telephone, bathroom and visiting hours.     Risk Assessment    The following safety risks were identified during admission: fall and seizure. Yellow risk band applied: YES.     Skin Initial Assessment    This writer admitted this patient and completed a full skin assessment and Doyle score in the Adult PCS flowsheet. Appropriate interventions initiated as needed.     Secondary skin check completed by Marley Kwon Risk Assessment  Sensory Perception: 4-->no impairment  Moisture: 4-->rarely moist  Activity: 3-->walks occasionally  Mobility: 3-->slightly limited  Nutrition: 3-->adequate  Friction and Shear: 3-->no apparent problem  Doyle Score: 20  Mattress: Standard Hospital Mattress (Foam)  Bed Frame: Standard width and length    Education    Patient has a [place] to Observation order: Yes  Observation education completed and documented: Yes      Fantasma Arenas RN    "

## 2021-06-28 NOTE — PROGRESS NOTES
[facility] Medicine Progress Note  Date of Service: 06/28/2021    Assessment & Plan   Jorge Young is a 66 year old male who presented on 6/27/2021 with confusion.    Principal Problem:  Seizure disorder as sequela of cerebrovascular accident (H)  Postictal confusion  History of R MCA stroke 7/2020  Patient was brought to the emergency department for concern of stroke.  Tier-2 Code Stroke activation was initiated, then subsequently deescalated at the advice of Stroke Neurology.  Telephone consultation with Dr. Gordon, Neurology, was obtained.  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  Recommendations were for levetiracetam 2 g IV load, given in ED, followed by maintenance of levetiracetam 750 mg twice daily.  Head CT was reviewed by neurology, which did not reveal new stroke.  Neurology did not feel that a repeat MRI was necessary.    I examined the patient this morning with his daughter at bedside.  His daughter notes marked improvement in confusion as compared to yesterday.  In fact, she was present for a bedside test of orientation done by the patient's RN, which showed mild abnormalities.  For instance, he would offer the month and date of his birth, but not the year.  His word finding problems have resolved.  Verbal responses to my questions were accurate and complete.  Motor and sensory neurologic exams are normal.    -I discussed the concept of post stroke epilepsy with the patient and his daughter.  I explained that this epilepsy can be a subtle finding, not necessarily a clonic/tonic seizure, and that there can be some confusion postictal.  I described the neurology recommendations for levetiracetam treatment.  Both agree to this plan.  -Because of the residual defects on bedside cognitive testing, I suggested that we keep the patient in the hospital today and tonight for continued observation.   If he is neurologically back to his normal tomorrow, he can be discharged then.  -I will plan on discharging him on levetiracetam 750 mg twice daily.  -The patient was advised to follow-up with outpatient neurology following a 10/2020 hospitalization for presumed seizure disorder.  The patient elected not to start anticonvulsive therapy at that time, and elected not to follow-up with neurology.  Therefore, a new neurology appointment will need to be made at discharge.      Aphasia  Both the patient and his daughter described that he has some occasional word finding problems, and has since his 7/2020 CVA.  No expressive aphasia was noted on today's exam.  -This is probably a chronic problem, and does not require additional work-up at this time.     Type 2 diabetes mellitus (H)  Managed outpatient with glipizide 10 mg daily, continued here.  Pharmacy left a note that the patient was prescribed pioglitazone 15 mg daily on 4/19/2021.  The patient only picked up the first month of that medication course.  Most recent hemoglobin A1c was 10.3 on 6/27/2021.  He has been consistently hyperglycemic since admission here.  -Continue glipizide 10 mg daily.  -Restart pioglitazone 15 mg daily, and will encourage the patient to resume use upon discharge.  -Begin medium resistance NovoLog sliding scale based upon glucose checks before meals and at bedtime.  -We will advise post hospital follow-up with PCP.    HTN (hypertension)  Managed prior to admission with losartan/HCTZ 50/12.5 mg once daily, continued here.  He has been mildly hypertensive here, and trending toward improvement.  -Continue preadmission losartan/HCTZ.     Hyperlipidemia  Managed prior to admission with atorvastatin 80 mg daily.    -Continue atorvastatin.       Lab test negative for COVID-19 virus  This patient was evaluated during a global COVID-19 pandemic, which necessitated consideration that the patient might be at risk for infection with the SARS-CoV-2 virus  "that causes COVID-19.  COVID-19 PCR was negative on 6/27/2021.         DVT Prophylaxis: Low Risk/Ambulatory with no VTE prophylaxis indicated  Code Status: Full Code    Diet: Diabetic.  Lines: Peripheral.   Mora catheter: None.  Restraints: None.    Discussion: Significant neurologic improvement from yesterday following initiation of levetiracetam therapy.  If he remains neurologically stable and near his baseline by tomorrow morning, he can probably go home then.    Disposition: Anticipate discharge tomorrow morning.     Attestation:  I have reviewed today's vital signs, notes, medications, labs and imaging.  Amount of time performed on this hospital visit: 40 minutes.    Kelvin Garcia MD   [facility] Medicine      Interval History   \"Better\".  He no longer feels confused.  He denies any visual difficulties.  Denies subjective difficulty swallowing.  He denies any focal weakness in any extremity.  Denies any areas of numbness.  Denies dyspnea, cough, or respiratory chest pain.  Denies nausea or vomiting.    Physical Exam   Temp:  [97.6  F (36.4  C)-97.9  F (36.6  C)] 97.9  F (36.6  C)  Pulse:  [77-89] 77  Resp:  [9-20] 18  BP: (150-182)/() 167/81  SpO2:  [82 %-99 %] 97 %    Weights:   Vitals:    06/27/21 1832 06/27/21 2155   Weight: 90.7 kg (200 lb) 88.6 kg (195 lb 5.2 oz)    Body mass index is 28.84 kg/m .    GENERAL: Pleasant man, who was sleeping when I entered the room.  He awoke to my voice, and remained alert throughout the remainder of my visit.  He appears comfortable.  EYES: Eyes grossly normal to inspection, extraocular movements intact  HENT: Nares patent bilaterally, no discharge.   NECK: Trachea midline, no stridor.  RESP: No accessory muscle use.  Lungs clear throughout on inspiration and expiration.  Expiration not prolonged, no wheeze.  CV: Regular rate and rhythm, non-tachycardic.  Normal S1 S2, no murmur or extra sound.  No lower extremity edema.  ABDOMEN: Soft, non-tender, no guarding. "  Liver and spleen not enlarged, no masses palpable.  Bowel sounds positive.  MS: No bony deformities noted.  No red or inflamed joints.  SKIN: Warm and dry, no rashes.  NEURO: Alert, conversant, appropriate in conversation, no word finding difficulties noted.  Cranial nerves III - XII are intact.  Motor function is intact.  No sensory deficits noted.  Tone is normal.  No abnormal movements are seen.  PSYCH: Calm, alert, conversant.  Able to articulate logical thoughts, no tangential thoughts, no hallucinations or delusions.  Affect normal.        Data   Recent Labs   Lab 06/28/21  0519 06/27/21  1812   WBC 11.0 7.0   HGB 14.6 14.8   MCV 83 83    209   INR  --  1.02    134   POTASSIUM 4.1 3.6   CHLORIDE 104 102   CO2 24 26   BUN 14 18   CR 0.83 0.99   ANIONGAP 6 6   GLENN 8.5 8.8   * 257*   ALBUMIN 3.3*  --    PROTTOTAL 7.0  --    BILITOTAL 0.5  --    ALKPHOS 77  --    ALT 21  --    AST 17  --    TROPI  --  <0.015       Recent Labs   Lab 06/28/21  0755 06/28/21  0519 06/28/21  0302 06/27/21  1812 06/27/21  1804   GLC  --  267*  --  257*  --    *  --  255*  --  247*        Unresulted Labs Ordered in the Past 30 Days of this Admission     No orders found for last 31 day(s).           Imaging  Recent Results (from the past 24 hour(s))   CT Head w/o Contrast    Narrative    EXAM: CT HEAD W/O CONTRAST  LOCATION: [facility]  DATE/TIME: 6/27/2021 6:22 PM    INDICATION: Mental status change, confusion.  COMPARISON: Brain MRI 7/4/2020.  TECHNIQUE: Routine CT Head without IV contrast. Multiplanar reformats. Dose reduction techniques were used.    FINDINGS:  INTRACRANIAL CONTENTS: No acute intracranial hemorrhage, extraaxial collection, or mass effect. Right frontal cortical and subcortical infarct with encephalomalacia/gliosis consistent with expected evolutionary change. No CT evidence of acute infarct. No   hyperdense vessel sign convincingly identified. Moderate presumed chronic small  vessel ischemic changes. Mild generalized volume loss. No hydrocephalus.     VISUALIZED ORBITS/SINUSES/MASTOIDS: No acute intraorbital abnormality. No paranasal sinus mucosal disease. No middle ear or mastoid effusion.    BONES/SOFT TISSUES: No acute abnormality.      Impression    IMPRESSION:  1.  No CT evidence of acute intracranial pathology.  2.  Old small right frontal lobe cortical and subcortical infarct with encephalomalacia and gliosis.  3.  Moderate chronic small vessel disease with patchy and confluent white matter hypoattenuation.  4.  Mild age-related atrophy.    Results called to Dr. Ojeda at 1841 hours on 6/27/2021.   CTA Head Neck with Contrast    Narrative    CT ANGIOGRAM OF THE HEAD AND NECK WITHOUT AND WITH CONTRAST  6/27/2021  6:37 PM     COMPARISON: None    HISTORY: Stroke/transient ischemic attack, assess intracranial  arteries. Code stroke to evaluate for potential thrombolysis and  thrombectomy.       TECHNIQUE:  Precontrast localizing scans were followed by CT  angiography with an injection of 70mL Isovue-370 nonionic intravenous  contrast material with scans through the head and neck.  Images were  transferred to a separate 3-D workstation where multiplanar  reformations and 3-D images were created.  Estimates of carotid  stenoses are made relative to the distal internal carotid artery  diameters except as noted.      FINDINGS:   Neck CTA: The common carotid arteries bilaterally are patent without  vascular narrowing. There is calcified and noncalcified plaque at the  origins of the internal carotid arteries on both sides that result in  mild narrowing (less than 50% luminal diameter stenosis) bilaterally.  The cervical internal carotid arteries bilaterally are otherwise  patent and unremarkable. Vertebral arteries bilaterally are patent and  unremarkable.    Head CTA: The basilar, bilateral intracranial distal internal carotid,  bilateral anterior cerebral, bilateral middle cerebral and  bilateral  posterior cerebral arteries are patent and unremarkable. The anterior  communicating artery is patent.      Impression    IMPRESSION:  1. Mild atherosclerotic narrowing (less than 50% luminal diameter  stenosis) at the origins of the internal carotid arteries on both  sides.  2. Otherwise, normal neck and head CTA.      Radiation dose for this scan was reduced using automated exposure  control, adjustment of the mA and/or kV according to patient size, or  iterative reconstruction technique    ANSON SONG MD        I reviewed all new labs and imaging results over the last 24 hours. I personally reviewed no images or EKG's today.    Medications       aspirin  81 mg Oral Daily     atorvastatin  80 mg Oral Daily     glipiZIDE  10 mg Oral Daily with breakfast     losartan  50 mg Oral Daily    Or     hydrochlorothiazide  12.5 mg Oral Daily     levETIRAcetam  750 mg Oral BID     sertraline  50 mg Oral Daily       Kelvin Garcia MD    [facility]

## 2021-06-28 NOTE — PLAN OF CARE
"Patient is somnolent and arouses to vigorous stimulation and to voice.  Answers \"yep\" or \"good\" when asked if he knows where he is or how he is doing.  Patient very restless, agitated intermittently.  Given ativan 1 mg IV at 1506 and then was transported via cart to CT department.  Call from CT department and report of patient being agitated.  Spoke with Dr. Lang; medicated with another 1 mg ativan IV at 1538 in CT department.  Patient continued to be restless and agitated.  CT unable to be performed at that time.  Patient brought back to room via cart and assisted of 4 back into bed with slip sheet.  Patient restless, agitated, moving/rolling over in bed, trying to put legs over side of the bed. Patient given a 3rd dose of ativan 1 mg IV at 1643. Patient's daughter Salena and son Les are at bedside.  Dr. Lang here to see patient. Vitals /94, pulse 77, RR 18, temperature 97.0 axillary and oxygen saturation 96 % room air.  Bed alarm is on.  Seizure pads on bed rails.  "

## 2021-06-29 NOTE — PLAN OF CARE
Took over patient's care at 1700.  Patient continues to be restless/agitated and attempting to get up out of bed.  Difficult to redirect.  Goal was to get patient to CT this evening.  Patient very confused - unable to answer most questions/did not follow directions well.  PRN Zyprexa given per MD's orders.  Patient did relax enough to get CT and x-ray completed.  Patient to transfer to [facility] this evening.  Family continues to be at bedside.  Di Perez RN 8:56 PM 6/28/2021

## 2021-06-29 NOTE — PLAN OF CARE
Date: 6/28/2021    Time of Call:1670     Diagnosis:  Agitation      [ VORB ] Ordering provider: Dr. Guillaume  Order: Zyprexa 5 mg IM for 1 dose     Order received by: Di Perez RN

## 2021-06-29 NOTE — PLAN OF CARE
Transfer/Discharge Note  Data:   Reason for Transport:  Higher level of care for seizure like activity    Jorge Young was transferred to [facility]  via basic life support (BLS) at 2040.  Patient was accompanied by [facility]. Equipment used for transport: None. Family was aware of reason for transport: yes. All belongings sent with Patient.    Action:  Report: given to JUHI Roach who verbalized understanding of transfer.      Response:  Patient's condition when transferred was stable.    Di Perez RN
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
## 2021-07-03 NOTE — ADDENDUM NOTE
Addendum Note by Zara Ricardo MD at 10/10/2019  1:00 PM     Author: Zara Ricardo MD Service: -- Author Type: Physician    Filed: 10/11/2019  7:58 AM Encounter Date: 10/10/2019 Status: Signed    : Zara Ricardo MD (Physician)    Addended by: ZARA RICARDO on: 10/11/2019 07:58 AM        Modules accepted: Orders

## 2021-07-10 NOTE — TELEPHONE ENCOUNTER
Telephone Encounter by Ashley Narvaez PA-C at 7/10/2021  3:32 PM     Author: Ashley Narvaez PA-C Service: -- Author Type: Physician Assistant    Filed: 7/10/2021  3:33 PM Encounter Date: 7/10/2021 Status: Signed    : Ashley Narvaez PA-C (Physician Assistant)       He was just in the hospital.  We should see him for follow-up in clinic within the next week or so, if he doesn't already have follow-up scheduled with a specialist.

## 2021-07-13 NOTE — TELEPHONE ENCOUNTER
Attempted call to patient to help schedule an appointment. Unable to leave message due to mailbox is full.

## 2021-07-13 NOTE — TELEPHONE ENCOUNTER
He was just in the hospital.  We should see him for follow-up in clinic within the next week or so, if he doesn't already have follow-up scheduled with a specialist.

## 2021-07-13 NOTE — TELEPHONE ENCOUNTER
Telephone Encounter by Sherrie Silvestre CMA at 7/13/2021 10:48 AM     Author: Sherrie Silvestre CMA Service: -- Author Type: Certified Medical Assistant    Filed: 7/13/2021 10:49 AM Encounter Date: 7/10/2021 Status: Signed    : Sherrie Silvestre CMA (Certified Medical Assistant)       Please assist in scheduling. Started [facility] Epic encounter regarding this.

## 2021-08-12 PROBLEM — R29.898 LEFT HAND WEAKNESS: Status: ACTIVE | Noted: 2021-01-01

## 2021-08-12 PROBLEM — R20.0 LEFT FACIAL NUMBNESS: Status: ACTIVE | Noted: 2021-01-01

## 2021-08-12 NOTE — LETTER
August 24, 2021      Jorge Young  [address]  [place] [address]        Dear ,    We are writing to inform you of your test results.    Your seizure medicine is at a healthy level.  Kidney tests are normal.  Your blood sugars are too high.  I have put in a referral for you to meet with the diabetic educator to discuss his medications. They will call you to set up an appointment.      Resulted Orders   Basic metabolic panel   Result Value Ref Range    Sodium 135 (L) 136 - 145 mmol/L    Potassium 4.5 3.5 - 5.0 mmol/L    Chloride 99 98 - 107 mmol/L    Carbon Dioxide (CO2) 28 22 - 31 mmol/L    Anion Gap 8 5 - 18 mmol/L    Urea Nitrogen 17 8 - 22 mg/dL    Creatinine 1.15 0.70 - 1.30 mg/dL    Calcium 9.5 8.5 - 10.5 mg/dL    Glucose 378 (H) 70 - 125 mg/dL    GFR Estimate 66 >60 mL/min/1.73m2      Comment:      As of July 11, 2021, eGFR is calculated by the CKD-EPI creatinine equation, without race adjustment. eGFR can be influenced by muscle mass, exercise, and diet. The reported eGFR is an estimation only and is only applicable if the renal function is stable.   Keppra (Levetiracetam) Level   Result Value Ref Range    Levetiracetam 13.7 6.0 - 46.0 ug/mL      Comment:      Suggested Trough Concentrations: 6.0 - 46.0 ug/mL   Hemoglobin A1c   Result Value Ref Range    Hemoglobin A1C 11.7 (H) 0.0 - 5.6 %      Comment:      Normal <5.7%   Prediabetes 5.7-6.4%    Diabetes 6.5% or higher     Note: Adopted from ADA consensus guidelines.       If you have any questions or concerns, please call the clinic at the number listed above.       Sincerely,      Ashley Narvaez PA-C

## 2021-08-12 NOTE — Clinical Note
He was supposed to have hospital discharge follow up with [facility], he hasn't heard anything.  Can you call [facility] and have them contact him?

## 2021-08-12 NOTE — PROGRESS NOTES
Subjective:    Jorge Young is a 66 year old male who presents with chief complaint of hospital discharge follow-up.  He was admitted on 6/28/2021, discharged on 7/6/2021  He presented with post ictal confusion.  He has a history of stroke and possible seizures.  He was recently currently going undergoing a work-up for neuro/seizures.  Prior to ER visit he was acting differently and could not speak normally.  He was diagnosed with focal symptomatic epilepsy evaluated at the hospital.  He was started on Keppra.    Not driving right now.  Medications were adjusted slightly at the hospital.  He was supposed to have follow-up with [facility] but has not heard from them yet.    Today, he says he is feeling much better than prior to hospitalization.  He is getting ongoing treatments for retinopathy and macular edema.  He says he is actually going to cancel his next appointment for his eye shots.  He has been taking a mix of spices twice a day for the past 5 days.  He feels like in the past 5 days the spices of been working a lot better than the shots have not helped.  He is wondering if he can get the freestyle vincent meter.  He is not very good at checking his blood sugars at all.    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     Syncope, unspecified syncope type     Seizure (H)     History of seizure     Lab test negative for COVID-19 virus     Aphasia     History of embolic stroke     Acute encephalopathy     Encounter for screening laboratory testing for  severe acute respiratory syndrome coronavirus 2 (SARS-CoV-2)     Left facial numbness     Left hand weakness       Current Outpatient Medications:      aspirin (ASA) 81 MG EC tablet, Take 1 tablet (81 mg) by mouth daily, Disp: 100 tablet, Rfl: 3     atorvastatin (LIPITOR) 80 MG tablet, Take 1 tablet (80 mg) by mouth daily, Disp: 90 tablet, Rfl: 3     glipiZIDE (GLUCOTROL XL) 10 MG 24 hr tablet, Take 1 tablet (10 mg) by mouth daily, Disp: 90 tablet, Rfl: 3     hydrochlorothiazide (HYDRODIURIL) 50 MG tablet, Take 1 tablet (50 mg) by mouth daily, Disp: 90 tablet, Rfl: 3     losartan (COZAAR) 50 MG tablet, Take 1 tablet (50 mg) by mouth daily, Disp: 90 tablet, Rfl: 3     pioglitazone (ACTOS) 15 MG tablet, Take 1 tablet (15 mg) by mouth daily, Disp: 90 tablet, Rfl: 3      Objective:   Allergies:  Novocain [procaine]    Vitals:  Vitals:    08/12/21 1129   BP: (!) 142/84   BP Location: Right arm   Patient Position: Sitting   Cuff Size: Adult Regular   Pulse: 73   Temp: 98.4  F (36.9  C)   TempSrc: Oral   SpO2: 98%   Weight: 89.4 kg (197 lb)       Body mass index is 29.09 kg/m .    Vital signs reviewed.  General: Patient is alert and oriented x 3, in no apparent distress  Cardiac: regular rate and rhythm, no murmurs  Pulmonary: lungs clear to auscultation bilaterally, no crackles, rales, rhonchi, or wheezing noted  Musculoskeletal: Normal 4/5 strength in major muscle groups in upper and lower extremities bilaterally  Patient walks a normal tandem gait    Results for orders placed or performed in visit on 08/12/21   Hemoglobin A1c     Status: Abnormal   Result Value Ref Range    Hemoglobin A1C 11.7 (H) 0.0 - 5.6 %   Other labs pending.      Assessment and Plan:      1. Hospital discharge follow-up  2. Seizure (H)  Feeling much better.  Taking medication as directed.  Screening labs ordered today and I will follow-up with results.  Will get a message to [facility] to have him schedule a follow-up appointment.  - Keppra  (Levetiracetam) Level; Future  - Basic metabolic panel  - Hemoglobin A1c; Future  - Keppra (Levetiracetam) Level  - Hemoglobin A1c    3. Essential hypertension  Uncontrolled today, had not been taking all his medicines since hospital discharge.  Reviewed his meds today, refills sent.  - hydrochlorothiazide (HYDRODIURIL) 50 MG tablet; Take 1 tablet (50 mg) by mouth daily  Dispense: 90 tablet; Refill: 3  - losartan (COZAAR) 50 MG tablet; Take 1 tablet (50 mg) by mouth daily  Dispense: 90 tablet; Refill: 3    4. Acute embolic stroke (H)  Stable, meds refilled.  - aspirin (ASA) 81 MG EC tablet; Take 1 tablet (81 mg) by mouth daily  Dispense: 100 tablet; Refill: 3  - hydrochlorothiazide (HYDRODIURIL) 50 MG tablet; Take 1 tablet (50 mg) by mouth daily  Dispense: 90 tablet; Refill: 3  - losartan (COZAAR) 50 MG tablet; Take 1 tablet (50 mg) by mouth daily  Dispense: 90 tablet; Refill: 3    5. Uncontrolled type 2 diabetes mellitus with hyperglycemia (H)  A1c = 11.7%, about the same as last check on 6/29/21.  Continue present medications.  Can't tolerate metformin.  Victoza too expensive.  Referral placed to [facility] for follow-up and further medication management.  - glipiZIDE (GLUCOTROL XL) 10 MG 24 hr tablet; Take 1 tablet (10 mg) by mouth daily  Dispense: 90 tablet; Refill: 3  - Basic metabolic panel  - Hemoglobin A1c; Future  - aspirin (ASA) 81 MG EC tablet; Take 1 tablet (81 mg) by mouth daily  Dispense: 100 tablet; Refill: 3  - pioglitazone (ACTOS) 15 MG tablet; Take 1 tablet (15 mg) by mouth daily  Dispense: 90 tablet; Refill: 3  - losartan (COZAAR) 50 MG tablet; Take 1 tablet (50 mg) by mouth daily  Dispense: 90 tablet; Refill: 3  - Hemoglobin A1c    6. Mixed hyperlipidemia  Stable, continue on present medications.  - atorvastatin (LIPITOR) 80 MG tablet; Take 1 tablet (80 mg) by mouth daily  Dispense: 90 tablet; Refill: 3  - aspirin (ASA) 81 MG EC tablet; Take 1 tablet (81 mg) by mouth daily  Dispense: 100 tablet;  Refill: 3    This dictation uses voice recognition software, which may contain typographical errors.

## 2021-08-12 NOTE — PATIENT INSTRUCTIONS
We will contact you early next week with your test results.    I have refilled all of your medications.    We will also contact you about the follow-up seizure appointment and the new FreeStyle Constantino glucose meter.

## 2021-08-24 NOTE — PROGRESS NOTES
Attempted to call  [facility] they couldn't provide ANY information, so he will have a doctor call us back so that we can ask why he wasn't seen yet for a f/u appt.             Ashley Narvaez PA-C  [facility]      He was supposed to have hospital discharge follow up with [facility], he hasn't heard anything.   Can you call [facility] and have them contact him?

## 2021-08-24 NOTE — TELEPHONE ENCOUNTER
"  Attempted to call  [facility] they couldn't provide ANY information, so he will have a doctor call us back so that we can ask why he wasn't seen yet for a f/u appt. \" okay to relay message \" #1        Ashley Narvaez, SOCORRO  [facility]  He was supposed to have hospital discharge follow up with [facility], he hasn't heard anything.   Can you call [facility] and have them contact him?     "

## 2021-08-24 NOTE — TELEPHONE ENCOUNTER
"Spoke to , from [facility] and it looks like [facility] doesn't not contact pt to scheduled he will have to contact Nikhil from [facility] and they will place him on the scheduled for A Hospital discharge f.u here is the nnumber for him to call and scheduled that f.u appt  - [phone] \" left a very detailed message for pt too call back and get this information from us., if he calls back okay to give him the phone # above and tell him that HE Has to them to scheduled the f/u they will not contact him. Unfortunately.  "

## 2021-09-01 NOTE — PATIENT INSTRUCTIONS
1. Check glucose once daily: fasting or two hours post meal.  2. Limit CHO at snacks to no more than 30 grams, meals 45-60 grams.  3. Eliminate sugary beverages.  4. Continue with daily walking routine.  5. Schedule dental exam.  6. Take medications as prescribed.  7. Follow up with educator on 9/22/21.

## 2021-09-01 NOTE — LETTER
"    9/1/2021         RE: Jorge Young  [address]  [place] [address]        Dear Colleague,    Thank you for referring your patient, Jorge Young, to the [facility]. Please see a copy of my visit note below.    Type of Service: Telephone Visit/ 60 minutes, visit was scheduled as a video visit, but done as a telephone visit, as patient was not online.     Diabetes Self-Management Education & Support    Presents for: Follow-up. Last visit with CDE was 1/2021.     SUBJECTIVE/OBJECTIVE:  Presents for: Follow-up  Accompanied by: Self  Diabetes education in the past 24mo: Yes  Focus of Visit: Monitoring, Reducing Risks, Taking Medication, Healthy Eating, Being Active  Diabetes type: Type 2  Disease course: Worsening  Difficulty affording diabetes medication?: Sometimes (victoza was not feasible for patient)  Cultural Influences/Ethnic Background:  Not  or       Diabetes Symptoms & Complications:  Fatigue: Yes  Neuropathy: No  Polydipsia: Yes  Polyphagia: No  Polyuria: Yes  Visual change: Yes  Slow healing wounds: No  Symptom course: Worsening  Weight trend: Increasing       Patient Problem List and Family Medical History reviewed for relevant medical history, current medical status, and diabetes risk factors.    Vitals:  There were no vitals taken for this visit.  Estimated body mass index is 29.09 kg/m  as calculated from the following:    Height as of 6/27/21: 1.753 m (5' 9\").    Weight as of 8/12/21: 89.4 kg (197 lb).   Last 3 BP:   BP Readings from Last 3 Encounters:   08/12/21 (!) 142/84   06/28/21 132/72   04/19/21 120/80       History   Smoking Status     Never Smoker   Smokeless Tobacco     Never Used       Labs:  Lab Results   Component Value Date    A1C 11.7 08/12/2021    A1C 10.3 06/27/2021     Lab Results   Component Value Date     08/12/2021     06/28/2021     Lab Results   Component Value Date     11/25/2020     10/10/2019 "     Direct Measure HDL   Date Value Ref Range Status   11/25/2020 55 >=40 mg/dL Final   ]  GFR Estimate   Date Value Ref Range Status   08/12/2021 66 >60 mL/min/1.73m2 Final     Comment:     As of July 11, 2021, eGFR is calculated by the CKD-EPI creatinine equation, without race adjustment. eGFR can be influenced by muscle mass, exercise, and diet. The reported eGFR is an estimation only and is only applicable if the renal function is stable.   06/28/2021 81 >60 mL/min/[1.73_m2] Final     Comment:     Non  GFR Calc  Starting 12/18/2018, serum creatinine based estimated GFR (eGFR) will be   calculated using the Chronic Kidney Disease Epidemiology Collaboration   (CKD-EPI) equation.       GFR Estimate If Black   Date Value Ref Range Status   06/28/2021 >90 >60 mL/min/[1.73_m2] Final     Comment:      GFR Calc  Starting 12/18/2018, serum creatinine based estimated GFR (eGFR) will be   calculated using the Chronic Kidney Disease Epidemiology Collaboration   (CKD-EPI) equation.       Lab Results   Component Value Date    CR 1.15 08/12/2021    CR 0.97 06/28/2021     No results found for: MICROALBUMIN    Healthy Eating:  Healthy Eating Assessed Today: Yes  Meal planning/habits: None  Meals include: Breakfast, Lunch, Dinner, Afternoon Snack  Breakfast: patient plans to start using a protein replacement shake for his morning meal  Lunch: 2 eggs/meat, berries, vegetables  Dinner: tuna or salmon, vegetables, not very often does he have a CHO source  Snacks: chocolate/nuts/dried fruit  Other: patient states in the last few months, he has not been watching his intake and his weight has increase.  He is hoping to get back on track with healthier eating.  Beverages: Water, Other (Patient drinking a flavored water containing 20 calories/CHO calories)  Has patient met with a dietitian in the past?: Yes    Being Active:  Being Active Assessed Today: Yes (patient stated that he is walking one hour  2-3x/each day)  Exercise:: Yes  Days per week of moderate to strenuous exercise (like a brisk walk): 7  On average, minutes per day of exercise at this level: 120  How intense was your typical exercise? : Moderate (like brisk walking)  Exercise Minutes per Week: 840  Barrier to exercise: None    Monitoring:  Monitoring Assessed Today: Yes (patient is not currently monitoring his glucose and he does not know where his previous testing supplies are located.  New scripts sent today for the Accuchek meter/ strips/lancets, plus CGM to check on coverage.)  Did patient bring glucose meter to appointment? : No  Blood Glucose Meter: Accu-chek (scipt for libreview CGM sent to  speciality to check on coverage)  Times checking blood sugar at home (number): Never        Taking Medications:  Diabetes Medication(s)     Sulfonylureas       glipiZIDE (GLUCOTROL XL) 10 MG 24 hr tablet    Take 1 tablet (10 mg) by mouth daily    Insulin Sensitizing Agents       pioglitazone (ACTOS) 30 MG tablet    Take one tablet daily by mouth.          Taking Medication Assessed Today: Yes  Current Treatments: Oral Medication (taken by mouth)  Problems taking diabetes medications regularly?: No  Diabetes medication side effects?: No    Problem Solving:  Is the patient at risk for hypoglycemia?: Yes (patient is taking a SCALES and has in the past done intermittent fasting, and had s/s of low BG)  Hypoglycemia Treatment: Juice    Hypoglycemia symptoms  Dizziness or Light-Headedness: Yes  Sweats: Yes  Feeling shaky: Yes         Reducing Risks:  Reducing Risks Assessed Today: Yes  Diabetes Risks: Age over 45 years  Has dilated eye exam at least once a year?:  (patient is undergoing tx for retinopathy and macular degeneration)  Sees dentist every 6 months?: No    Healthy Coping:  Emotional response to diabetes: Unable to access  Informal Support system:: Family  Stage of change: PREPARATION (Decided to change - considering how)  Patient Activation Measure  Survey Score:  No flowsheet data found.    Diabetes knowledge and skills assessment:   Patient is knowledgeable in diabetes management concepts related to: Being Active    Patient needs further education on the following diabetes management concepts: Healthy Eating, Monitoring, Taking Medication and Reducing Risks    Based on learning assessment above, most appropriate setting for further diabetes education would be: Group class or Individual setting.      INTERVENTIONS:    Education provided today on:  AADE Self-Care Behaviors:  Healthy Eating: carbohydrate counting, consistency in amount, composition, and timing of food intake, weight reduction and portion control  Being Active: relationship to blood glucose  Monitoring: log and interpret results, individual blood glucose targets and frequency of monitoring  Taking Medication: action of prescribed medication  Problem Solving: high blood glucose - causes, signs/symptoms, treatment and prevention and low blood glucose - causes, signs/symptoms, treatment and prevention  Reducing Risks: appropriate dental care and annual eye exam    Opportunities for ongoing education and support in diabetes-self management were discussed.    Pt verbalized understanding of concepts discussed and recommendations provided today.       Education Materials Provided:  No new materials provided today      ASSESSMENT:  Follow up visit for Jorge, last visit with CDE was completed 1/2021.  Patient has had recent healthy challenges.  He had not been on track with healthy eating as before, and has regained weight that had been loss. He is hoping to get back on track with healthier meals.  He is walking daily 2 hours+.  He has not been checking his blood sugar at home, he is interested in using a CGM system.         Patient's most recent   Lab Results   Component Value Date    A1C 11.7 08/12/2021    A1C 10.3 06/27/2021    is not meeting goal of <7.0    PLAN  1. Per CDE protocol increase Actos to  30 mg/day.  2. Check glucose once daily: fasting or two hours post meal.  3. Limit CHO at snacks to no more than 30 grams, meals 45-60 grams.  4. Eliminate sugary beverages.  5. Continue with daily walking routine.  6. Schedule dental exam.  7. Take medications as prescribed.  8. Follow up with educator on 9/22/21.     See Patient Instructions for co-developed, patient-stated behavior change goals.  AVS printed and provided to patient today. See Follow-Up section for recommended follow-up.      Time Spent: 60 minutes  Encounter Type: Individual    Any diabetes medication dose changes were made via the CDE Protocol and Collaborative Practice Agreement with the patient's primary care provider. A copy of this encounter was shared with the provider.

## 2021-09-01 NOTE — PROGRESS NOTES
"Type of Service: Telephone Visit/ 60 minutes, visit was scheduled as a video visit, but done as a telephone visit, as patient was not online.     Diabetes Self-Management Education & Support    Presents for: Follow-up. Last visit with CDE was 1/2021.     SUBJECTIVE/OBJECTIVE:  Presents for: Follow-up  Accompanied by: Self  Diabetes education in the past 24mo: Yes  Focus of Visit: Monitoring, Reducing Risks, Taking Medication, Healthy Eating, Being Active  Diabetes type: Type 2  Disease course: Worsening  Difficulty affording diabetes medication?: Sometimes (victoza was not feasible for patient)  Cultural Influences/Ethnic Background:  Not  or       Diabetes Symptoms & Complications:  Fatigue: Yes  Neuropathy: No  Polydipsia: Yes  Polyphagia: No  Polyuria: Yes  Visual change: Yes  Slow healing wounds: No  Symptom course: Worsening  Weight trend: Increasing       Patient Problem List and Family Medical History reviewed for relevant medical history, current medical status, and diabetes risk factors.    Vitals:  There were no vitals taken for this visit.  Estimated body mass index is 29.09 kg/m  as calculated from the following:    Height as of 6/27/21: 1.753 m (5' 9\").    Weight as of 8/12/21: 89.4 kg (197 lb).   Last 3 BP:   BP Readings from Last 3 Encounters:   08/12/21 (!) 142/84   06/28/21 132/72   04/19/21 120/80       History   Smoking Status     Never Smoker   Smokeless Tobacco     Never Used       Labs:  Lab Results   Component Value Date    A1C 11.7 08/12/2021    A1C 10.3 06/27/2021     Lab Results   Component Value Date     08/12/2021     06/28/2021     Lab Results   Component Value Date     11/25/2020     10/10/2019     Direct Measure HDL   Date Value Ref Range Status   11/25/2020 55 >=40 mg/dL Final   ]  GFR Estimate   Date Value Ref Range Status   08/12/2021 66 >60 mL/min/1.73m2 Final     Comment:     As of July 11, 2021, eGFR is calculated by the CKD-EPI " creatinine equation, without race adjustment. eGFR can be influenced by muscle mass, exercise, and diet. The reported eGFR is an estimation only and is only applicable if the renal function is stable.   06/28/2021 81 >60 mL/min/[1.73_m2] Final     Comment:     Non  GFR Calc  Starting 12/18/2018, serum creatinine based estimated GFR (eGFR) will be   calculated using the Chronic Kidney Disease Epidemiology Collaboration   (CKD-EPI) equation.       GFR Estimate If Black   Date Value Ref Range Status   06/28/2021 >90 >60 mL/min/[1.73_m2] Final     Comment:      GFR Calc  Starting 12/18/2018, serum creatinine based estimated GFR (eGFR) will be   calculated using the Chronic Kidney Disease Epidemiology Collaboration   (CKD-EPI) equation.       Lab Results   Component Value Date    CR 1.15 08/12/2021    CR 0.97 06/28/2021     No results found for: MICROALBUMIN    Healthy Eating:  Healthy Eating Assessed Today: Yes  Meal planning/habits: None  Meals include: Breakfast, Lunch, Dinner, Afternoon Snack  Breakfast: patient plans to start using a protein replacement shake for his morning meal  Lunch: 2 eggs/meat, berries, vegetables  Dinner: tuna or salmon, vegetables, not very often does he have a CHO source  Snacks: chocolate/nuts/dried fruit  Other: patient states in the last few months, he has not been watching his intake and his weight has increase.  He is hoping to get back on track with healthier eating.  Beverages: Water, Other (Patient drinking a flavored water containing 20 calories/CHO calories)  Has patient met with a dietitian in the past?: Yes    Being Active:  Being Active Assessed Today: Yes (patient stated that he is walking one hour 2-3x/each day)  Exercise:: Yes  Days per week of moderate to strenuous exercise (like a brisk walk): 7  On average, minutes per day of exercise at this level: 120  How intense was your typical exercise? : Moderate (like brisk walking)  Exercise  Minutes per Week: 840  Barrier to exercise: None    Monitoring:  Monitoring Assessed Today: Yes (patient is not currently monitoring his glucose and he does not know where his previous testing supplies are located.  New scripts sent today for the Accuchek meter/ strips/lancets, plus CGM to check on coverage.)  Did patient bring glucose meter to appointment? : No  Blood Glucose Meter: Accu-chek (scipt for libreview CGM sent to  speciality to check on coverage)  Times checking blood sugar at home (number): Never        Taking Medications:  Diabetes Medication(s)     Sulfonylureas       glipiZIDE (GLUCOTROL XL) 10 MG 24 hr tablet    Take 1 tablet (10 mg) by mouth daily    Insulin Sensitizing Agents       pioglitazone (ACTOS) 30 MG tablet    Take one tablet daily by mouth.          Taking Medication Assessed Today: Yes  Current Treatments: Oral Medication (taken by mouth)  Problems taking diabetes medications regularly?: No  Diabetes medication side effects?: No    Problem Solving:  Is the patient at risk for hypoglycemia?: Yes (patient is taking a SCALES and has in the past done intermittent fasting, and had s/s of low BG)  Hypoglycemia Treatment: Juice    Hypoglycemia symptoms  Dizziness or Light-Headedness: Yes  Sweats: Yes  Feeling shaky: Yes         Reducing Risks:  Reducing Risks Assessed Today: Yes  Diabetes Risks: Age over 45 years  Has dilated eye exam at least once a year?:  (patient is undergoing tx for retinopathy and macular degeneration)  Sees dentist every 6 months?: No    Healthy Coping:  Emotional response to diabetes: Unable to access  Informal Support system:: Family  Stage of change: PREPARATION (Decided to change - considering how)  Patient Activation Measure Survey Score:  No flowsheet data found.    Diabetes knowledge and skills assessment:   Patient is knowledgeable in diabetes management concepts related to: Being Active    Patient needs further education on the following diabetes management  concepts: Healthy Eating, Monitoring, Taking Medication and Reducing Risks    Based on learning assessment above, most appropriate setting for further diabetes education would be: Group class or Individual setting.      INTERVENTIONS:    Education provided today on:  AADE Self-Care Behaviors:  Healthy Eating: carbohydrate counting, consistency in amount, composition, and timing of food intake, weight reduction and portion control  Being Active: relationship to blood glucose  Monitoring: log and interpret results, individual blood glucose targets and frequency of monitoring  Taking Medication: action of prescribed medication  Problem Solving: high blood glucose - causes, signs/symptoms, treatment and prevention and low blood glucose - causes, signs/symptoms, treatment and prevention  Reducing Risks: appropriate dental care and annual eye exam    Opportunities for ongoing education and support in diabetes-self management were discussed.    Pt verbalized understanding of concepts discussed and recommendations provided today.       Education Materials Provided:  No new materials provided today      ASSESSMENT:  Follow up visit for Jorge, last visit with CDE was completed 1/2021.  Patient has had recent healthy challenges.  He had not been on track with healthy eating as before, and has regained weight that had been loss. He is hoping to get back on track with healthier meals.  He is walking daily 2 hours+.  He has not been checking his blood sugar at home, he is interested in using a CGM system.         Patient's most recent   Lab Results   Component Value Date    A1C 11.7 08/12/2021    A1C 10.3 06/27/2021    is not meeting goal of <7.0    PLAN  1. Per CDE protocol increase Actos to 30 mg/day.  2. Check glucose once daily: fasting or two hours post meal.  3. Limit CHO at snacks to no more than 30 grams, meals 45-60 grams.  4. Eliminate sugary beverages.  5. Continue with daily walking routine.  6. Schedule dental  exam.  7. Take medications as prescribed.  8. Follow up with educator on 9/22/21.     See Patient Instructions for co-developed, patient-stated behavior change goals.  AVS printed and provided to patient today. See Follow-Up section for recommended follow-up.      Time Spent: 60 minutes  Encounter Type: Individual    Any diabetes medication dose changes were made via the CDE Protocol and Collaborative Practice Agreement with the patient's primary care provider. A copy of this encounter was shared with the provider.

## 2021-09-22 NOTE — PROGRESS NOTES
"Type of Service: Telephone Visit/ 24 minutes    Diabetes Self-Management Education & Support    Presents for: Follow-up    SUBJECTIVE/OBJECTIVE:  Presents for: Follow-up  Accompanied by: Self  Diabetes education in the past 24mo: Yes  Focus of Visit: Monitoring, Taking Medication, Healthy Eating, Being Active, Reducing Risks  Diabetes type: Type 2  Transportation concerns: Yes (he is not able to drive, so walks everywhere or needs to have someone give him a ride)  Other concerns:: None  Cultural Influences/Ethnic Background:  Not  or       Diabetes Symptoms & Complications:  Fatigue: Yes (patient feels like this is related to his medication for seizures)  Neuropathy: No  Polydipsia: No  Polyphagia: No  Polyuria: No  Visual change:  (patient reported that his vision is improving with a herbal tea mixture that he is taking)  Slow healing wounds: No  Symptom course: Improving  Weight trend: Decreasing       Patient Problem List and Family Medical History reviewed for relevant medical history, current medical status, and diabetes risk factors.    Vitals:  There were no vitals taken for this visit.  Estimated body mass index is 29.09 kg/m  as calculated from the following:    Height as of 6/27/21: 1.753 m (5' 9\").    Weight as of 8/12/21: 89.4 kg (197 lb).   Last 3 BP:   BP Readings from Last 3 Encounters:   08/12/21 (!) 142/84   06/28/21 132/72   04/19/21 120/80       History   Smoking Status     Never Smoker   Smokeless Tobacco     Never Used       Labs:  Lab Results   Component Value Date    A1C 11.7 08/12/2021    A1C 10.3 06/27/2021     Lab Results   Component Value Date     08/12/2021     06/28/2021     Lab Results   Component Value Date     11/25/2020     10/10/2019     Direct Measure HDL   Date Value Ref Range Status   11/25/2020 55 >=40 mg/dL Final   ]  GFR Estimate   Date Value Ref Range Status   08/12/2021 66 >60 mL/min/1.73m2 Final     Comment:     As of July 11, 2021, " eGFR is calculated by the CKD-EPI creatinine equation, without race adjustment. eGFR can be influenced by muscle mass, exercise, and diet. The reported eGFR is an estimation only and is only applicable if the renal function is stable.   06/28/2021 81 >60 mL/min/[1.73_m2] Final     Comment:     Non  GFR Calc  Starting 12/18/2018, serum creatinine based estimated GFR (eGFR) will be   calculated using the Chronic Kidney Disease Epidemiology Collaboration   (CKD-EPI) equation.       GFR Estimate If Black   Date Value Ref Range Status   06/28/2021 >90 >60 mL/min/[1.73_m2] Final     Comment:      GFR Calc  Starting 12/18/2018, serum creatinine based estimated GFR (eGFR) will be   calculated using the Chronic Kidney Disease Epidemiology Collaboration   (CKD-EPI) equation.       Lab Results   Component Value Date    CR 1.15 08/12/2021    CR 0.97 06/28/2021     No results found for: MICROALBUMIN    Healthy Eating:  Healthy Eating Assessed Today: Yes  Meal planning/habits: Low carb  Meals include: Breakfast, Lunch, Dinner  Breakfast: protein replacement drink  Lunch: skips or has eggs/meat, berries, vegetables  Dinner: tuna or salmon, vegetables  Snacks: not very often  Beverages: Water    Being Active:  Being Active Assessed Today: Yes  Exercise:: Yes  Days per week of moderate to strenuous exercise (like a brisk walk): 7  On average, minutes per day of exercise at this level: 120  How intense was your typical exercise? : Moderate (like brisk walking)  Exercise Minutes per Week: 840  Barrier to exercise: None    Monitoring:  Monitoring Assessed Today: Yes  Did patient bring glucose meter to appointment? : No (patient just picked up his glucose meter on 9/20, he has not started checking yet.)  Blood Glucose Meter: Accu-chek        Taking Medications:  Diabetes Medication(s)     Sulfonylureas       glipiZIDE (GLUCOTROL XL) 10 MG 24 hr tablet    Take 1 tablet (10 mg) by mouth daily    Insulin  Sensitizing Agents       pioglitazone (ACTOS) 30 MG tablet    Take one tablet daily by mouth.          Taking Medication Assessed Today: Yes  Current Treatments: Diet, Oral Medication (taken by mouth)  Problems taking diabetes medications regularly?: Yes (patient did not increase his Actos to 30 mg as prescribed on 9/1/21, recommended using up his 15 mg, taking two tablets, then switching over to one 30 mg tablet.)  Diabetes medication side effects?: No    Problem Solving:  Is the patient at risk for hypoglycemia?: No              Reducing Risks:  Has dilated eye exam at least once a year?:  (patient had an eye exam in 2020, he is due for 2021)  Sees dentist every 6 months?:  (patient needs a dental appointment for 2021)    Healthy Coping:  Emotional response to diabetes: Acceptance, Concern for health and well-being  Stage of change: ACTION (Actively working towards change)  Patient Activation Measure Survey Score:  No flowsheet data found.    Diabetes knowledge and skills assessment:   Patient is knowledgeable in diabetes management concepts related to: Healthy Eating and Being Active    Patient needs further education on the following diabetes management concepts: Monitoring, Taking Medication and Reducing Risks    Based on learning assessment above, most appropriate setting for further diabetes education would be: Group class or Individual setting.      INTERVENTIONS:    Education provided today on:  AADE Self-Care Behaviors:  Healthy Eating: patient following low CHO diet  Being Active: describe appropriate activity program and discussed adding in strengthening, given [employer] for a resource  Monitoring: log and interpret results, individual blood glucose targets and frequency of monitoring  Taking Medication: correct dose for Actos  Reducing Risks: appropriate dental care and annual eye exam    Opportunities for ongoing education and support in diabetes-self management were discussed.    Pt verbalized  understanding of concepts discussed and recommendations provided today.       Education Materials Provided:  No new materials provided today      ASSESSMENT:  Follow up visit for Jorge today.  He has continued to walk everywhere, 2-3 hours/daily. He is not able to drive at this time.  He is following a low CHO diet and has lost 6# in the past two weeks. He would like to reduce weight to 170-175#, current weight is 196#. Patient just picked up his meter on 9/20, he has not started checking as of yet.  He did not increase his Actos to 30 mg, since 9/1/21 visit recommendation.         Patient's most recent   Lab Results   Component Value Date    A1C 11.7 08/12/2021    A1C 10.3 06/27/2021    is not meeting goal of <7.0    PLAN  1. Test glucose once daily: alternate between fasting and two hours post meal.  2. Increase Actos to 30 mg.  3. Schedule eye and dental exam  4. Follow up with educator on 10/6/21    See Patient Instructions for co-developed, patient-stated behavior change goals.  AVS printed and provided to patient today. See Follow-Up section for recommended follow-up.      Time Spent: 24 minutes  Encounter Type: Individual    Any diabetes medication dose changes were made via the CDE Protocol and Collaborative Practice Agreement with the patient's referring provider. A copy of this encounter was shared with the provider.

## 2021-09-22 NOTE — PATIENT INSTRUCTIONS
1. Test glucose once daily: alternate between fasting and two hours post meal.  2. Increase Actos to 30 mg.  3. Schedule eye and dental exam  4. Follow up with educator on 10/6/21

## 2021-09-22 NOTE — LETTER
"    9/22/2021         RE: Jorge Young  [address]  [place] [address]        Dear Colleague,    Thank you for referring your patient, Jorge Young, to the [facility]. Please see a copy of my visit note below.    Type of Service: Telephone Visit/ 24 minutes    Diabetes Self-Management Education & Support    Presents for: Follow-up    SUBJECTIVE/OBJECTIVE:  Presents for: Follow-up  Accompanied by: Self  Diabetes education in the past 24mo: Yes  Focus of Visit: Monitoring, Taking Medication, Healthy Eating, Being Active, Reducing Risks  Diabetes type: Type 2  Transportation concerns: Yes (he is not able to drive, so walks everywhere or needs to have someone give him a ride)  Other concerns:: None  Cultural Influences/Ethnic Background:  Not  or       Diabetes Symptoms & Complications:  Fatigue: Yes (patient feels like this is related to his medication for seizures)  Neuropathy: No  Polydipsia: No  Polyphagia: No  Polyuria: No  Visual change:  (patient reported that his vision is improving with a herbal tea mixture that he is taking)  Slow healing wounds: No  Symptom course: Improving  Weight trend: Decreasing       Patient Problem List and Family Medical History reviewed for relevant medical history, current medical status, and diabetes risk factors.    Vitals:  There were no vitals taken for this visit.  Estimated body mass index is 29.09 kg/m  as calculated from the following:    Height as of 6/27/21: 1.753 m (5' 9\").    Weight as of 8/12/21: 89.4 kg (197 lb).   Last 3 BP:   BP Readings from Last 3 Encounters:   08/12/21 (!) 142/84   06/28/21 132/72   04/19/21 120/80       History   Smoking Status     Never Smoker   Smokeless Tobacco     Never Used       Labs:  Lab Results   Component Value Date    A1C 11.7 08/12/2021    A1C 10.3 06/27/2021     Lab Results   Component Value Date     08/12/2021     06/28/2021     Lab Results   Component Value Date    "  11/25/2020     10/10/2019     Direct Measure HDL   Date Value Ref Range Status   11/25/2020 55 >=40 mg/dL Final   ]  GFR Estimate   Date Value Ref Range Status   08/12/2021 66 >60 mL/min/1.73m2 Final     Comment:     As of July 11, 2021, eGFR is calculated by the CKD-EPI creatinine equation, without race adjustment. eGFR can be influenced by muscle mass, exercise, and diet. The reported eGFR is an estimation only and is only applicable if the renal function is stable.   06/28/2021 81 >60 mL/min/[1.73_m2] Final     Comment:     Non  GFR Calc  Starting 12/18/2018, serum creatinine based estimated GFR (eGFR) will be   calculated using the Chronic Kidney Disease Epidemiology Collaboration   (CKD-EPI) equation.       GFR Estimate If Black   Date Value Ref Range Status   06/28/2021 >90 >60 mL/min/[1.73_m2] Final     Comment:      GFR Calc  Starting 12/18/2018, serum creatinine based estimated GFR (eGFR) will be   calculated using the Chronic Kidney Disease Epidemiology Collaboration   (CKD-EPI) equation.       Lab Results   Component Value Date    CR 1.15 08/12/2021    CR 0.97 06/28/2021     No results found for: MICROALBUMIN    Healthy Eating:  Healthy Eating Assessed Today: Yes  Meal planning/habits: Low carb  Meals include: Breakfast, Lunch, Dinner  Breakfast: protein replacement drink  Lunch: skips or has eggs/meat, berries, vegetables  Dinner: tuna or salmon, vegetables  Snacks: not very often  Beverages: Water    Being Active:  Being Active Assessed Today: Yes  Exercise:: Yes  Days per week of moderate to strenuous exercise (like a brisk walk): 7  On average, minutes per day of exercise at this level: 120  How intense was your typical exercise? : Moderate (like brisk walking)  Exercise Minutes per Week: 840  Barrier to exercise: None    Monitoring:  Monitoring Assessed Today: Yes  Did patient bring glucose meter to appointment? : No (patient just picked up his glucose  meter on 9/20, he has not started checking yet.)  Blood Glucose Meter: Accu-chek        Taking Medications:  Diabetes Medication(s)     Sulfonylureas       glipiZIDE (GLUCOTROL XL) 10 MG 24 hr tablet    Take 1 tablet (10 mg) by mouth daily    Insulin Sensitizing Agents       pioglitazone (ACTOS) 30 MG tablet    Take one tablet daily by mouth.          Taking Medication Assessed Today: Yes  Current Treatments: Diet, Oral Medication (taken by mouth)  Problems taking diabetes medications regularly?: Yes (patient did not increase his Actos to 30 mg as prescribed on 9/1/21, recommended using up his 15 mg, taking two tablets, then switching over to one 30 mg tablet.)  Diabetes medication side effects?: No    Problem Solving:  Is the patient at risk for hypoglycemia?: No              Reducing Risks:  Has dilated eye exam at least once a year?:  (patient had an eye exam in 2020, he is due for 2021)  Sees dentist every 6 months?:  (patient needs a dental appointment for 2021)    Healthy Coping:  Emotional response to diabetes: Acceptance, Concern for health and well-being  Stage of change: ACTION (Actively working towards change)  Patient Activation Measure Survey Score:  No flowsheet data found.    Diabetes knowledge and skills assessment:   Patient is knowledgeable in diabetes management concepts related to: Healthy Eating and Being Active    Patient needs further education on the following diabetes management concepts: Monitoring, Taking Medication and Reducing Risks    Based on learning assessment above, most appropriate setting for further diabetes education would be: Group class or Individual setting.      INTERVENTIONS:    Education provided today on:  AADE Self-Care Behaviors:  Healthy Eating: patient following low CHO diet  Being Active: describe appropriate activity program and discussed adding in strengthening, given [employer] for a resource  Monitoring: log and interpret results, individual blood glucose  targets and frequency of monitoring  Taking Medication: correct dose for Actos  Reducing Risks: appropriate dental care and annual eye exam    Opportunities for ongoing education and support in diabetes-self management were discussed.    Pt verbalized understanding of concepts discussed and recommendations provided today.       Education Materials Provided:  No new materials provided today      ASSESSMENT:  Follow up visit for Jorge today.  He has continued to walk everywhere, 2-3 hours/daily. He is not able to drive at this time.  He is following a low CHO diet and has lost 6# in the past two weeks. He would like to reduce weight to 170-175#, current weight is 196#. Patient just picked up his meter on 9/20, he has not started checking as of yet.  He did not increase his Actos to 30 mg, since 9/1/21 visit recommendation.         Patient's most recent   Lab Results   Component Value Date    A1C 11.7 08/12/2021    A1C 10.3 06/27/2021    is not meeting goal of <7.0    PLAN  1. Test glucose once daily: alternate between fasting and two hours post meal.  2. Increase Actos to 30 mg.  3. Schedule eye and dental exam  4. Follow up with educator on 10/6/21    See Patient Instructions for co-developed, patient-stated behavior change goals.  AVS printed and provided to patient today. See Follow-Up section for recommended follow-up.      Time Spent: 24 minutes  Encounter Type: Individual    Any diabetes medication dose changes were made via the CDE Protocol and Collaborative Practice Agreement with the patient's referring provider. A copy of this encounter was shared with the provider.

## 2021-11-03 PROBLEM — I65.22 ATHEROSCLEROSIS OF LEFT CAROTID ARTERY: Status: ACTIVE | Noted: 2021-01-01

## 2021-11-03 PROBLEM — I63.9 ACUTE CVA (CEREBROVASCULAR ACCIDENT) (H): Status: ACTIVE | Noted: 2021-01-01

## 2021-11-03 NOTE — ED TRIAGE NOTES
Patient presents here for evaluation of confusion. His son notes that he talked with him this morning at about 06:30am this morning and see seemed well. At about 07:00am, he felt that he was confused and brought him to his physician's clinic for eval, he was then sent here. He appears to be exhibiting expressive aphasia, having difficulty with verbalizing some words when answering questions. He does not have any unilateral weakness to extremities.

## 2021-11-03 NOTE — PROGRESS NOTES
"Subjective:  66 year old male with concerns of confusion.  Here with son Les.  Son talk to him at 6:30 in the morning on the phone.  He seemed to have normal mentation at that point.  Son went to pick him up to run some errands at about 730.  He was confused at that point in time and they diverted their errands and were able to have an acute visit here at clinic.    Patient has been confused at least since son has been with him at 730 this morning.  There is word finding difficulty.  There is no gross motor deficit.  She states he has not been feeling very well but is unable to give any substantive answers due to the word finding difficulty.  Frequently repeats, \" I have just, you know...\" And then being unable to provide any substantive information.  Says he ate this AM but unable to find the words to describe what he ate.    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 A co worker noted that he was confused on Monday.    Patient lives alone.  Had things out in his kitchen: a , looking like he would make a smoothie, but had put all the parts (including the electrified base) into the sink.    He did not provide his name upon initial greeting, when prompted.    Admits to having some nausea and vomiting and actually becomes fairly newly nauseous and vomits while in the room.    The denies fevers or chills.  Unable to further complete a review of systems.    History includes:  Poorly controlled diabetes  Hypertension is poorly controlled today  Hx stroke  Hx of seizure disorder    Hospitalized June 28 through July 6.  [facility].  Seizure disorder  Problem list notes  Localization-related (focal) (partial) symptomatic epilepsy and epileptic syndromes with complex partial seizures, not intractable, without status epilepticus (HC)      MRI October 2020 [facility].  IMPRESSION:   1.  No acute/subacute infarction, acute intracranial hemorrhage, mass effect, or hydrocephalus.   2. "  Continued evolution of multifocal late subacute to chronic infarctions in the right MCA distribution with areas of hemosiderin deposition in the precentral and postcentral gyri.   3.  Mild global brain parenchymal volume loss with presumed sequelae of mild to moderate chronic small vessel ischemic disease.    Lab Results   Component Value Date    A1C 11.7 08/12/2021    A1C 10.3 06/27/2021    A1C 9.9 04/19/2021    A1C 8.5 11/25/2020    A1C 9.0 10/10/2019       Outpatient Medications Prior to Visit   Medication Sig Dispense Refill     ACCU-CHEK GUIDE test strip Use to test blood sugar 1 time daily. 50 strip 11     aspirin (ASA) 81 MG EC tablet Take 1 tablet (81 mg) by mouth daily 100 tablet 3     atorvastatin (LIPITOR) 80 MG tablet Take 1 tablet (80 mg) by mouth daily 90 tablet 3     blood glucose monitoring (SOFTCLIX) lancets Use to test blood sugar 1 time daily. 100 each 4     Blood Glucose Monitoring Suppl (ACCU-CHEK GUIDE ME) w/Device KIT 1 each daily 1 kit 0     glipiZIDE (GLUCOTROL XL) 10 MG 24 hr tablet Take 1 tablet (10 mg) by mouth daily 90 tablet 3     hydrochlorothiazide (HYDRODIURIL) 50 MG tablet Take 1 tablet (50 mg) by mouth daily 90 tablet 3     losartan (COZAAR) 50 MG tablet Take 1 tablet (50 mg) by mouth daily 90 tablet 3     pioglitazone (ACTOS) 30 MG tablet Take one tablet daily by mouth. 30 tablet 1     Continuous Blood Gluc  (FREESTYLE MICHELLE READER) MICHAEL 1 each every 8 hours Use reader to scan sensor a minimum of every 8 hours. (Patient not taking: Reported on 11/3/2021) 1 each 0     Continuous Blood Gluc Sensor (FREESTYLE MICHELLE 14 DAY SENSOR) MISC 1 each every 14 days Change every 14 days. (Patient not taking: Reported on 11/3/2021) 2 each 5     No facility-administered medications prior to visit.      History   Smoking Status     Never Smoker   Smokeless Tobacco     Never Used      Objective:  BP (!) 172/98 (BP Location: Left arm, Patient Position: Sitting, Cuff Size: Adult Large)    Pulse 90   Temp 98  F (36.7  C) (Oral)   Resp 20   Wt 89.8 kg (198 lb)   BMI 29.24 kg/m    GEN: alert, responds slowly, confused--waxes and wanes a bit  Vomits during the visit--green, no blood or coffee grounds  RESP: clear, no cough, wheeze, rhonchi  CV: regular rate and rhythm, no murmurs  NEURO: significant word finding difficulties.  PERRL, EOMI  No pronator drift, able to ambulate  Finger tapping ok    Assessment and Plan:   I discussed with patient and son that this could be an acute stroke and he should be evaluated in the emergency setting.  I recommended an ambulance transport.  The pair declined that transport.  They wanted to proceed to a hospital emergency room privately.  We discussed that that was against my medical advice.  They did acknowledge that and the risks associated with delay in care.  I did speak to a physician at [facility] emergency room and discussed  the case with the physician there.  We then discussed that could proceed there.  They left urgently with the plan to do so.    1. Acute encephalopathy  2. Word finding difficulty  3. Type 2 diabetes mellitus with both eyes affected by moderate nonproliferative retinopathy and macular edema, without long-term current use of insulin (H)  4. Secondary hypertension

## 2021-11-03 NOTE — CONSULTS
"      [facility]    Stroke Telephone Note    I was called by Brayden Haq on 11/03/21 regarding patient oJrge Young. The patient is a 66 year old male who presents to the ED after having difficulty speaking starting since 7 am. He was last normal when talking to his son on the phone at 0630 today, but might have had some symptoms last night as well.  He has a history of R MCA cortical infarct earlier this year as well as seizure disorder from that.    BP in the ED is low 200s systolic    Stroke Code Data (for stroke code without tele)  Stroke code activated 11/03/21   1155   Stroke provider first response  11/03/21   1155            Last known normal 11/03/21   0630        Time of discovery   (or onset of symptoms) 11/03/21   0700   Head CT read by Stroke Neuro Dr/Provider 11/03/21   1226   Was stroke code de-escalated? Yes 11/03/21 1244          Imaging Findings   CT/CTA show no acute findings, but R side cortical embolic areas of stroke are seen on my review, and there is significant plaque causing stenosis above the carotid bifurcations bilaterally, L>R. No LVO. Formal read by radiology pending    Thrombolytic Treatment   Not given due to unclear or unfavorable risk-benefit profile for extended window thrombolysis beyond the conventional 4.5 hour time window.    Endovascular Treatment  Not initiated due to absence of proximal vessel occlusion    Impression  Aphasia, suspected due to acute stroke in the left hemisphere. Consideration here of symptomatic L ICA stenosis requiring revascularization    Recommendations   - Use orderset: \"Ischemic Stroke Routine Admission\" or \"Ischemic Stroke No Thrombolytics/No Thrombectomy ICU Admission\"  - Neurochecks and Vital Signs every 4 hours   - Permissive HTN; goal SBP < 220 mmHg  - Statin: treat to goal LDL 40-70  - Check A1c and treat to goal <7.0  - MRI Brain without contrast -- antiplatelet plan TBD based on results  - TTE (with Bubble Study " "if age 60 yrs or less)  - Telemetry, EKG  - Bedside Glucose Monitoring  - Check troponin x 3  - PT/OT/SLP  - Stroke Education  - Euthermia, Euglycemia    My recommendations are based on the information provided over the phone by Jorge Young's in-person providers. They are not intended to replace the clinical judgment of his in-person providers. I was not requested to personally see or examine the patient at this time.    Discussed with my attending Dr. Mary Hill PA-C  Neurology  11/03/2021 12:50 PM  To page me or covering stroke neurology team member, click here: AMCOM   Choose \"On Call\" tab at top, then search dropdown box for \"Neurology Adult\", select location, press Enter, then look for stroke/neuro ICU/telestroke.        "

## 2021-11-03 NOTE — PROGRESS NOTES
"Stroke neurology addendum to earlier stroke code note    MRI brain shows acute infarcts in the L hemisphere in a pattern concerning for symptomatic carotid stenosis. Recommend hospitalist discuss with vascular surgery. Option to do further investigation with perhaps carotid duplex, defer to vascular. Stroke neurology's recommendation is for either heparin drip or DAPT (whichever is preferred by vascular surgery) as a bridge to carotid revascularization during this admission.    Discussed the above with Saran Oleary and Eun. Dr Haq will page the admitting hospitalist.    Blanca Hill PA-C  Vascular Neurology  11/03/2021 2:01 PM  Securely message with the Vocera Web Console (learn more here)  To page me or covering stroke neurology team member, click here: AMCOM  Choose \"On Call\" tab at top, then search dropdown box for \"Neurology Adult\" & press Enter, look for Neuro ICU/Stroke    "

## 2021-11-03 NOTE — ED NOTES
[facility] ED Handoff Report    ED Chief Complaint:    ED Diagnosis:  (I63.9) Acute CVA (cerebrovascular accident) (H)      (I65.22) Atherosclerosis of left carotid artery         PMH:    Past Medical History:   Diagnosis Date     Benign essential hypertension      COVID-19 virus infection      Diabetes mellitus (H)      Hyperlipidemia LDL goal <100      Left facial numbness 07/16/2020     Left hand weakness 07/16/2020     Stroke (H)         Code Status:  Full Code     Falls Risk: Yes Band: Applied    Current Living Situation/Residence: lives alone     Elimination Status: Continent: Yes     Activity Level. Pt independent at baseline; pt is been in bedrest and haven't got him out of bed so I am not sure how he will do it.    Patients Preferred Language:  English     Needed: No    Vital Signs:  BP (!) 180/94   Pulse 77   Temp 99.5  F (37.5  C) (Oral)   Resp 16   Wt 89.8 kg (198 lb)   SpO2 96%   BMI 29.24 kg/m       Cardiac Rhythm: NSR    Pain Score: no pain at this time.    Is the Patient Confused:  No    Last Food or Drink: Today early in the morning    Focused Assessment: Patient presents here for evaluation of confusion after son noticed he seemed confused. Pt seem okay early  In the morning per son. pt appears to have difficulty finding words and takes longer to answers questions at times. does not have any unilateral weakness to extremities. Pt a little confused early( he thought the year was 1993). Pt alert and oriented x4 now.    Tests Performed labs and imaging    Treatments Provided:  Medication and fluids    Family Dynamics/Concerns: No    Family Updated On Visitor Policy: Yes    Plan of Care Communicated to Family: Yes    Who Was Updated about Plan of Care: son Les was at the bedside most of the pt stay at the ED    Belongings Checklist Done and Signed by Patient: Yes    Covid: asymptomatic , negative    Additional Information: pt was stroke code initially that was cancelled  after CT showed no evidence of stroke; however MRI did show acute ischemic stroke. Pt was given aspirin, Plavix and heparin.

## 2021-11-03 NOTE — H&P
[facility]    History and Physical - Hospitalist Service       Date of Admission:  11/3/2021    Assessment & Plan      Jorge Young is a 66 year old male admitted on 11/3/2021. He has a hx of RCA CVA, hx of post cva sz, dm, lipids, htn presents with acute onset aphasia today, confusion x 2 days, headache x 2 days. He has ruled in for acute CVA       1. Acute to cva in pt with hx of R CVA infarct in past  -with behavior off x 2 days, headache 2 days process not just today  -this am 0730 some aphasia noted by son  -outside window TPA  -CTA no acute cva, but ulcerative LICA  -admit neuro tele  -MRI-pending  -has been on ASA-81 mg + Statin-high dose, likely need to add Plavix--both vascular and neuro agree, add Plavix 75 mg now  -Neuro consult  -Vascular consult  -pt/ot  -speech  -allow elevated bp  -tele  -check echo    2.Ulcerative Left ICA  -get carotid ultrasound now  -Vascular called by me and will see here  -ASA, high dose statin, and add Plavix    3.Hx of sz  -months after last cva  -has been on Keppra, he is not sure if he took it yesterday  -son notes unusual behavior yesterday, ?sz vs cva  -continue Keppra-- and check level  - eeg  -neuro to see  -he does not drive due to sz hx    4.hx of DM  -on po meds- hold  -cover with insulin  -check A1c--last one 8/12/21 11.7, now today 9.4, still high joão with vascular dx  -likely needs insulin and not just po meds--start lantus tonight  -Will need DM ed    5.HTN  -allow elevated bp now    6.hx of lipids  -on statin-high dose    7.Pseudo-Hyponatremia with hyperglycemia  -corrected NA is 137 when account for hyperglycemia    8.Full code    9.DVT prevent- hep subcutaneous    Covid status--vaccinated, and today's covid test is negative    Addendum--1930  Vascular now considering left carotid endarterectomy tomorrow           Disposition Plan   Expected discharge: days, neuro eval     The patient's care was discussed with the Patient and  Patient's Family.son is here      Brenda Auguste MD  [facility]  Securely message with the [employer] Web Console (learn more here)  Text page via [employer] Paging/Directory        ______________________________________________________________________    Chief Complaint   Speech issues and confusion per son    Hx from pt and son    History of Present Illness   Jorge Young is a 66 year old male who has a hx of RCA CVA, hx of post cva sz, dm, lipids, htn presents with acute onset aphasia today, confusion x 2 days headache x 2 days.    Fist, he lives alone in an apartment attached to his shop(recent divorce)  This am he called son at 0630 and seemed fine. His son picked him up at 0730 and noticed right away he was off. He seemed confused and his speech was off. He knew something was wrong.  Son notes  told him this am that Jorge was acting unusual yesterday. He made a smoothie and then dumped all of the smoothie in the sink and also dumped the  in the sink as well.   Son notes  also told him Jorge seemed to have some stomach issues yesterday with emesis    Jorge notes he did not feel well yesterday. He notes headache x 2 days and does agree this am hard to get his words out.  Jorge denied any focal weakness or numbness  Jorge tells me he can't remember much about yesterday  Both Jorge and his son feel his speech is improving since being in ER    Son notes CVA back 1 and 1/2 years ago. Seemed to make complete recovery, but 3 months after cva had sz, syncope and has not driven since. He also notes back 7/21 had episode with confusion but when they checked EEG it was negative.      Review of Systems    CONSTITUTIONAL:  negative  EYES:  negative  HEENT:  negative  RESPIRATORY:  negative  CARDIOVASCULAR:  negative  GASTROINTESTINAL:  positive for nausea and vomiting  GENITOURINARY:  negative  INTEGUMENT/BREAST:  negative  HEMATOLOGIC/LYMPHATIC:   negative  ALLERGIC/IMMUNOLOGIC:  negative  ENDOCRINE:  positive for dm  MUSCULOSKELETAL:  negative  NEUROLOGICAL:  positive for hx of R CVA in past, hx of sz after CVA now today concern new CVA  BEHAVIOR/PSYCH:  Confusion yesterday     Past Medical History    I have reviewed this patient's medical history and updated it with pertinent information if needed.   Past Medical History:   Diagnosis Date     Benign essential hypertension      COVID-19 virus infection      Diabetes mellitus (H)      Hyperlipidemia LDL goal <100      Left facial numbness 2020     Left hand weakness 2020     Stroke (H)        Past Surgical History   I have reviewed this patient's surgical history and updated it with pertinent information if needed.  History reviewed. No pertinent surgical history.    Social History   I have reviewed this patient's social history and updated it with pertinent information if needed.  Social History     Tobacco Use     Smoking status: Never Smoker     Smokeless tobacco: Never Used   Substance Use Topics     Alcohol use: None     Drug use: None       FH  Mom CVA     Prior to Admission Medications   Prior to Admission Medications   Prescriptions Last Dose Informant Patient Reported? Taking?   ACCU-CHEK GUIDE test strip   No No   Sig: Use to test blood sugar 1 time daily.   Blood Glucose Monitoring Suppl (ACCU-CHEK GUIDE ME) w/Device KIT   No No   Si each daily   Continuous Blood Gluc  (FREESTYLE MICHELLE READER) MICHAEL   No No   Si each every 8 hours Use reader to scan sensor a minimum of every 8 hours.   Patient not taking: Reported on 11/3/2021   Continuous Blood Gluc Sensor (FREESTYLE MICHELLE 14 DAY SENSOR) [facility]   No No   Si each every 14 days Change every 14 days.   Patient not taking: Reported on 11/3/2021   aspirin (ASA) 81 MG EC tablet   No No   Sig: Take 1 tablet (81 mg) by mouth daily   atorvastatin (LIPITOR) 80 MG tablet   No No   Sig: Take 1 tablet (80 mg) by mouth daily   blood  glucose monitoring (SOFTCLIX) lancets   No No   Sig: Use to test blood sugar 1 time daily.   glipiZIDE (GLUCOTROL XL) 10 MG 24 hr tablet   No No   Sig: Take 1 tablet (10 mg) by mouth daily   hydrochlorothiazide (HYDRODIURIL) 50 MG tablet   No No   Sig: Take 1 tablet (50 mg) by mouth daily   losartan (COZAAR) 50 MG tablet   No No   Sig: Take 1 tablet (50 mg) by mouth daily   pioglitazone (ACTOS) 30 MG tablet   No No   Sig: Take one tablet daily by mouth.      Facility-Administered Medications: None     Allergies   Allergies   Allergen Reactions     Novocain [Procaine] Other (See Comments)     Tiredness         Physical Exam   Vital Signs: Temp: 99.5  F (37.5  C) Temp src: Oral BP: (!) 188/97 Pulse: 80   Resp: 16 SpO2: 99 %      Weight: 198 lbs 0 oz    Constitutional: awake, alert, cooperative and no apparent distress  Eyes: pupils equal, sclera clear  ENT: normocepalic, without obvious abnormality  Hematologic / Lymphatic: no cervical lymphadenopathy  Respiratory: No increased work of breathing, good air exchange, clear to auscultation bilaterally, no crackles or wheezing  Cardiovascular: Normal apical impulse, regular rate and rhythm, normal S1 and S2, no S3 or S4, and no murmur noted  GI: normal bowel sounds, soft, non-distended and non-tender  Skin: no bruising or bleeding  Musculoskeletal: no lower extremity pitting edema present  Neurologic: Mental Status Exam:  Level of Alertness:   awake  Cranial Nerves:  cranial nerves II-XII are grossly intact  Motor Exam:  moves all extremities well and symmetrically  Sensory:  Sensory intact  Coordination:  Finger/Nose:  Right:  Normal  Left:  Normal  Speech --mild expressive aphasia  Neuropsychiatric: General: normal, calm and normal eye contact    Data   Data reviewed today: I reviewed all medications, new labs and imaging results over the last 24 hours. I personally reviewed the head CT image(s) showing old RCVA, neck ct shows ulcerative LICA, MRI pos CVA and the brain  MRI image(s) showing cva.    Recent Labs   Lab 11/03/21  1218 11/03/21  1217 11/03/21  1207 11/03/21  1154   WBC 10.8  --   --   --    HGB 16.3  --   --   --    MCV 83  --   --   --      --   --   --    INR  --  1.03  --   --    *  --   --   --    POTASSIUM 4.3  --   --   --    CHLORIDE 97*  --   --   --    CO2 26  --   --   --    BUN 17  --   --   --    CR 1.30  --  1.1  --    ANIONGAP 10  --   --   --    GLENN 9.6  --   --   --    *  --   --  329*     Recent Results (from the past 24 hour(s))   CTA Head Neck with Contrast    Narrative    EXAM: CTA  HEAD NECK WITH CONTRAST  LOCATION: [facility]  DATE/TIME: 11/3/2021 12:01 PM    INDICATION: Code Stroke to evaluate for potential thrombolysis and thrombectomy.  PLEASE READ IMMEDIATELY.  COMPARISON: CTA head/neck 06/27/2021.  CONTRAST: [id number] 75ml  TECHNIQUE: Head and neck CT angiogram with IV contrast. Noncontrast head CT followed by axial helical CT images of the head and neck vessels obtained during the arterial phase of intravenous contrast administration. Axial 2D reconstructed images and   multiplanar 3D MIP reconstructed images of the head and neck vessels were performed by the technologist. Dose reduction techniques were used. All stenosis measurements made according to NASCET criteria unless otherwise specified.    FINDINGS:   NONCONTRAST HEAD CT:   INTRACRANIAL CONTENTS: There is no evidence of acute intracranial hemorrhage, acute large territorial infarction, or mass lesion. Encephalomalacia in the posterior right frontal lobe and anterior right parietal lobe appears unchanged from the prior   study. Moderate patchy nonspecific hypodensity in the cerebral white matter appears stable from the prior study.    Mild prominence of the ventricles, sulci, and cisterns consistent with mild diffuse brain parenchymal volume loss appears stable from the prior study. There is no evidence of hydrocephalus.    VISUALIZED  ORBITS/SINUSES/MASTOIDS: No intraorbital abnormality. No paranasal sinus mucosal disease. No middle ear or mastoid effusion.    BONES/SOFT TISSUES: No acute abnormality.    HEAD CTA:    There is no evidence of proximal large vessel occlusion, cerebral aneurysm, high flow vascular malformation, or significant intracranial arterial stenosis.    The major dural venous sinuses are widely patent.    NECK CTA:    There is a two-vessel, left-sided aortic arch with common origin of the brachiocephalic and left common carotid arteries, a normal congenital variant. There is no significant stenosis of the brachiocephalic or subclavian arteries bilaterally.    There is no significant stenosis of the right common carotid artery. There is predominantly noncalcified plaque at the right carotid bifurcation resulting in unchanged mild stenosis in the proximal cervical right internal carotid artery (approximately   20%).    There is no significant stenosis of the left common carotid artery. Extensive calcified and noncalcified atherosclerotic plaque in the proximal cervical left internal carotid artery results in moderate stenosis in the proximal cervical left internal   carotid artery (approximately 60%). Ulcerated plaque is seen along the dorsal aspect of the proximal cervical left internal carotid artery. The findings have progressed since the prior head and neck CTA dated 06/27/2021.    The vertebral arteries are codominant. There is unchanged mild stenosis at the origin of the right vertebral artery. The remainder of the extradural right vertebral artery is widely patent. There is no significant stenosis of the extradural left   vertebral artery. There is no evidence of vertebral artery dissection.    The visualized lungs are clear bilaterally. The thyroid gland is unremarkable. There is stable multilevel degenerative change in the cervical spine.      Impression    IMPRESSION:     HEAD CT:  1.  No CT evidence of acute  intercranial hemorrhage, acute large territorial infarction, or mass lesion.  2.  Stable chronic right MCA territory infarct involving the posterior right frontal and anterior right parietal lobes.  3.  Stable moderate chronic small vessel ischemic change and mild diffuse brain parenchymal volume loss.    HEAD CTA:   1.  No proximal large vessel occlusion.  2.  No evidence of cerebral aneurysm, high flow vascular malformation, or significant intracranial arterial stenosis.    NECK CTA:  1.  Ulcerated atherosclerotic plaque in the proximal cervical left internal carotid artery results in moderate stenosis (60%). The degree of stenosis has increased since the prior head and neck CTA dated 06/27/2021.  2.  Stable mild stenosis in the proximal cervical right internal carotid artery (approximately 20%).  3.  Stable mild stenosis at the origin of the right vertebral artery.    The above findings were discussed with Dr. Brayden stover at: 12:09 PM (noncontrast head CT) and 12:30 PM (CTA head/neck) on 11/3/2021.    MR Brain w/o Contrast    Narrative    EXAM: MR BRAIN W/O CONTRAST  LOCATION: [facility]  DATE/TIME: 11/3/2021 1:20 PM    INDICATION: Weakness. Stroke evaluation.  COMPARISON: CTA of the head and neck dated 11/03/2021  TECHNIQUE: Routine multiplanar multisequence head MRI without intravenous contrast.    FINDINGS:  INTRACRANIAL CONTENTS: Foci of acute ischemia in the left subinsular region and left periventricular corona radiata superimposed upon a background of moderate chronic microangiopathy and chronic infarct in the right frontoparietal lobe No mass, acute   hemorrhage, or extra-axial fluid collections. Mild generalized cerebral volume loss without hydrocephalus. Normal position of the cerebellar tonsils.     SELLA: No abnormality accounting for technique.    OSSEOUS STRUCTURES/SOFT TISSUES: Normal marrow signal. The major intracranial vascular flow voids are maintained.      ORBITS: No abnormality accounting for technique.     SINUSES/MASTOIDS: No paranasal sinus mucosal disease. No middle ear or mastoid effusion.       Impression    IMPRESSION:    1.  Foci of acute ischemia in the left subinsular region and left periventricular corona radiata superimposed upon a background of moderate chronic microangiopathy and chronic infarct in the right frontoparietal lobe.    - - - - - - - - - - - - - - - - - - - - - - - - - - - - - - - - - - - - - - - - - - - - - - - - - - - - - - - - - - - - - - - - - - - - - - - - - - - -   The results above were discussed with Dr. Haq on 11/3/2021 1:58 PM by Dr. Fabian Garcia.  - - - - - - - - - - - - - - - - - - - - - - - - - - - - - - - - - - - - - - - - - - - - - - - - - - - - - - - - - - - - - - - - - - - - - - - - - - - -

## 2021-11-03 NOTE — CONSULTS
"Care Management Initial Consult    General Information  Assessment completed with: Children, son Les in room with patient at this time  Type of CM/SW Visit: Initial Assessment    Primary Care Provider verified and updated as needed: Yes   Readmission within the last 30 days: no previous admission in last 30 days      Reason for Consult: discharge planning  Advance Care Planning: Advance Care Planning Reviewed: other (comment) (\"doesn't have one\")          Communication Assessment  Patient's communication style: spoken language (English or Bilingual)             Cognitive  Cognitive/Neuro/Behavioral:  (EEG in progress so did not talk to patient, talked to son)                      Living Environment:   People in home: alone     Current living Arrangements: other (see comments) (\"lives in a room in a shop\")      Able to return to prior arrangements: other (see comments) (unknown at this time)       Family/Social Support:  Care provided by: self  Provides care for: no one  Marital Status:   Children          Description of Support System: Supportive, Involved    Support Assessment: Adequate family and caregiver support, Adequate social supports    Current Resources:   Patient receiving home care services: No     Community Resources: None  Equipment currently used at home: none  Supplies currently used at home: Other (\"1 false tooth\")    Employment/Financial:  Employment Status: employed full-time     Employment/ Comments: \"no  benefits\"  Financial Concerns:     Referral to Financial Counselor: No       Lifestyle & Psychosocial Needs:  Social Determinants of Health     Tobacco Use: Low Risk      Smoking Tobacco Use: Never Smoker     Smokeless Tobacco Use: Never Used   Alcohol Use:      Frequency of Alcohol Consumption:      Average Number of Drinks:      Frequency of Binge Drinking:    Financial Resource Strain:      Difficulty of Paying Living Expenses:    Food Insecurity:      Worried " "About Running Out of Food in the Last Year:      Ran Out of Food in the Last Year:    Transportation Needs:      Lack of Transportation (Medical):      Lack of Transportation (Non-Medical):    Physical Activity:      Days of Exercise per Week:      Minutes of Exercise per Session:    Stress:      Feeling of Stress :    Social Connections:      Frequency of Communication with Friends and Family:      Frequency of Social Gatherings with Friends and Family:      Attends Shinto Services:      Active Member of Clubs or Organizations:      Attends Club or Organization Meetings:      Marital Status:    Intimate Partner Violence:      Fear of Current or Ex-Partner:      Emotionally Abused:      Physically Abused:      Sexually Abused:    Depression: Not at risk     PHQ-2 Score: 0   Housing Stability:      Unable to Pay for Housing in the Last Year:      Number of Places Lived in the Last Year:      Unstable Housing in the Last Year:        Functional Status:  Prior to admission patient needed assistance:   Dependent ADLs:: Independent  Dependent IADLs:: Transportation (\"has not driven since 10/2020\")  Assesssment of Functional Status: Not at  functional baseline    Mental Health Status:          Chemical Dependency Status:                Values/Beliefs:  Spiritual, Cultural Beliefs, Shinto Practices, Values that affect care:                 Additional Information:  Jorge lives in a 'room in a shop' alone. He is independent with all ADLs except driving. Family helps with transportation.    Unknown discharge needs at this time.    Alexus Lui RN      "

## 2021-11-03 NOTE — ED PROVIDER NOTES
EMERGENCY DEPARTMENT ENCOUNTER      NAME: Jorge Young  AGE: 66 year old male  YOB: 1955  MRN: [id number]  EVALUATION DATE & TIME: No admission date for patient encounter.    PCP: Ashley Narvaez    ED PROVIDER: Brayden Haq M.D.      Chief Complaint   Patient presents with     Aphasia     Tier 2 Stroke code       FINAL IMPRESSION:  1. Acute CVA (cerebrovascular accident) (H)    2. Atherosclerosis of left carotid artery        ED COURSE & MEDICAL DECISION MAKIN year old male presents to the Emergency Department for evaluation of expressive aphasia.  Patient last known well was early this morning at around 630 more than 5 hours prior to arrival in the emergency department.  He has evident expressive aphasia but no other appreciable deficits on exam.  I met the patient in triage after getting a call from his clinic and initiated a tier 2 stroke code.  Patient was expedited to CT which was negative for intracranial hemorrhage or other acute abnormality.  CTA does show an ulcerated plaque at the left internal carotid with about 60% stenosis.  No other large vessel occlusions or intervenable lesions.  Patient then taken to MRI without contrast which confirms 2 small foci of infarct on the left side.  Concern would be for carotid emboli.  Discussed case with stroke neurology team.  Transitioned care to hospitalist Dr. Auguste.  She is going to contact vascular surgery and discussed their preferences for anticoagulation.  Reviewed remainder of labs and EKG.  Patient will be admitted to neuro telemetry for ongoing monitoring and work-up.    11:49 AM I met the patient and performed my initial interview and exam. I saw the patient out in triage to perform a neurological assessment.  11:52 AM I called a tier 2 stroke code.  11:58 AM I spoke with EUNICE Giraldo with [facility], regarding patient plan of care.   12:43 PM I spoke with EUNICE Giraldo with [facility], we  discussed patient plan of care.  12:44 PM Stroke code was cancelled.   1:19 PM I spoke with Dr. Auguste, hospitalist, regarding patient admission.     Critical Care  Performed by: Brayden Haq MD  Authorized by: Brayden Haq MD     Total critical care time: 50 minutes  Critical care time was exclusive of separately billable procedures and treating other patients.  Critical care was necessary to treat or prevent imminent or life-threatening deterioration of the following conditions: Acute stroke presenting less than 12 hours, stroke code activation  Critical care was time spent personally by me on the following activities: development of treatment plan with patient or surrogate, discussions with consultants, examination of patient, evaluation of patient's response to treatment, obtaining history from patient or surrogate, ordering and performing treatments and interventions, ordering and review of laboratory studies, ordering and review of radiographic studies and re-evaluation of patient's condition, this excludes any separately billable procedures.      MEDICATIONS GIVEN IN THE EMERGENCY:  Medications   iopamidol (ISOVUE-370) solution 75 mL (75 mLs Intravenous Given 11/3/21 1226)       NEW PRESCRIPTIONS STARTED AT TODAY'S ER VISIT  New Prescriptions    No medications on file          =================================================================    HPI    Patient information was obtained from: patient, son    Use of : N/A         Jorge KUMAR Hector is a 66 year old male with a pertinent history of stroke, seizure, T2DM, hypertension, hyperlipidemia who presents to this ED by walk in for evaluation of speech difficulty, confusion.    Patient's son reports that he spoke with the patient yesterday who was having some abdominal pain around 1530. This morning, he called the patient at 0620 and the patient had normal speech. At 0730 he picked up the patient and the patient was confused and not able to  speak clearly. He says that the patient has been in and out of confusion all day. He took the patient back home and he slept a while, then they took him into the clinic. Clinic sent the patient into the ED for concerns of stroke. Patient reports difficulty speaking today and says that he had some intermittent confusion yesterday afternoon as well. He was vomiting yesterday and today and notes some mild abdominal pain. Patient lives in an apartment by himself. Patient denies headaches, neck pain, numbness, weakness, or any other complaints a this time.     REVIEW OF SYSTEMS   All systems reviewed and negative except as noted in HPI.    PAST MEDICAL HISTORY:  Past Medical History:   Diagnosis Date     Benign essential hypertension      COVID-19 virus infection      Diabetes mellitus (H)      Hyperlipidemia LDL goal <100      Left facial numbness 07/16/2020     Left hand weakness 07/16/2020     Stroke (H)        PAST SURGICAL HISTORY:  History reviewed. No pertinent surgical history.        CURRENT MEDICATIONS:    No current facility-administered medications for this encounter.     Current Outpatient Medications   Medication     aspirin (ASA) 81 MG EC tablet     atorvastatin (LIPITOR) 80 MG tablet     glipiZIDE (GLUCOTROL XL) 10 MG 24 hr tablet     hydrochlorothiazide (HYDRODIURIL) 50 MG tablet     levETIRAcetam (KEPPRA) 750 MG tablet     losartan (COZAAR) 50 MG tablet     multivitamin w/minerals (THERA-VIT-M) tablet     pioglitazone (ACTOS) 30 MG tablet     polyethylene glycol-propylene glycol (SYSTANE ULTRA) 0.4-0.3 % SOLN ophthalmic solution     ACCU-CHEK GUIDE test strip     blood glucose monitoring (SOFTCLIX) lancets     Blood Glucose Monitoring Suppl (ACCU-CHEK GUIDE ME) w/Device KIT     Continuous Blood Gluc  (FREESTYLE MICHELLE READER) MICHAEL     Continuous Blood Gluc Sensor (FREESTYLE MICHELLE 14 DAY SENSOR) MISC         ALLERGIES:  Allergies   Allergen Reactions     Novocain [Procaine] Other (See Comments)      Tiredness         FAMILY HISTORY:  History reviewed. No pertinent family history.    SOCIAL HISTORY:   Social History     Socioeconomic History     Marital status: Legally      Spouse name: Not on file     Number of children: Not on file     Years of education: Not on file     Highest education level: Not on file   Occupational History     Not on file   Tobacco Use     Smoking status: Never Smoker     Smokeless tobacco: Never Used   Substance and Sexual Activity     Alcohol use: Not on file     Drug use: Not on file     Sexual activity: Not on file   Other Topics Concern     Not on file   Social History Narrative     Not on file     Social Determinants of Health     Financial Resource Strain:      Difficulty of Paying Living Expenses:    Food Insecurity:      Worried About Running Out of Food in the Last Year:      Ran Out of Food in the Last Year:    Transportation Needs:      Lack of Transportation (Medical):      Lack of Transportation (Non-Medical):    Physical Activity:      Days of Exercise per Week:      Minutes of Exercise per Session:    Stress:      Feeling of Stress :    Social Connections:      Frequency of Communication with Friends and Family:      Frequency of Social Gatherings with Friends and Family:      Attends Sabianism Services:      Active Member of Clubs or Organizations:      Attends Club or Organization Meetings:      Marital Status:    Intimate Partner Violence:      Fear of Current or Ex-Partner:      Emotionally Abused:      Physically Abused:      Sexually Abused:        VITALS:  BP (!) 190/107   Pulse 79   Temp 99.5  F (37.5  C) (Oral)   Resp 11   Wt 89.8 kg (198 lb)   SpO2 98%   BMI 29.24 kg/m      PHYSICAL EXAM    Constitutional: Well developed, Well nourished, NAD.  HENT: Normocephalic, Atraumatic. Neck Supple.  Eyes: EOMI, Conjunctiva normal.  Respiratory: Breathing comfortably on room air. Speaks full sentences easily. Lungs clear to ascultation.  Cardiovascular:  Normal heart rate, Regular rhythm. No peripheral edema.  Abdomen: Soft  Musculoskeletal: Good range of motion in all major joints. No major deformities noted.  Integument: Warm, Dry.  Neurologic: Awake, alert, oriented.  Moderate to severe expressive aphasia, able to speak simple words but no complex phrases.  Face is symmetric.  Cranial nerves II through XII grossly intact.  Strength is 5 out of 5 throughout bilateral upper and lower extremities.  Sensation light touch intact x4.  Psychiatric: Cooperative. Affect appropriate.    LAB:  All pertinent labs reviewed and interpreted.  Labs Ordered and Resulted from Time of ED Arrival to Time of ED Departure   BASIC METABOLIC PANEL - Abnormal       Result Value    Sodium 133 (*)     Potassium 4.3      Chloride 97 (*)     Carbon Dioxide (CO2) 26      Anion Gap 10      Urea Nitrogen 17      Creatinine 1.30      Calcium 9.6      Glucose 329 (*)     GFR Estimate 57 (*)    CBC WITH PLATELETS AND DIFFERENTIAL - Abnormal    WBC Count 10.8      RBC Count 5.95 (*)     Hemoglobin 16.3      Hematocrit 49.5      MCV 83      MCH 27.4      MCHC 32.9      RDW 13.2      Platelet Count 219      % Neutrophils 88      % Lymphocytes 7      % Monocytes 4      % Eosinophils 0      % Basophils 0      % Immature Granulocytes 1      NRBCs per 100 WBC 0      Absolute Neutrophils 9.6 (*)     Absolute Lymphocytes 0.7 (*)     Absolute Monocytes 0.4      Absolute Eosinophils 0.0      Absolute Basophils 0.0      Absolute Immature Granulocytes 0.1 (*)     Absolute NRBCs 0.0     GLUCOSE BY METER - Abnormal    GLUCOSE BY METER POCT 329 (*)    INR - Normal    INR 1.03     PARTIAL THROMBOPLASTIN TIME - Normal    aPTT 26     TROPONIN I - Normal    Troponin I 0.01     ISTAT CREATININE POCT - Normal    Creatinine POCT 1.1      GFR, ESTIMATED POCT >60     GLUCOSE MONITOR NURSING POCT       RADIOLOGY:  Reviewed all pertinent imaging. Please see official radiology report.  MR Brain w/o Contrast   Final Result    IMPRESSION:      1.  Foci of acute ischemia in the left subinsular region and left periventricular corona radiata superimposed upon a background of moderate chronic microangiopathy and chronic infarct in the right frontoparietal lobe.      - - - - - - - - - - - - - - - - - - - - - - - - - - - - - - - - - - - - - - - - - - - - - - - - - - - - - - - - - - - - - - - - - - - - - - - - - - - -    The results above were discussed with Dr. Haq on 11/3/2021 1:58 PM by Dr. Fabian Garcia.   - - - - - - - - - - - - - - - - - - - - - - - - - - - - - - - - - - - - - - - - - - - - - - - - - - - - - - - - - - - - - - - - - - - - - - - - - - - -      CTA Head Neck with Contrast   Final Result   IMPRESSION:       HEAD CT:   1.  No CT evidence of acute intercranial hemorrhage, acute large territorial infarction, or mass lesion.   2.  Stable chronic right MCA territory infarct involving the posterior right frontal and anterior right parietal lobes.   3.  Stable moderate chronic small vessel ischemic change and mild diffuse brain parenchymal volume loss.      HEAD CTA:    1.  No proximal large vessel occlusion.   2.  No evidence of cerebral aneurysm, high flow vascular malformation, or significant intracranial arterial stenosis.      NECK CTA:   1.  Ulcerated atherosclerotic plaque in the proximal cervical left internal carotid artery results in moderate stenosis (60%). The degree of stenosis has increased since the prior head and neck CTA dated 06/27/2021.   2.  Stable mild stenosis in the proximal cervical right internal carotid artery (approximately 20%).   3.  Stable mild stenosis at the origin of the right vertebral artery.      The above findings were discussed with Dr. Brayden stvoer at: 12:09 PM (noncontrast head CT) and 12:30 PM (CTA head/neck) on 11/3/2021.       US Carotid Bilateral    (Results Pending)       EKG:    Performed at: 1241    Impression: Sinus rhythm. Nonspecific T wave abnormality. Abnormal ECG.      Rate: 82  Rhythm: sinus  Axis: 56  ME Interval: 172  QRS Interval: 76  QTc Interval: 455  ST Changes: No ST elevations or depressions  Comparison: compared with 9/18/19, QT has lengthened.     I have independently reviewed and interpreted the EKG(s) documented above.    PROCEDURES:   None      I, Bjorn Kim, am serving as a scribe to document services personally performed by Dr. Brayden Haq, based on my observation and the provider's statements to me. I, Brayden Haq MD attest that Bjorn Kim is acting in a scribe capacity, has observed my performance of the services and has documented them in accordance with my direction.    Brayden Haq M.D.  Emergency Medicine  [facility] EMERGENCY DEPARTMENT  [address] [place] [id number] [phone]  Dept: [phone]     Brayden Haq MD  11/03/21 2591

## 2021-11-03 NOTE — PROGRESS NOTES
Bedside EEG was performed that included photic, auditory, and sensory stimulation. Hyperventilation was not possible given the patient's clinical state. Skin is intact.  EEG # .  EEG [id number] system used.

## 2021-11-03 NOTE — PHARMACY-ADMISSION MEDICATION HISTORY
Pharmacy Note - Admission Medication History    Pertinent Provider Information: pt is fairly confident he took his Keppra this morning, but not completely sure he took his medications last evening.     ______________________________________________________________________    Prior To Admission (PTA) med list completed and updated in EMR.       PTA Med List   Medication Sig Last Dose     aspirin (ASA) 81 MG EC tablet Take 1 tablet (81 mg) by mouth daily 11/2/2021 at pm     atorvastatin (LIPITOR) 80 MG tablet Take 1 tablet (80 mg) by mouth daily 11/2/2021 at pm     glipiZIDE (GLUCOTROL XL) 10 MG 24 hr tablet Take 1 tablet (10 mg) by mouth daily 11/2/2021 at pm     hydrochlorothiazide (HYDRODIURIL) 50 MG tablet Take 1 tablet (50 mg) by mouth daily 11/2/2021 at pm     levETIRAcetam (KEPPRA) 750 MG tablet Take 750 mg by mouth 2 times daily 11/3/2021 at am     losartan (COZAAR) 50 MG tablet Take 1 tablet (50 mg) by mouth daily 11/2/2021 at pm     multivitamin w/minerals (THERA-VIT-M) tablet Take 1 tablet by mouth daily 11/2/2021 at Unknown time     pioglitazone (ACTOS) 30 MG tablet Take one tablet daily by mouth. 11/2/2021 at pm     polyethylene glycol-propylene glycol (SYSTANE ULTRA) 0.4-0.3 % SOLN ophthalmic solution Place 1 drop into both eyes every hour as needed for dry eyes Unknown at prn       Information source(s): Patient, Family member and CareEverywhere[facility]  Method of interview communication: in-person    Summary of Changes to PTA Med List  New: Keppra  Discontinued: none  Changed: none    Patient was asked about OTC/herbal products specifically.  PTA med list reflects this.    In the past week, patient estimated taking medication this percent of the time:  greater than 90%.    Allergies were reviewed, assessed, and updated with the patient.      Patient does not anticipate needing any multi-use medications during admission.    The information provided in this note is only as accurate as the sources  available at the time of the update(s).    Thank you for the opportunity to participate in the care of this patient.    Alexandria Linda, PharmSTACY  11/3/2021 1:59 PM

## 2021-11-03 NOTE — CONSULTS
VASCULAR SURGERY HISTORY & PHYSICAL    Jorge Young  medical Record #:  [id number]  YOB: 1955  Age:  66 year old     Date of Service: 11/3/2021    PRIMARY CARE PROVIDER: Ashley Narvaez      Reason for consult: TIA, symptomatic carotid disease    IMPRESSION:  Symptomatic left internal carotid artery stenosis with ulceration    RECOMMENDATION/RISKS:    - continue dual antiplatelet therapy with aspirin 81mg and plavix 75mg daily; recommend loading dose of plavix today (wrote for additional 225mg)  - left carotid endarterectomy tomorrow 11/4  - NPO at midnight    - as an outpatient, he should get best medical therapy to include glucose control with goal HgbA1c <6, high intensity statin (already on atorvastatin 80mg) for life with goal LDL <70, hypertension control with goal SBP <130      HPI:  Jorge Young is a 66 year old male who was seen today in consultation for symptomatic left internal carotid artery stenosis. He presented to the ED yesterday after two separate hours long episodes of aphasia. He woke up yesterday at 0530 and could not speak, went back to sleep a few hours later, and then woke up again being able to speak. A few hours later he could not talk and then presented to the ED. He is unsure exactly how long each episode of aphasia lasted. He reports having a similar episode of difficulty speaking over the summer that resolved. Since presentation, he has been evaluated for a stroke with a CT head/neck and MRI notable for small ischemic foci in the left hemisphere.     Notably, he presented in June with confusion and stroke workup at that time was negative. He had a previous stroke in 2020 that presented with left sided weakness and facial asymmetry. He has no residual deficits but is on seizure prophylaxis with keppra since that time. Holter monitor in late 2020 was negative for dysrrhythmia.     He has never used tobacco products and does not drink alcohol. Does not use illicit  drugs. He takes aspirin 81mg and atorvastatin 80mg. His diabetes is not controlled with HgbA1c 9.4. His LDL is 118. Creatinine 1.3 and GFR 57.     Spoke with daughter Bailey at [phone] about the diagnosis of symptomatic left internal carotid artery stenosis and plan for upcoming surgery. Questions answered. She plans to be at the hospital tomorrow for surgery.    REVIEW OF SYSTEMS:    A 12 point ROS was reviewed and is negative except for cough, weight gain of about 20lbs over the past 6 months, and vomiting this morning. He specifically denies chest pain, facial asymmetry, unilateral weakness or numbness, shortness of breath, lower extremity claudication, abdominal pain, diarrhea, constipation, dysuria.    PHH:    Past Medical History:   Diagnosis Date     Benign essential hypertension      COVID-19 virus infection      Diabetes mellitus (H)      Hyperlipidemia LDL goal <100      Left facial numbness 07/16/2020     Left hand weakness 07/16/2020     Stroke (H)         History reviewed. No pertinent surgical history.    ALLERGIES:  Novocain [procaine]    MEDS:    Current Facility-Administered Medications:      acetaminophen (TYLENOL) tablet 650 mg, 650 mg, Oral, Q4H PRN, Brenda Auguste MD     aspirin EC tablet 81 mg, 81 mg, Oral, Daily **OR** aspirin (ASA) chewable tablet 81 mg, 81 mg, Oral or NG Tube, Daily, Brenda Auguste MD, 81 mg at 11/03/21 1433     atorvastatin (LIPITOR) tablet 80 mg, 80 mg, Oral, QPM, Brenda Auguste MD     clopidogrel (PLAVIX) tablet 75 mg, 75 mg, Oral or NG Tube, Daily, Brenda Auguste MD, 75 mg at 11/03/21 1434     heparin ANTICOAGULANT injection 5,000 Units, 5,000 Units, Subcutaneous, Q12H, Brenda Auguste MD     levETIRAcetam (KEPPRA) tablet 750 mg, 750 mg, Oral, BID, Brenda Auguste MD     lidocaine (LMX4) cream, , Topical, Q1H PRN, Brenda Auguste MD     lidocaine 1 % 0.1-1 mL, 0.1-1 mL, Other, Q1H PRN, Brenda Auguste MD     medication instruction  - No oral meds if patient didn't pass dysphagia screen, , Does not apply, Continuous PRN, Brenda Auguste MD     Medication Instructions - Avoid dextrose in IV solutions., , Intravenous, Continuous PRN, Brenda Auguste MD     ondansetron (ZOFRAN-ODT) ODT tab 4 mg, 4 mg, Oral, Q6H PRN **OR** ondansetron (ZOFRAN) injection 4 mg, 4 mg, Intravenous, Q6H PRN, Brenda Auguste MD     sodium chloride (PF) 0.9% PF flush 3 mL, 3 mL, Intracatheter, Q8H, Brenda Auguste MD     sodium chloride (PF) 0.9% PF flush 3 mL, 3 mL, Intracatheter, q1 min prn, Brenda Auguste MD     sodium chloride 0.9% infusion, , Intravenous, Continuous PRN, Brenda Auguste MD    Current Outpatient Medications:      aspirin (ASA) 81 MG EC tablet, Take 1 tablet (81 mg) by mouth daily, Disp: 100 tablet, Rfl: 3     atorvastatin (LIPITOR) 80 MG tablet, Take 1 tablet (80 mg) by mouth daily, Disp: 90 tablet, Rfl: 3     glipiZIDE (GLUCOTROL XL) 10 MG 24 hr tablet, Take 1 tablet (10 mg) by mouth daily, Disp: 90 tablet, Rfl: 3     hydrochlorothiazide (HYDRODIURIL) 50 MG tablet, Take 1 tablet (50 mg) by mouth daily, Disp: 90 tablet, Rfl: 3     levETIRAcetam (KEPPRA) 750 MG tablet, Take 750 mg by mouth 2 times daily, Disp: , Rfl:      losartan (COZAAR) 50 MG tablet, Take 1 tablet (50 mg) by mouth daily, Disp: 90 tablet, Rfl: 3     multivitamin w/minerals (THERA-VIT-M) tablet, Take 1 tablet by mouth daily, Disp: , Rfl:      pioglitazone (ACTOS) 30 MG tablet, Take one tablet daily by mouth., Disp: 30 tablet, Rfl: 1     polyethylene glycol-propylene glycol (SYSTANE ULTRA) 0.4-0.3 % SOLN ophthalmic solution, Place 1 drop into both eyes every hour as needed for dry eyes, Disp: , Rfl:      ACCU-CHEK GUIDE test strip, Use to test blood sugar 1 time daily., Disp: 50 strip, Rfl: 11     blood glucose monitoring (SOFTCLIX) lancets, Use to test blood sugar 1 time daily., Disp: 100 each, Rfl: 4     Blood Glucose Monitoring Suppl (ACCU-CHEK GUIDE ME)  w/Device KIT, 1 each daily, Disp: 1 kit, Rfl: 0     Continuous Blood Gluc  (FREESTYLE MICHELLE READER) MICHAEL, 1 each every 8 hours Use reader to scan sensor a minimum of every 8 hours. (Patient not taking: Reported on 11/3/2021), Disp: 1 each, Rfl: 0     Continuous Blood Gluc Sensor (FREESTYLE MICHELLE 14 DAY SENSOR) MISC, 1 each every 14 days Change every 14 days. (Patient not taking: Reported on 11/3/2021), Disp: 2 each, Rfl: 5    SOCIAL HABITS:    History   Smoking Status     Never Smoker   Smokeless Tobacco     Never Used     Social History    Substance and Sexual Activity      Alcohol use: Not on file      History   Drug Use Not on file       FAMILY HISTORY:  History reviewed. No pertinent family history.  Strokes in his mother who is still alive  Does not know any medical history on his father      PE:  BP (!) 190/107   Pulse 79   Temp 99.5  F (37.5  C) (Oral)   Resp 11   Wt 198 lb (89.8 kg)   SpO2 98%   BMI 29.24 kg/m    Wt Readings from Last 1 Encounters:   11/03/21 198 lb (89.8 kg)     Body mass index is 29.24 kg/m .    EXAM:  GENERAL: This is a well-developed 66 year old male who appears his stated age  Head: face symmetric  EYES: EOMI, pupils equal  MOUTH: Buccal mucosa normal, voice quality normal  Neck: no carotid bruit, full neck range of motion, no cervical lymphadenopathy  CARDIAC: RRR, no m/r/g  CHEST/LUNG: Clear to auscultation bilaterally  GASTROINTESINAL abd soft, nontender, nondistended, RLQ surgical scar  MUSCULOSKELETAL: Grossly normal and both lower extremities are intact.  HEME/LYMPH: No lymphedema  NEUROLOGIC: Focally intact, Alert and oriented x 3.   PSYCH: appropriate affect  INTEGUMENT: No open lesions or ulcers  Pulse Exam: 2+ radial pulses bilaterally, 2+ femoral pulses bilaterally, 1+ DP pulses bilaterally      DIAGNOSTIC STUDIES:     Images:  MR Brain w/o Contrast    Result Date: 11/3/2021  EXAM: MR BRAIN W/O CONTRAST LOCATION: [facility] DATE/TIME:  11/3/2021 1:20 PM INDICATION: Weakness. Stroke evaluation. COMPARISON: CTA of the head and neck dated 11/03/2021 TECHNIQUE: Routine multiplanar multisequence head MRI without intravenous contrast. FINDINGS: INTRACRANIAL CONTENTS: Foci of acute ischemia in the left subinsular region and left periventricular corona radiata superimposed upon a background of moderate chronic microangiopathy and chronic infarct in the right frontoparietal lobe No mass, acute hemorrhage, or extra-axial fluid collections. Mild generalized cerebral volume loss without hydrocephalus. Normal position of the cerebellar tonsils. SELLA: No abnormality accounting for technique. OSSEOUS STRUCTURES/SOFT TISSUES: Normal marrow signal. The major intracranial vascular flow voids are maintained. ORBITS: No abnormality accounting for technique. SINUSES/MASTOIDS: No paranasal sinus mucosal disease. No middle ear or mastoid effusion.     IMPRESSION: 1.  Foci of acute ischemia in the left subinsular region and left periventricular corona radiata superimposed upon a background of moderate chronic microangiopathy and chronic infarct in the right frontoparietal lobe. - - - - - - - - - - - - - - - - - - - - - - - - - - - - - - - - - - - - - - - - - - - - - - - - - - - - - - - - - - - - - - - - - - - - - - - - - - - - The results above were discussed with Dr. Haq on 11/3/2021 1:58 PM by Dr. Fabian Garcia. - - - - - - - - - - - - - - - - - - - - - - - - - - - - - - - - - - - - - - - - - - - - - - - - - - - - - - - - - - - - - - - - - - - - - - - - - - - -    CTA Head Neck with Contrast    Result Date: 11/3/2021  EXAM: CTA  HEAD NECK WITH CONTRAST LOCATION: [facility] DATE/TIME: 11/3/2021 12:01 PM INDICATION: Code Stroke to evaluate for potential thrombolysis and thrombectomy.  PLEASE READ IMMEDIATELY. COMPARISON: CTA head/neck 06/27/2021. CONTRAST: [id number] 75ml TECHNIQUE: Head and neck CT angiogram with IV contrast. Noncontrast  head CT followed by axial helical CT images of the head and neck vessels obtained during the arterial phase of intravenous contrast administration. Axial 2D reconstructed images and multiplanar 3D MIP reconstructed images of the head and neck vessels were performed by the technologist. Dose reduction techniques were used. All stenosis measurements made according to NASCET criteria unless otherwise specified. FINDINGS: NONCONTRAST HEAD CT: INTRACRANIAL CONTENTS: There is no evidence of acute intracranial hemorrhage, acute large territorial infarction, or mass lesion. Encephalomalacia in the posterior right frontal lobe and anterior right parietal lobe appears unchanged from the prior study. Moderate patchy nonspecific hypodensity in the cerebral white matter appears stable from the prior study. Mild prominence of the ventricles, sulci, and cisterns consistent with mild diffuse brain parenchymal volume loss appears stable from the prior study. There is no evidence of hydrocephalus. VISUALIZED ORBITS/SINUSES/MASTOIDS: No intraorbital abnormality. No paranasal sinus mucosal disease. No middle ear or mastoid effusion. BONES/SOFT TISSUES: No acute abnormality. HEAD CTA: There is no evidence of proximal large vessel occlusion, cerebral aneurysm, high flow vascular malformation, or significant intracranial arterial stenosis. The major dural venous sinuses are widely patent. NECK CTA: There is a two-vessel, left-sided aortic arch with common origin of the brachiocephalic and left common carotid arteries, a normal congenital variant. There is no significant stenosis of the brachiocephalic or subclavian arteries bilaterally. There is no significant stenosis of the right common carotid artery. There is predominantly noncalcified plaque at the right carotid bifurcation resulting in unchanged mild stenosis in the proximal cervical right internal carotid artery (approximately 20%). There is no significant stenosis of the left  common carotid artery. Extensive calcified and noncalcified atherosclerotic plaque in the proximal cervical left internal carotid artery results in moderate stenosis in the proximal cervical left internal carotid artery (approximately 60%). Ulcerated plaque is seen along the dorsal aspect of the proximal cervical left internal carotid artery. The findings have progressed since the prior head and neck CTA dated 06/27/2021. The vertebral arteries are codominant. There is unchanged mild stenosis at the origin of the right vertebral artery. The remainder of the extradural right vertebral artery is widely patent. There is no significant stenosis of the extradural left vertebral artery. There is no evidence of vertebral artery dissection. The visualized lungs are clear bilaterally. The thyroid gland is unremarkable. There is stable multilevel degenerative change in the cervical spine.     IMPRESSION: HEAD CT: 1.  No CT evidence of acute intercranial hemorrhage, acute large territorial infarction, or mass lesion. 2.  Stable chronic right MCA territory infarct involving the posterior right frontal and anterior right parietal lobes. 3.  Stable moderate chronic small vessel ischemic change and mild diffuse brain parenchymal volume loss. HEAD CTA: 1.  No proximal large vessel occlusion. 2.  No evidence of cerebral aneurysm, high flow vascular malformation, or significant intracranial arterial stenosis. NECK CTA: 1.  Ulcerated atherosclerotic plaque in the proximal cervical left internal carotid artery results in moderate stenosis (60%). The degree of stenosis has increased since the prior head and neck CTA dated 06/27/2021. 2.  Stable mild stenosis in the proximal cervical right internal carotid artery (approximately 20%). 3.  Stable mild stenosis at the origin of the right vertebral artery. The above findings were discussed with Dr. Brayden stover at: 12:09 PM (noncontrast head CT) and 12:30 PM (CTA head/neck) on  11/3/2021.       I personally reviewed the images and my interpretation is left sided focal ischemic areas on the MRI, CTA neck with new (compared to CT in June 2021) ulceration of the left ICA near the bulb and 55% stenosis, >70% L ICA stenosis with PSV >230cm/s    LABS:      Sodium   Date Value Ref Range Status   11/03/2021 133 (L) 136 - 145 mmol/L Final   08/12/2021 135 (L) 136 - 145 mmol/L Final   06/28/2021 137 133 - 144 mmol/L Final   06/28/2021 134 133 - 144 mmol/L Final   06/27/2021 134 133 - 144 mmol/L Final     Urea Nitrogen   Date Value Ref Range Status   11/03/2021 17 8 - 22 mg/dL Final   08/12/2021 17 8 - 22 mg/dL Final   06/28/2021 13 7 - 30 mg/dL Final   06/28/2021 14 7 - 30 mg/dL Final   06/27/2021 18 7 - 30 mg/dL Final     Hemoglobin   Date Value Ref Range Status   11/03/2021 16.3 13.3 - 17.7 g/dL Final   06/28/2021 15.2 13.3 - 17.7 g/dL Final   06/28/2021 14.6 13.3 - 17.7 g/dL Final   06/27/2021 14.8 13.3 - 17.7 g/dL Final     Platelet Count   Date Value Ref Range Status   11/03/2021 219 150 - 450 10e3/uL Final   06/28/2021 203 150 - 450 10e9/L Final   06/28/2021 210 150 - 450 10e9/L Final   06/27/2021 209 150 - 450 10e9/L Final     INR   Date Value Ref Range Status   11/03/2021 1.03 0.85 - 1.15 Final   06/27/2021 1.02 0.86 - 1.14 Final       Total time spent 60 minutes     Marysol Lara MD  11/03/21  8:06 PM
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
## 2021-11-04 PROBLEM — I63.239: Status: ACTIVE | Noted: 2021-01-01

## 2021-11-04 NOTE — ANESTHESIA PROCEDURE NOTES
Airway       Patient location during procedure: OR       Procedure Start/Stop Times: 11/4/2021 3:14 PM  Staff -        Anesthesiologist:  Susy Denney MD       CRNA: Donavan Catalan APRN CRNA       Other Anesthesia Staff: Leighton Lockwood       Performed By: SRNAIndications and Patient Condition       Indications for airway management: rinku-procedural       Induction type:intravenous       Mask difficulty assessment: 2 - vent by mask + OA or adjuvant +/- NMBA    Final Airway Details       Final airway type: endotracheal airway       Successful airway: ETT - single  Endotracheal Airway Details        ETT size (mm): 8.0       Cuffed: yes       Cuff volume (mL): 10       Successful intubation technique: video laryngoscopy       VL Blade Size: Glidescope 4       Grade View of Cords: 1       Adjucts: stylet       Position: Right       Measured from: lips       Secured at (cm): 23    Post intubation assessment        Placement verified by: capnometry, equal breath sounds and chest rise        Number of attempts at approach: 1       Number of other approaches attempted: 0       Secured with: silk tape       Ease of procedure: easy       Dentition: Unchanged

## 2021-11-04 NOTE — PROGRESS NOTES
Mr. Young is a 65yo male with symptomatic left internal carotid artery stenosis >70% based on NASCET criteria and an ulcerated plaque on CTA.     Overnight, no acute events. Denies repeat aphasia. Got plavix load yesterday. Glucose better controlled today. Echo today unremarkable. Again discussed the surgery and the risks and benefits with the patient, his daughter Bailey, and son over the telephone. All questions answered.    Vitals:    11/04/21 0006 11/04/21 0317 11/04/21 0725 11/04/21 1117   BP: (!) 188/84 (!) 140/86 (!) 169/83 (!) 163/85   BP Location: Left arm Left arm Left arm Left arm   Pulse: 69 74 77 67   Resp: 18 19 20 20   Temp: 98.9  F (37.2  C) 98.6  F (37  C) 98.1  F (36.7  C) 98.4  F (36.9  C)   TempSrc: Oral Oral Oral Oral   SpO2: 94% 94% 95% 97%   Weight:         On exam he is awake, alert, and conversant. CN II-XII intact. 5/5 strength in BUE and BLE. Abd soft and obese. 2+ radial pulses, regular.    Labs reviewed.  Recent Results (from the past 24 hour(s))   INR    Collection Time: 11/03/21 12:17 PM   Result Value Ref Range    INR 1.03 0.85 - 1.15   Partial thromboplastin time    Collection Time: 11/03/21 12:17 PM   Result Value Ref Range    aPTT 26 22 - 38 Seconds   Extra Red Top Tube    Collection Time: 11/03/21 12:17 PM   Result Value Ref Range    Hold Specimen JIC    Keppra (Levetiracetam) Level    Collection Time: 11/03/21 12:17 PM   Result Value Ref Range    Levetiracetam 5.7 (L) 6.0 - 46.0 ug/mL   Basic metabolic panel    Collection Time: 11/03/21 12:18 PM   Result Value Ref Range    Sodium 133 (L) 136 - 145 mmol/L    Potassium 4.3 3.5 - 5.0 mmol/L    Chloride 97 (L) 98 - 107 mmol/L    Carbon Dioxide (CO2) 26 22 - 31 mmol/L    Anion Gap 10 5 - 18 mmol/L    Urea Nitrogen 17 8 - 22 mg/dL    Creatinine 1.30 0.70 - 1.30 mg/dL    Calcium 9.6 8.5 - 10.5 mg/dL    Glucose 329 (H) 70 - 125 mg/dL    GFR Estimate 57 (L) >60 mL/min/1.73m2   Troponin I    Collection Time: 11/03/21 12:18 PM   Result  Value Ref Range    Troponin I 0.01 0.00 - 0.29 ng/mL   CBC with platelets and differential    Collection Time: 11/03/21 12:18 PM   Result Value Ref Range    WBC Count 10.8 4.0 - 11.0 10e3/uL    RBC Count 5.95 (H) 4.40 - 5.90 10e6/uL    Hemoglobin 16.3 13.3 - 17.7 g/dL    Hematocrit 49.5 40.0 - 53.0 %    MCV 83 78 - 100 fL    MCH 27.4 26.5 - 33.0 pg    MCHC 32.9 31.5 - 36.5 g/dL    RDW 13.2 10.0 - 15.0 %    Platelet Count 219 150 - 450 10e3/uL    % Neutrophils 88 %    % Lymphocytes 7 %    % Monocytes 4 %    % Eosinophils 0 %    % Basophils 0 %    % Immature Granulocytes 1 %    NRBCs per 100 WBC 0 <1 /100    Absolute Neutrophils 9.6 (H) 1.6 - 8.3 10e3/uL    Absolute Lymphocytes 0.7 (L) 0.8 - 5.3 10e3/uL    Absolute Monocytes 0.4 0.0 - 1.3 10e3/uL    Absolute Eosinophils 0.0 0.0 - 0.7 10e3/uL    Absolute Basophils 0.0 0.0 - 0.2 10e3/uL    Absolute Immature Granulocytes 0.1 (H) <=0.0 10e3/uL    Absolute NRBCs 0.0 10e3/uL   Hemoglobin A1c    Collection Time: 11/03/21 12:18 PM   Result Value Ref Range    Hemoglobin A1C 9.4 (H) <=5.6 %   Lipid panel reflex to direct LDL: Non-fasting    Collection Time: 11/03/21 12:18 PM   Result Value Ref Range    Cholesterol 197 <=199 mg/dL    Triglycerides 111 <=149 mg/dL    Direct Measure HDL 57 >=40 mg/dL    LDL Cholesterol Calculated 118 <=129 mg/dL   ECG 12-LEAD WITH MUSE ([facility])    Collection Time: 11/03/21 12:41 PM   Result Value Ref Range    Systolic Blood Pressure 216 mmHg    Diastolic Blood Pressure 97 mmHg    Ventricular Rate 82 BPM    Atrial Rate 82 BPM    CA Interval 172 ms    QRS Duration 76 ms     ms    QTc 455 ms    P Axis 66 degrees    R AXIS 56 degrees    T Axis 78 degrees    Interpretation ECG       Sinus rhythm  Nonspecific T wave abnormality  Abnormal ECG  When compared with ECG of 18-SEP-2019 14:37,  QT has lengthened  Confirmed by SEE ED PROVIDER NOTE FOR, ECG INTERPRETATION ([id number]),  ISMAEL WILEY ([id number]) on 11/3/2021 2:17:24 PM     Troponin I     Collection Time: 11/03/21  2:42 PM   Result Value Ref Range    Troponin I <0.01 0.00 - 0.29 ng/mL   Magnesium    Collection Time: 11/03/21  2:42 PM   Result Value Ref Range    Magnesium 1.8 1.8 - 2.6 mg/dL   Asymptomatic COVID-19 Virus (Coronavirus) by PCR Nasopharyngeal    Collection Time: 11/03/21  2:46 PM    Specimen: Nasopharyngeal; Swab   Result Value Ref Range    SARS CoV2 PCR Negative Negative   ECG 12-LEAD WITH MUSE ([facility])    Collection Time: 11/03/21  3:03 PM   Result Value Ref Range    Systolic Blood Pressure 199 mmHg    Diastolic Blood Pressure 97 mmHg    Ventricular Rate 80 BPM    Atrial Rate 80 BPM    VA Interval 168 ms    QRS Duration 76 ms     ms    QTc 578 ms    P Axis 73 degrees    R AXIS 59 degrees    T Axis 86 degrees    Interpretation ECG       Sinus rhythm  Nonspecific T wave abnormality  Abnormal ECG  When compared with ECG of 03-NOV-2021 12:41,  QT has lengthened  Confirmed by SEE ED PROVIDER NOTE FOR, ECG INTERPRETATION ([id number]),  ISMAEL WILEY ([id number]) on 11/3/2021 10:22:34 PM     Glucose by meter    Collection Time: 11/03/21  4:04 PM   Result Value Ref Range    GLUCOSE BY METER POCT 267 (H) 70 - 99 mg/dL   Glucose by meter    Collection Time: 11/03/21  7:56 PM   Result Value Ref Range    GLUCOSE BY METER POCT 198 (H) 70 - 99 mg/dL   Glucose by meter    Collection Time: 11/04/21 12:03 AM   Result Value Ref Range    GLUCOSE BY METER POCT 172 (H) 70 - 99 mg/dL   Glucose by meter    Collection Time: 11/04/21  3:15 AM   Result Value Ref Range    GLUCOSE BY METER POCT 191 (H) 70 - 99 mg/dL   Glucose by meter    Collection Time: 11/04/21  6:33 AM   Result Value Ref Range    GLUCOSE BY METER POCT 147 (H) 70 - 99 mg/dL   CBC with platelets    Collection Time: 11/04/21  7:44 AM   Result Value Ref Range    WBC Count 8.1 4.0 - 11.0 10e3/uL    RBC Count 5.32 4.40 - 5.90 10e6/uL    Hemoglobin 14.8 13.3 - 17.7 g/dL    Hematocrit 45.3 40.0 - 53.0 %    MCV 85 78 - 100 fL    MCH 27.8 26.5 - 33.0 pg     MCHC 32.7 31.5 - 36.5 g/dL    RDW 13.2 10.0 - 15.0 %    Platelet Count 204 150 - 450 10e3/uL   Basic metabolic panel    Collection Time: 11/04/21  7:44 AM   Result Value Ref Range    Sodium 136 136 - 145 mmol/L    Potassium 3.8 3.5 - 5.0 mmol/L    Chloride 105 98 - 107 mmol/L    Carbon Dioxide (CO2) 26 22 - 31 mmol/L    Anion Gap 5 5 - 18 mmol/L    Urea Nitrogen 19 8 - 22 mg/dL    Creatinine 0.93 0.70 - 1.30 mg/dL    Calcium 9.3 8.5 - 10.5 mg/dL    Glucose 181 (H) 70 - 125 mg/dL    GFR Estimate 85 >60 mL/min/1.73m2   Adult Type and Screen    Collection Time: 11/04/21  7:44 AM   Result Value Ref Range    ABO/RH(D) O POS     Antibody Screen Negative Negative    SPECIMEN EXPIRATION DATE [id number]    Magnesium    Collection Time: 11/04/21  7:44 AM   Result Value Ref Range    Magnesium 1.9 1.8 - 2.6 mg/dL   Glucose by meter    Collection Time: 11/04/21  7:53 AM   Result Value Ref Range    GLUCOSE BY METER POCT 164 (H) 70 - 99 mg/dL   Echocardiogram Complete    Collection Time: 11/04/21  7:54 AM   Result Value Ref Range    LVEF  55-60%    Glucose by meter    Collection Time: 11/04/21 11:14 AM   Result Value Ref Range    GLUCOSE BY METER POCT 178 (H) 70 - 99 mg/dL     A&P: Mr. Young is a 65yo male with symptomatic >70% stenosis of the left ICA with ulcerated plaque. Continue dual antiplatelet therapy. To the operating room today for left carotid endarterectomy.    Marysol Lara MD  11/04/21  12:13 PM

## 2021-11-04 NOTE — PROGRESS NOTES
11/04/21 0911   Quick Adds   Type of Visit Initial PT Evaluation   Living Environment   People in home alone   Current Living Arrangements apartment   Home Accessibility stairs to enter home   Number of Stairs, Main Entrance 3   Stair Railings, Main Entrance railings safe and in good condition   Self-Care   Usual Activity Tolerance excellent   Current Activity Tolerance good   Equipment Currently Used at Home none   Activity/Exercise/Self-Care Comment ind w/ all activities   General Information   Onset of Illness/Injury or Date of Surgery 11/03/21   Referring Physician Brenda Auguste MD   Patient/Family Therapy Goals Statement (PT) return home   Pertinent History of Current Problem (include personal factors and/or comorbidities that impact the POC) He has a hx of RCA CVA, hx of post cva sz, dm, lipids, htn presents with acute onset aphasia today, confusion x 2 days, headache x 2 days. He has ruled in for acute CVA    Cognition   Orientation Status (Cognition) oriented x 4   Pain Assessment   Patient Currently in Pain No   Range of Motion (ROM)   ROM Quick Adds ROM WNL   Strength   Manual Muscle Testing Quick Adds Strength WFL   Bed Mobility   Bed Mobility supine-sit   Supine-Sit [place] (Bed Mobility) independent   Comment (Bed Mobility) no difficulty   Transfers   Transfers sit-stand transfer   Sit-Stand Transfer   Sit-Stand [place] (Transfers) supervision;independent   Assistive Device (Sit-Stand Transfers)   (none)   Sit/Stand Transfer Comments no LOB, no light headedness, good stability   Gait/Stairs (Locomotion)   Garfield Level (Gait) supervision   Assistive Device (Gait) other (see comments)  (none vs with IV pole)   Distance in Feet (Required for LE Total Joints) 150' x 2  (w/o AD, with pushing pole)   Comment (Gait/Stairs) good stability   Balance   Balance no deficits were identified   Clinical Impression   Criteria for Skilled Therapeutic Intervention yes, treatment indicated   PT  Diagnosis (PT) N/A   Influenced by the following impairments N/A   Functional limitations due to impairments N/A   Clinical Presentation Stable/Uncomplicated   Clinical Presentation Rationale pt presents as medically diagnosed   Clinical Decision Making (Complexity) low complexity   Therapy Frequency (PT) Evaluation only   Predicted Duration of Therapy Intervention (days/wks) eval only   Planned Therapy Interventions (PT)   (N/A)   Anticipated Equipment Needs at Discharge (PT)   (none)   Risk & Benefits of therapy have been explained evaluation/treatment results reviewed;patient;daughter   PT Discharge Planning    PT Discharge Recommendation (DC Rec) home   Total Evaluation Time   Total Evaluation Time (Minutes) 10       Patient will participate in evaluation by 11/4/2021 in order to assess functional level and determine safe discharge plan. -met    Goal entered on 11/4/2021 by Mónica Nicholas, PT, DTaP    Discharged from PT at this time, should pt status change, please re-order as appropriate. Thank you!

## 2021-11-04 NOTE — ANESTHESIA PROCEDURE NOTES
Arterial Line Procedure Note    Pre-Procedure   Staff -        Anesthesiologist:  Miguel Seth MD       Performed By: anesthesiologist       Location: pre-op       Procedure Start/Stop Times: 11/4/2021 2:42 PM and 11/4/2021 2:48 PM       Pre-Anesthestic Checklist: patient identified, IV checked, risks and benefits discussed, informed consent, monitors and equipment checked, pre-op evaluation and at physician/surgeon's request  Timeout:       Correct Patient: Yes        Correct Procedure: Yes        Correct Site: Yes        Correct Position: Yes   Procedure   Procedure: arterial line       Laterality: left       Insertion Site: radial.  Sterile Prep        Skin prep: Chloraprep  Insertion/Injection        Technique: ultrasound guided, Seldinger Technique and Rinku's test completed        1. Ultrasound was used to evaluate the access site.       2. Artery evaluated via ultrasound for patency/adequacy.       3. Using real-time ultrasound the needle/catheter was observed entering the artery/vein.       4. Permanent image was captured and entered into the patient's record.       5. The visualized structures were anatomically normal.       6. There were no apparent abnormal pathologic findings.       Catheter Type/Size: 20 G, 12 cm  Narrative         Secured by: suture       Tegaderm and Biopatch dressing used.       Complications: None apparent,      Arterial waveform: Yes

## 2021-11-04 NOTE — CONSULTS
DIABETES CARE  Consulted by Provider for Diabetes Education    66year old male  with type 2 diabetes. Patient was admitted for acute CVa with confusion and headache for 2 days. .   Related Co-morbidities include: HTN and HLD     PCP: Ashley Narvaez PA-C  Social:Lives alone     Nutrition & Diabetes History: History of type 2 diabetes using orals only to control.  Has been high A1c's but has worked on lowering in the last 3 months from 11.7 to 9.4 with changes in actos and diet and exercise..    Meds for BG Management PTA:  -glipizide xl 10 mg once daily  -Actos 30 mg once daily      Current Inpatient Meds for BG Management:  -Novolog medium correction 1 drops 50  5 units Lantus at bedtime.       Labs:  Hemoglobin A1C:9.4            Hgb:16.3  SCr:13.  GFR:57   BGs:   Results for ECTOR MCFADDEN (MRN [id number]) as of 11/4/2021 11:03   Ref. Range 11/3/2021 12:18 11/3/2021 16:04 11/3/2021 19:56 11/4/2021 00:03 11/4/2021 03:15 11/4/2021 06:33 11/4/2021 07:44 11/4/2021 07:53   Glucose Latest Ref Range: 70 - 125 mg/dL 329 (H)      181 (H)    GLUCOSE BY METER POCT Latest Ref Range: 70 - 99 mg/dL  267 (H) 198 (H) 172 (H) 191 (H) 147 (H)  164 (H)     Diet Order:  NPO Intake: None for now   Weight: 89.8kg  BMI: 29.24    DM EDUCATION/COUNSELING:  Barriers to Learning and/or DM Self-Management: None  Previous DM Education:   Yes with provider and endocrinology  Current Education and/or visit with Patient and/or caregiver(s):  Reviewed diabetes disease with patient and daughter.  Discussed current blood sugars/A1C and target/goal numbers. Reviewed importance of checking blood glucose levels and keeping log and/or bringing meter to all f/u visits. Reviewed hypoglycemia symptoms. and ri Educated on how to treat low Bg.     Discussed diet and exercise as important components to good BG control. Briefly reviewed diabetic diet including what foods have cho and portions.      Reviewed lantus insulin pen use. Medications were  "explained, including how they each acted on blood sugar, their timing and differences in dosing. The insulin pen was demonstrated by patient.. Also discussed site rotation, proper storage and safe needle disposal.     Patient has meter and tests his blood sugar.   (See also \"Diabetic Ed Flowsheet\"  Or Education tab-diabetes for any education topic details.)    ASSESSMENT:  Elevated A1c although is down from 3 months ago high of 11.7.  Actos was increased and some diet and exercise changes were made.      RECOMMENDATIONS:  Patient is started on longacting insulin and trained on how to use.  He can use at home if necessary.  If want I to continue use at discharge, then order  Lantus solostar pen  Insulin pen needles 4mm x 32 gauge  Inject once daily at bedtime.     For inpatient diabetes management guidelines refer to \"Guidelines for Subcutaneous Insulin Dosing\" found in the DIAB Subcutaneous Insulin Management Adult order set.     F/U PLAN:  Will send request to PCP for DM educator referral at discharge.    DISCHARGE NEEDS:    Possibly   Lantus solostar pen  Pen needles 4mm x 32 gauge.     Thank you,   Sia Chopra RN, [facility]  Diabetes Care   VM: [phone]  Pager: [phone]             "
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
## 2021-11-04 NOTE — PROGRESS NOTES
[facility]    PROGRESS NOTE - Hospitalist Service    Assessment and Plan    66 year old male admitted on 11/3/2021. He has a hx of RCA CVA, hx of post cva sz, dm, lipids, htn presents with acute onset aphasia today, confusion x 2 days, headache x 2 days. He has ruled in for acute CVA         Acute CVA   - History of Right CVA infarct in past  - Patient had behavior off x 2 days, headache 2 days process not just today  -outside window TPA  - CTA no acute cva, but ulcerative LICA  - Neurology consult, appreciate input  - MRI brain shows acute ischemia of left subinsular region of the left periventricular corona radiator  - has been on ASA-81 mg + Statin-high dose, likely need to add Plavix--both vascular and neuro agree, add Plavix 75 mg now  - Echo shows EF of 55 to 60%  - Improving neurological symptoms significantly today.  - Continue post stroke protocol.  - PT/OT and speech evaluation.    Acute ulcerative Left ICA  - carotid ultrasound shows stenosis more than 70%  - Vascular surgery consult, appreciate input  - ASA, high dose statin, and add Plavix  - Plan for left carotid endarterectomy today     History of seizure disorder  -Started months after last cva  - has been on Keppra, he is not sure if he took it yesterday  - son notes unusual behavior yesterday, ?sz vs cva  - continue Keppra-- and check level  - Check EEG  - Neurology consult, appreciate input  - he does not drive due to sz hx     Diabetes mellitus type 2  -Hold home medications patient is currently n.p.o.  -cover with insulin sliding scale  -check A1c--last one 8/12/21 11.7, now today 9.4, still high joão with vascular dx  - likely needs insulin and not just po meds--start lantus tonight  - Will need DM ed     Accelerated hypertension  -Elevated blood pressure probably secondary to his stroke  -allow permissive blood pressure for now as recommended by neurology  -Continue with hydralazine IV as needed.       Hyperlipidemia  -Continue high-dose statin     7.Pseudo-Hyponatremia with hyperglycemia  -corrected NA is 137 when account for hyperglycemia       Active Problems:    Atherosclerosis of left carotid artery    Acute CVA (cerebrovascular accident) (H)      VTE prophylaxis:  Pneumatic Compression Devices  DIET: Orders Placed This Encounter      NPO per Anesthesia Guidelines for Procedure/Surgery Except for: Meds      Disposition/Barriers to discharge: Carotid endarterectomy surgery, postop protocol  Code Status: Full Code    Subjective:  Jorge is feeling much better today, no acute significant neurological changes overnight.    PHYSICAL EXAM  Vitals:    11/03/21 1147   Weight: 89.8 kg (198 lb)     B/P:144/80 T:98.4 P:66 R:18     Intake/Output Summary (Last 24 hours) at 11/4/2021 1351  Last data filed at 11/3/2021 1845  Gross per 24 hour   Intake 240 ml   Output --   Net 240 ml      Body mass index is 29.24 kg/m .    Constitutional: awake, alert, cooperative, no apparent distress, and appears stated age  Eyes: Lids and lashes normal, pupils equal, round and reactive to light, extra ocular muscles intact, sclera clear, conjunctiva normal  ENT: Normocephalic, without obvious abnormality, atraumatic, sinuses nontender on palpation, external ears without lesions, oral pharynx with moist mucous membranes, tonsils without erythema or exudates, gums normal and good dentition.  Respiratory: No increased work of breathing, good air exchange, clear to auscultation bilaterally, no crackles or wheezing  Cardiovascular: Normal apical impulse, regular rate and rhythm, normal S1 and S2, no S3 or S4, and no murmur noted  GI: No scars, normal bowel sounds, soft, non-distended, non-tender, no masses palpated, no hepatosplenomegally  Skin: no bruising or bleeding and normal skin color, texture, turgor  Musculoskeletal: There is no redness, warmth, or swelling of the joints.  Full range of motion noted.  no lower extremity pitting edema  present  Neurologic: Awake, alert, oriented to name, place and time.  Cranial nerves II-XII are grossly intact.  Motor is 5 out of 5 bilaterally.   Sensory is intact.    Neuropsychiatric: Appropriate with examiner      PERTINENT LABS/IMAGING:  Recent Labs   Lab 11/04/21  1235 11/04/21  1114 11/04/21  0753 11/04/21  0744 11/04/21  0633 11/03/21  1604 11/03/21  1218 11/03/21  1217 11/03/21  1207   WBC  --   --   --  8.1  --   --  10.8  --   --    HGB  --   --   --  14.8  --   --  16.3  --   --    MCV  --   --   --  85  --   --  83  --   --    PLT  --   --   --  204  --   --  219  --   --    INR  --   --   --   --   --   --   --  1.03  --    NA  --   --   --  136  --   --  133*  --   --    POTASSIUM  --   --   --  3.8  --   --  4.3  --   --    CHLORIDE  --   --   --  105  --   --  97*  --   --    CO2  --   --   --  26  --   --  26  --   --    BUN  --   --   --  19  --   --  17  --   --    CR  --   --   --  0.93  --   --  1.30  --  1.1   ANIONGAP  --   --   --  5  --   --  10  --   --    GLENN  --   --   --  9.3  --   --  9.6  --   --    * 178* 164* 181*   < >   < > 329*  --   --     < > = values in this interval not displayed.     Recent Results (from the past 24 hour(s))   US Carotid Bilateral    Narrative    EXAM: US CAROTID BILATERAL  LOCATION: [facility]  DATE/TIME: 11/3/2021 4:13 PM    INDICATION: 67 y/o man with acute CVA now, concern left ICA with possible ulcerative plaque  COMPARISON: Same day CTA  TECHNIQUE: Duplex exam performed utilizing 2D gray-scale imaging, Doppler interrogation with color-flow and spectral waveform analysis. The percent diameter stenosis is determined using NASCET criteria and Society of Radiologists in Ultrasound Consensus   Criteria.    FINDINGS:    RIGHT: Mild plaque at the bifurcation. The peak systolic velocity in the ICA is less than 125 cm/sec, consistent with less than 50% stenosis. Normal velocities in the ECA. Antegrade flow within the vertebral  artery.     LEFT: Severe plaque at the bifurcation. The peak systolic velocity in the ICA is greater than 230 cm/sec, consistent with greater than 70% stenosis. Normal velocities in the ECA. Antegrade flow within the vertebral artery.    VELOCITY CHART:  CCA   Right: 77 cm/s   Left: 61 cm/s  ICA   Right: 123 cm/s   Left: 459 cm/s  ECA   Right: 124 cm/s   Left: 99 cm/s  ICA/CCA PSV Ratio   Right: 1.59   Left: 7.55      Impression    IMPRESSION:  1.  Mild plaque formation, velocities consistent with less than 50% stenosis in the right internal carotid artery.  2.  Severe plaque formation, velocities consistent with greater than 70% stenosis in the left internal carotid artery.  3.  Flow within the vertebral arteries is antegrade.   Echocardiogram Complete   Result Value    LVEF  55-60%    [facility]    [id number]  [id number]  [id number]  [id number]^IRA^LUIS^[place] [address]     Name: ECTOR MCFADDEN  MRN: [id number]  : 1955  Study Date: 2021 06:41 AM  Age: 66 yrs  Gender: Male  Patient Location: [facility]  Reason For Study: TIA  Ordering Physician: LUIS ROTH  Performed By: VIRA     BSA: 2.1 m2  Height: 69 in  Weight: 198 lb  HR: 67  ______________________________________________________________________________  Procedure  No hemodynamically significant valvular abnormalities on 2D or color flow  imaging. There is no comparison study available.  ______________________________________________________________________________  Interpretation Summary     1.Left ventricular size, wall motion and function are normal. The ejection  fraction is 55-60%.  2.TAPSE is normal, which is consistent with normal right ventricular systolic  function.  3.No hemodynamically significant valvular abnormalities on 2D or color flow  imaging.  4.No obvious embolic source seen.  5.There is no comparison study  available.  ______________________________________________________________________________  I      WMSI = 1.00     % Normal = 100     X - Cannot   0 -                      (2) - Mildly 2 -          Segments  Size  Interpret    Hyperkinetic 1 - Normal  Hypokinetic  Hypokinetic  1-2     small                                                     7 -          3-5      moderate  3 - Akinetic 4 -          5 -         6 - Akinetic Dyskinetic   6-14    large               Dyskinetic   Aneurysmal  w/scar       w/scar       15-16   diffuse     Left Ventricle  Left ventricular size, wall motion and function are normal. The ejection  fraction is 55-60%. There is normal left ventricular wall thickness. Left  ventricular diastolic function is normal. No regional wall motion  abnormalities noted.     Right Ventricle  Normal right ventricle size and systolic function. TAPSE is normal, which is  consistent with normal right ventricular systolic function.     Atria  Normal left atrial size. Right atrial size is normal. There is no color  Doppler evidence of an atrial shunt.     Mitral Valve  Mitral valve leaflets appear normal. There is no evidence of mitral stenosis  or clinically significant mitral regurgitation. There is no mitral  regurgitation noted. There is no mitral valve stenosis.     Tricuspid Valve  Tricuspid valve leaflets appear normal. Right ventricle systolic pressure  estimate normal. There is physiologic tricuspid regurgitation. There is no  tricuspid stenosis.     Aortic Valve  The aortic valve is trileaflet. Thickened aortic valve leaflets. No aortic  regurgitation is present. No aortic stenosis is present.     Pulmonic Valve  The pulmonic valve is not well seen, but is grossly normal. This degree of  valvular regurgitation is within normal limits. There is no pulmonic valvular  stenosis.     Vessels  The aorta root is normal. Normal size ascending aorta. IVC diameter <2.1 cm  collapsing >50% with sniff suggests a  normal RA pressure of 3 mmHg.     Pericardium  There is no pericardial effusion.     Rhythm  Sinus rhythm was noted.     ______________________________________________________________________________  MMode/2D Measurements & Calculations  IVSd: 1.4 cm  LVIDd: 4.2 cm  LVIDs: 2.8 cm  LVPWd: 1.5 cm     FS: 32.5 %  LV mass(C)d: 237.1 grams  LV mass(C)dI: 115.3 grams/m2  Ao root diam: 3.4 cm  LA dimension: 3.5 cm  asc Aorta Diam: 3.7 cm  LA/Ao: 1.0  LVOT diam: 2.0 cm  LVOT area: 3.0 cm2  LA Volume Indexed (AL/bp): 36.1 ml/m2  RWT: 0.72     Doppler Measurements & Calculations  MV E max marvin: 74.2 cm/sec  MV A max marvin: 69.8 cm/sec  MV E/A: 1.1     MV dec slope: 334.6 cm/sec2  MV dec time: 0.22 sec  Ao V2 max: 129.0 cm/sec  Ao max P.0 mmHg  Ao V2 mean: 87.1 cm/sec  Ao mean PG: 3.5 mmHg  Ao V2 VTI: 25.8 cm  VISH(I,D): 2.9 cm2  VISH(V,D): 2.5 cm2  LV V1 max P.5 mmHg  LV V1 max: 106.2 cm/sec  LV V1 VTI: 24.2 cm  SV(LVOT): 73.9 ml  SI(LVOT): 35.9 ml/m2  PA acc time: 0.11 sec  PI end-d marvin: 101.8 cm/sec  TR max marvin: 221.3 cm/sec  TR max P.6 mmHg  AV Marvin Ratio (DI): 0.82  VISH Index (cm2/m2): 1.4  E/E' av.3  Lateral E/e': 9.5  Medial E/e': 13.1     ______________________________________________________________________________  Report approved by: Kasandra Yoo 2021 08:23 AM             Discussed with patient, family, neurology, nursing staff and discharge planner    Taurus Rojas MD  [facility] Medicine Service  [phone]

## 2021-11-04 NOTE — OP NOTE
VASCULAR SURGERY OPERATIVE REPORT        LOCATION:    [facility]    Jorge Young   Medical Record #:  [id number]  YOB: 1955  Age:  66 year old     Date of Service: 11/3/2021    PRIMARY CARE PROVIDER: Ashley Narvaez    Preoperative diagnosis  Symptomatic left carotid stenosis     Postoperative diagnosis    Same    Surgeon: Xavi Frazier MD, RPVI   Assistant: Marysol Lara MD vascular surgery fellow     Name of the procedure    1. LeftCarotid endarterectomy with EEG monitoring  2. Intravascular carotid ultrasound    Indication for procedure:    66 y.o male with symptomatic high grade carotid stenosis. He was scheduled for the left CEA. Risks and benefits were explained and patient agreed to proceed     Description of procedure:    The patient was placed in the supine position. The procedure was performed under general endotracheal/regional/local anesthesia. The left neck was draped in a standard fashion.  The timeout was performed.  Preoperative antibiotics were given.Electroencephalogram (EEG) monitoring was commenced. A longitudinal skin incision was made overlying the anterior border of the sternocleidomastoid muscle. The incision was deepened through the platysma with electrocautery. The sternocleidomastoid muscle was retracted laterally. The internal jugular vein was identified. Dissection along the medial border of the jugular vein revealed the facial vein. The facial vein was transected and suture ligated using a 3-0 silk suture and medium hemoclips.The common carotid, the internal carotid, the superior thyroid, and the external carotid arteries were exposed and dissected carefully to minimize aggressive displacement of the bulb.  The vagus and hypoglossal nerves were identified and preserved. Traction over the angle of the mandible was avoided to protect the mandibular branch of the facial nerve.9000units of heparin were administered intravenously.  3 minutes after  heparin administration, the internal carotid artery was clamped with a Sow clamp. The common carotid artery was clamped with a profunda vascular clamp. The external carotid and superior thyroid arteries were controlled with silastic vessel loops.  No EEG changes were noted  after cross-clamping the internal carotid artery. An arteriotomy was performed on the anterolateral surface of the common carotid artery with a #11 blade scalpel and extended into the internal carotid artery using a Pott s scissors.    The endarterectomy plane was developed with a Holman elevator. The plaque was transected proximally in the common carotid artery. In the distal internal carotid artery, the plaque was feathered off, leaving a smooth endpoint. Eversion endarterectomy of the external carotid artery was performed and the carotid plaque was removed.The endarterectomized surface was gently irrigated with heparinized saline solution. All remaining free debris was removed with a fine forceps.The distal endarterectomy endpoint was inspected.  The arteriotomy was closed using a bovine patch angioplasty. A bovine patch was used.   The patch was trimmed and the patch angioplasty was performed using a continuous 6-0 prolene suture. The suture was started at the apex of the arteriotomy in the internal carotid artery and ran on each side. The patch was trimmed to the appropriate size and another suture was started at the other end of the patch and ran on each side to meet the first suture.  The internal carotid, external carotid, and superior thyroid arteries were backbled. The common carotid was forwardbled. The carotid lumen was again irrigated with heparinized saline.  Flow was first reestablished into the external carotid artery and then into the internal carotid artery.   The suture line was checked for hemostasis. Needle hole bleeding was controlled with the topical application of Surgical.  Then I proceeded with intravascular  ultrasound.  The hockey-stick probe was used.  Initially I started with a B-mode to evaluate common carotid artery, internal carotid artery, and external carotid artery.  I did not find any intraluminal defects.  Then I switched to a Doppler mode, and velocities were measured in the common carotid artery, internal carotid artery, and external carotid artery.  A the measures are displayed below.  After ensuring hemostasis, the wounds were closed using 3-0 Vicryl for the platysma and soft tissues. The skin was closed 4-0 Monocryl suture.  Skin glue was applied.  The patient was awakened, noted to have no gross neurological deficits, and taken to the postanesthesia care unit in stable condition.      Estimated blood loss: 150 cc    Specimens: Carotid plaque    Complications: None    Intravascular ultrasound    CCA 56/9 cm/sec  ICA 67/22 cm/sec  ECA 54 cm/sec  B-mode: No intravascular defects are noted      Xavi Frazier MD, MD, [facility]  VASCULAR SURGERY

## 2021-11-04 NOTE — DISCHARGE INSTRUCTIONS
DIABETES REMINDERS:  1) Check your blood sugar2 times per day.Check first thing in and 2 hours after meals.   Always bring your blood sugar log and meter to your diabetes-related appointments.  2) Your blood sugar goals:  80-130mg/dL before eating  and  mg/dL 2 hours after eating (or per your doctor).  3) Always be prepared to treat a low blood sugar should it happen. Keep a sugar-containing beverage or food nearby.  4) When to call your clinic:     Blood sugar over 400 mg/dL.     If you have 2 to 3 low blood sugars (under 70mg/dL) in a row,     Low reading the same time of day several days in a row,    Blood sugars elevated and you can not get them down with your usual diabetes regimen,    You are ill and can't keep blood sugars controlled.   5) When to call 911:  If your blood glucose does not get better with treatment, or if you/someone else is unable to give you treatment.  6) Talk to your primary care doctor about an appointment with a Certified Diabetes Educator to assist you with your BG management.

## 2021-11-04 NOTE — PROGRESS NOTES
Care Management Follow Up    Length of Stay (days): 1    Expected Discharge Date: 11/06/2021     Concerns to be Addressed:  endarterectomy 11/04     Patient plan of care discussed at interdisciplinary rounds: Yes    Anticipated Discharge Disposition: TBD; 11/03 PT recommendations home     Anticipated Discharge Services: TBD   Anticipated Discharge DME: not at this time     Patient/family educated on Medicare website which has current facility and service quality ratings:  N/A  Education Provided on the Discharge Plan: per team   Patient/Family in Agreement with the Plan: yes      Referrals Placed by CM/SW:  Not at this time  Private pay costs discussed: Not applicable    Additional Information:  Chart reviewed. MD update. 11/03 PT recommendations home. Surgery later today. Transfer of care to ICU post surgery. Care Manager to follow for potential discharge needs.      Mar Mckeon RN

## 2021-11-04 NOTE — PLAN OF CARE
Occupational Therapy: Orders received. Chart reviewed and discussed with care team.? Occupational Therapy not indicated due to pt independence at this time per PT.? Defer discharge recommendations for OT at this time, please re-order as appropriate, thank you! ? Will complete orders.

## 2021-11-04 NOTE — PLAN OF CARE
Pt a/o x4, score =0  on NIHS , neuro check intact, SPB high but asymptomatic and MD aware, on tele reading NSR, BS check q4hr was 198,172, 191 with  Novolog given as coverage, NPO at midnight for possible Endarterectomy today, independent in room, IVF running at 75ml/hr, on Mg protocol rechecked this AM, denied pain, numbness or tingling, no N/V/C/D. Slept most of the night , up for cares and assessment.

## 2021-11-04 NOTE — ANESTHESIA PREPROCEDURE EVALUATION
Anesthesia Pre-Procedure Evaluation    Patient: Jorge Young   MRN: [id number] : 1955        Preoperative Diagnosis: Symptomatic carotid artery stenosis with infarction (H) [I63.239]    Procedure : Procedure(s):  ENDARTERECTOMY, CAROTID; left with electroencephalogram and intraoperative duplex          Past Medical History:   Diagnosis Date     Benign essential hypertension      COVID-19 virus infection      Diabetes mellitus (H)      Hyperlipidemia LDL goal <100      Left facial numbness 2020     Left hand weakness 2020     Seizures (H)      Stroke (H)       Past Surgical History:   Procedure Laterality Date     APPENDECTOMY OPEN        Allergies   Allergen Reactions     Novocain [Procaine] Other (See Comments)     Tiredness        Social History     Tobacco Use     Smoking status: Never Smoker     Smokeless tobacco: Never Used   Substance Use Topics     Alcohol use: Not on file      Wt Readings from Last 1 Encounters:   21 89.8 kg (198 lb)        Anesthesia Evaluation   Pt has had prior anesthetic.     No history of anesthetic complications       ROS/MED HX  ENT/Pulmonary:  - neg pulmonary ROS     Neurologic:     (+) seizures, CVA, TIA,     Cardiovascular: Comment: 11/3/21 Bilateral carotid US  IMPRESSION:  1.  Mild plaque formation, velocities consistent with less than 50% stenosis in the right internal carotid artery.  2.  Severe plaque formation, velocities consistent with greater than 70% stenosis in the left internal carotid artery.  3.  Flow within the vertebral arteries is antegrade.    11/3/21 Echo  Interpretation Summary     1.Left ventricular size, wall motion and function are normal. The ejection  fraction is 55-60%.  2.TAPSE is normal, which is consistent with normal right ventricular systolic  function.  3.No hemodynamically significant valvular abnormalities on 2D or color flow  imaging.  4.No obvious embolic source seen.  5.There is no comparison study available.    (+)  Dyslipidemia hypertension-----    METS/Exercise Tolerance: >4 METS    Hematologic:  - neg hematologic  ROS     Musculoskeletal:  - neg musculoskeletal ROS     GI/Hepatic:  - neg GI/hepatic ROS     Renal/Genitourinary:       Endo:     (+) type II DM,     Psychiatric/Substance Use:       Infectious Disease:       Malignancy:       Other:            Physical Exam    Airway        Mallampati: II   TM distance: > 3 FB   Neck ROM: full   Mouth opening: > 3 cm    Respiratory Devices and Support         Dental  no notable dental history         Cardiovascular   cardiovascular exam normal          Pulmonary   pulmonary exam normal                OUTSIDE LABS:  CBC:   Lab Results   Component Value Date    WBC 8.1 11/04/2021    WBC 10.8 11/03/2021    HGB 14.8 11/04/2021    HGB 16.3 11/03/2021    HCT 45.3 11/04/2021    HCT 49.5 11/03/2021     11/04/2021     11/03/2021     BMP:   Lab Results   Component Value Date     11/04/2021     (L) 11/03/2021    POTASSIUM 3.8 11/04/2021    POTASSIUM 4.3 11/03/2021    CHLORIDE 105 11/04/2021    CHLORIDE 97 (L) 11/03/2021    CO2 26 11/04/2021    CO2 26 11/03/2021    BUN 19 11/04/2021    BUN 17 11/03/2021    CR 0.93 11/04/2021    CR 1.30 11/03/2021     (H) 11/04/2021     (H) 11/04/2021     COAGS:   Lab Results   Component Value Date    PTT 26 11/03/2021    INR 1.03 11/03/2021     POC:   Lab Results   Component Value Date     (H) 06/28/2021     HEPATIC:   Lab Results   Component Value Date    ALBUMIN 3.3 (L) 06/28/2021    PROTTOTAL 7.5 06/28/2021    ALT 22 06/28/2021    AST 14 06/28/2021    ALKPHOS 85 06/28/2021    BILITOTAL 0.6 06/28/2021    WARD 10 06/28/2021     OTHER:   Lab Results   Component Value Date    LACT 1.4 06/28/2021    A1C 9.4 (H) 11/03/2021    GLENN 9.3 11/04/2021    MAG 1.9 11/04/2021    TSH 1.63 06/28/2021       Anesthesia Plan    ASA Status:  3   NPO Status:  NPO Appropriate    Anesthesia Type: General.     - Airway: ETT    Induction: Intravenous, Etomidate, Propofol.   Maintenance: Balanced.   Techniques and Equipment:     - Lines/Monitors: 2nd IV, Arterial Line     Consents    Anesthesia Plan(s) and associated risks, benefits, and realistic alternatives discussed. Questions answered and patient/representative(s) expressed understanding.     - Discussed with:  Patient      - Extended Intubation/Ventilatory Support Discussed: No.      - Patient is DNR/DNI Status: No    Use of blood products discussed: Yes.     - Discussed with: Patient.     - Consented: consented to blood products            Reason for refusal: other.     Postoperative Care    Pain management: IV analgesics, Multi-modal analgesia.   PONV prophylaxis: Ondansetron (or other 5HT-3), Dexamethasone or Solumedrol     Comments:    Monitor BS every 1-2 hours    Phenylephrine inline  Nicardipine inline    PIV x 2    Decadron 4 mg  Zofran    Reverse with Sugammadex            Miguel Seth MD

## 2021-11-04 NOTE — PHARMACY-CONSULT NOTE
Pharmacy Consult to evaluate for medication related stroke core measures    Jorge Young, 66 year old male admitted for Acute CVA on 11/3/2021.    Thrombolytic was not given because of Time from onset contraindications    VTE Prophylaxis Heparin given on 11/3 as appropriate prior to end of hospital day 2.    Antithrombotic: clopidogrel + asa started on 11/3, as appropriate by end of hospital day 2. Continue antithrombotic therapy on discharge to meet quality measures, unless contraindicated.    Anticoagulation if history of A-fib/flutter: Patient does not have history of A-fib/flutter - anticoagulation not required for medication related stroke core measures.     LDL Cholesterol Calculated   Date Value Ref Range Status   11/03/2021 118 <=129 mg/dL Final     LDL Cholesterol Direct   Date Value Ref Range Status   10/10/2019 147 (H) <=129 mg/dl Final       Patient currently receiving Lipitor (atorvastatin) continue statin on discharge to meet quality measures, unless contraindicated.    Recommendations: Emphasize compliance with atorvastatin ( on admission, goal <70 per vascular surgery). Fill history would indicate many missed doses over the past year unless pt is obtaining medication elsewhere (filled 150 tablets in past 365 days).     Thank you for the consult.    Marika Arrington formerly [facility] 11/4/2021 8:39 AM

## 2021-11-04 NOTE — PROGRESS NOTES
Pt had rosario wipe bath. Neuro checks all WNL all shift. Telemetry monitor shows NSR. Pt alert and oriented x 4. Denies pain. Pt has been NPO all shift except for sip of water with meds this morning. Taken up to PACU at this time.

## 2021-11-04 NOTE — PROGRESS NOTES
"Speech/Language Evaluation     11/04/21 0845   General Information   Onset of Illness/Injury or Date of Surgery 11/03/21   Referring Physician Dr. Auguste   Pertinent History of Current Problem Per Neurology consult note dated 11/3/21, \"1. Acute left MCA infarct with aphasia and confusion.  2. Chronic right MCA infarct posterior right frontal and anterior right parietal lobes also involves the cortex.  3. History of being on Keppra for possible seizures in the past from her right MCA stroke.  4. Ulcerated atherosclerotic plaque left ICA, moderate stenosis 60% on CTA.\"   General Observations Patient in bed, texting on his phone upon arrival. Pleasant and cooperative. Daughter present.   Disability/Function   Hearing Difficulty or Deaf yes  (Patient endorses some hearing loss)   Type of Evaluation   Type of Evaluation Speech, Language, Cognition   Oral Motor   Oral Musculature generally intact   Structural Abnormalities none present   Mucosal Quality good   Facial Symmetry (Oral Motor)   Facial Symmetry (Oral Motor) WNL   Comment, Facial Symmetry (Oral Motor) No impairment noted   Speech   Speech Intelligibility (Motor Speech) WNL   Rate/Prosody (Motor Speech) WNL   Articulation (Motor Speech) WNL   Speech Fluency (Motor Speech) WNL   Comment, Motor Speech Assessment No impairment noted   Auditory Comprehension   Follows Commands (Auditory Comprehension) WFL;multi-step commands   Yes/No Questions (Auditory Comprehension) WFL;complex questions   Comment, Assessment (Auditory Comprehension) Patient required occasional repetition when responding to moderately complex questions and multi-step/grammatically complex questions. This appeared primarily related to hearing impairment as opposed to comprehension deficits.   Multi-Step, Follows Commands (Auditory Comprehension) over 90% accuracy;achieved with repetition   Complex Questions (Auditory Comprehension) over 90% accuracy;achieved with repetition   Verbal Expression "   Confrontational Naming (Verbal Expression) WNL;objects   Word Finding Skills (Verbal Expression) generative naming   Conversational Speech (Verbal Expression) WNL   Comment, Assessment (Verbal Expression) Patient demonstrated moderate difficulty with generative naming tasks. He was able to name 11 and 6 items in two concrete categories (animals and occupations, respectively). Improved somewhat with semantic cueing, but still below baseline. Patient endorses difficulty with word retrieval and needing increased time.   Generative Naming, Word Finding (Verbal Expression) impaired   Pragmatic Language   Nonverbal Skills (Pragmatic Language) WNL   Verbal Skills (Pragmatic Language) WNL   Comment, Assessment (Pragmatic Language) No impairment noted   Reading Comprehension   Comment, Assessment (Reading Comprehension) Reading comprehension was not formally assessed, however, patient stated that he has been reading texts and internet headlines/articles on his phone without difficulty.   Written Language   Comment, Assessment (Written Language) Written expression was not assessed during this session.   Cognition   Cognitive Status Patient was independently oriented to self, , age, type of building, name of hospital, state, city, month, date, day of week, year, and situation. He independently recalled 3/3 words both immediately and following a 3-minute delay with distractions.   Additional cognitive-linguistic evaluation indicated  Recommended   General Therapy Interventions   Planned Therapy Interventions Cognitive Treatment;Language   Intervention Comments Recommend targeting higher level word-finding skills (e.g., generative naming). Patient may also benefit from a more formal cognitive-linguistic evaluation (e.g., CLQT).   SLP Therapy Assessment/Plan   Criteria for Skilled Therapeutic Interventions Met (SLP Eval) yes;treatment indicated   SLP Diagnosis Mild expressive aphasia   Rehab Potential (SLP Eval) good, to  "achieve stated therapy goals   Therapy Frequency (SLP Eval) 5 times/wk   Predicted Duration of Therapy Intervention (SLP Eval) LOS   Comment, Therapy Assessment/Plan (SLP) Speech/Language evaluation completed per MD order. Patient presents with mild deficits in verbal expression. Increased difficulty noted with word retrieval, particularly during generative naming tasks. Patient reports having \"many different jobs\" and would like to resume these at discharge. Speech Therapy is indicated to address word retrieval abilities and to evaluate higher level cognition.   Therapy Plan Review/Discharge Plan (SLP)   Therapy Plan Review (SLP) evaluation/treatment results reviewed;care plan/treatment goals reviewed;participants voiced agreement with care plan;participants included;patient;daughter   SLP Discharge Planning    SLP Discharge Recommendation (DC Rec) home   SLP Rationale for DC Rec Patient presents with mild deficits in verbal expression. He endorses difficulty with word retrieval and needing increased time to think of words. Need for further Speech Therapy at discharge is uncertain at this time.    Total Evaluation Time   Total Evaluation Time (Minutes) 15     "
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
## 2021-11-05 NOTE — ANESTHESIA CARE TRANSFER NOTE
Patient: Jorge Young    Procedure: Procedure(s):  ENDARTERECTOMY, CAROTID; left with electroencephalogram and intraoperative ultrasound       Diagnosis: Symptomatic carotid artery stenosis with infarction (H) [I63.239]  Diagnosis Additional Information: No value filed.    Anesthesia Type:   General     Note:    Oropharynx: spontaneously breathing and oropharynx clear of all foreign objects  Level of Consciousness: awake  Oxygen Supplementation: face mask  Level of Supplemental Oxygen (L/min / FiO2): 6  Independent Airway: airway patency satisfactory and stable    Vital Signs Stable: post-procedure vital signs reviewed and stable  Report to RN Given: handoff report given  Patient transferred to: PACU  Comments: .  .  .  Patient mostly awake and alert in PACU.   BP parameters per surgeon are to maintain systolic between 110-180 mmHg.  Denies pain.  Denies nausea.  Appears very comfortable.    Handoff Report: Identifed the Patient, Identified the Reponsible Provider, Reviewed the pertinent medical history, Discussed the surgical course, Reviewed Intra-OP anesthesia mangement and issues during anesthesia, Set expectations for post-procedure period and Allowed opportunity for questions and acknowledgement of understanding      Vitals:  Vitals Value Taken Time   /58 11/04/21 1906   Temp 37.1    Pulse 64 11/04/21 1914   Resp 14 11/04/21 1914   SpO2 96 % 11/04/21 1914   Vitals shown include unvalidated device data.    Electronically Signed By: CASSIDY Goldman CRNA  November 4, 2021  7:14 PM

## 2021-11-05 NOTE — PROGRESS NOTES
Mr. Young is a 67yo male with symptomatic left internal carotid artery stenosis now POD1 s/p left carotid endarterectomy.     NAEON. Pain well controlled. Normotensive and not requiring pressors. Reports normal voice and normal swallowing. Has not been out of bed since surgery. Reports normal movement of extremities and normal sensation.    Vitals:    11/05/21 0430 11/05/21 0500 11/05/21 0530 11/05/21 0600   BP:       BP Location:       Cuff Size:       Pulse: 58 59 61 58   Resp: 17 12 12 12   Temp:       TempSrc:       SpO2: 100% 98% 99% 100%   Weight:         On exam he is awake, alert, and conversant. CN II-XII intact. 5/5 strength in BUE and BLE. 2+ radial pulses, regular. Left neck drain in place with about 15cc serosanguinous drainage in the drain. Left neck incision dressed with dermabond. Drain removed and no drainage or bleeding noted. Drain site dressed with foam tape and gauze.    Labs reviewed. Hgb drop from 14.8 to 12.8.    A&P: Mr. Young is a 67yo male with symptomatic >70% stenosis of the left ICA with ulcerated plaque now POD1 s/p left carotid endarterectomy, neuro intact  - continue aspirin 81mg, no indication for plavix from a vascular surgery perspective  - encourage best medical therapy with statin, glucose control, aspirin  - vascular surgery will sign off  - follow up with vascular surgery in one month with preclinic ultrasound    Marysol Lara MD  11/05/21  11:42 AM

## 2021-11-05 NOTE — PLAN OF CARE
Problem: Adult Inpatient Plan of Care  Goal: Plan of Care Review  Outcome: Improving     Problem: Cognitive Impairment (Stroke, Ischemic/Transient Ischemic Attack)  Goal: Optimal Cognitive Function  Outcome: Improving  Neuro's Q 4 hrs have been intact, no drift noted on bilateral arms, tongue deviated to left when asked to stick out, not new, Neuro MD aware.      Problem: Urinary Elimination Impaired (Stroke, Ischemic/Transient Ischemic Attack)  Goal: Effective Urinary Elimination  Outcome: Improving   Patient voided 350 ml this shift.     Oxycodone 5 mg po given once for incision pain. Patient also on scheduled tylenol.

## 2021-11-05 NOTE — ANESTHESIA POSTPROCEDURE EVALUATION
Patient: Jorge Young    Procedure: Procedure(s):  ENDARTERECTOMY, CAROTID; left with electroencephalogram and intraoperative ultrasound       Diagnosis:Symptomatic carotid artery stenosis with infarction (H) [I63.239]  Diagnosis Additional Information: No value filed.    Anesthesia Type:  General    Note:  Disposition: Admission; ICU            ICU Sign Out: Anesthesiologist/ICU physician sign out WAS performed   Postop Pain Control: Uneventful            Sign Out: Well controlled pain   PONV: No   Neuro/Psych: Uneventful            Sign Out: Acceptable/Baseline neuro status   Airway/Respiratory: Uneventful            Sign Out: Acceptable/Baseline resp. status   CV/Hemodynamics: Uneventful            Sign Out: Acceptable CV status; No obvious hypovolemia; No obvious fluid overload   Other NRE: NONE   DID A NON-ROUTINE EVENT OCCUR? No    Event details/Postop Comments:  Goal -140. No longer requiring vasopressor support. Sign-out provided to ICU physician.            Last vitals:  Vitals Value Taken Time   BP  11/04/21 2000   Temp 36.7  C (98.1  F) 11/04/21 1903   Pulse 54 11/04/21 2028   Resp 13 11/04/21 2028   SpO2 98 % 11/04/21 2028   Vitals shown include unvalidated device data.    Electronically Signed By: Susy Denney MD  November 4, 2021  9:17 PM

## 2021-11-05 NOTE — PROGRESS NOTES
VASCULAR SURGERY POST OP CHECK    Mr. Young is a 67yo male now POD0 s/p left carotid endarterectomy for symptomatic high grade stenosis. He denies pain. Per nurse, he has been off phenylephrine and maintaining blood pressures. No nausea.    On exam, vitals remain wnl. HR 60s, SBP 100s. Satting >95%. Resting comfortably in no distress. Voice normal, EOMI, face symmetric, slight leftward tongue deviation. Left neck incision dressed with dermabond, drain in place with sanguinous effluent, no evidence of hematoma. Strength 5/5 in BUE, BLE.    A&P: Mr. Young is a 67yo male with symptomatic left ICA stenosis now s/p L carotid endarterectomy, recovering on expected course  - goal of normotension  - plavix no longer indicated from a vascular surgery perspective now that thrombogenic plaque has been removed; plavix discontinued  - continue aspirin 81mg daily and atorvastatin 80mg daily      Marysol Lara MD  11/04/21  9:27 PM

## 2021-11-05 NOTE — PROGRESS NOTES
[facility]    PROGRESS NOTE - Hospitalist Service    Assessment and Plan    66 year old male admitted on 11/3/2021. He has a hx of RCA CVA, hx of post cva sz, dm, lipids, htn presents with acute onset aphasia today, confusion x 2 days, headache x 2 days. He has ruled in for acute CVA         Acute CVA   - History of Right CVA infarct in past  - Patient had behavior off x 2 days, headache 2 days process not just today  -outside window TPA  - CTA no acute cva, but ulcerative LICA  - Neurology consult, appreciate input  - MRI brain shows acute ischemia of left subinsular region of the left periventricular corona radiator  - has been on ASA-81 mg + Statin-high dose, likely need to add Plavix--both vascular and neuro agree, add Plavix 75 mg now  - Echo shows EF of 55 to 60%  - Improving neurological symptoms significantly today.  - Continue post stroke protocol.  - PT/OT and speech evaluation.     Acute ulcerative Left ICA  - carotid ultrasound shows stenosis more than 70%  - Vascular surgery consult, appreciate input  - S/P left carotid endarterectomy on 11/5/2021  - POD#1  - Postoperative orders as per vascular surgery  - Continue ASA, high dose statin, and add Plavix     History of seizure disorder  - Started months after last cva  - has been on Keppra, he is not sure if he took it yesterday  - son notes unusual behavior yesterday, ?sz vs cva  - continue Keppra-- and check level  - Unremarkable EEG with significant acute changes  - Neurology consult, appreciate input  - he does not drive due to sz hx     Diabetes mellitus type 2  - Hold home medications patient initially as he was n.p.o..  -cover with insulin sliding scale  -check A1c--last one 8/12/21 11.7, now today 9.4, still high joão with vascular dx  - likely needs insulin and not just po meds--start lantus tonight  -Diabetic education consult appreciate input    Accelerated hypertension  - Elevated blood pressure probably secondary to  his stroke  - allow permissive blood pressure for now as recommended by neurology  - Restart home medication today  - Continue with hydralazine IV as needed.       Hyperlipidemia  -Continue high-dose statin     Pseudo-Hyponatremia with hyperglycemia  -corrected NA is 137 when account for hyperglycemia    Active Problems:    Atherosclerosis of left carotid artery    Acute CVA (cerebrovascular accident) (H)    Symptomatic carotid artery stenosis with infarction (H)      VTE prophylaxis:  Pneumatic Compression Devices  DIET: Orders Placed This Encounter      Advance Diet as Tolerated: Regular Diet Adult; 6858-3121 Calories: Moderate Consistent CHO (4-6 CHO units/meal)      Disposition/Barriers to discharge: Post stroke protocol, postsurgical care for carotid endarterectomy adjust blood pressure medication.  Code Status: Full Code    Subjective:  Jorge is feeling much better today, no acute neurological changes overnight.  Denies any chest pain or shortness of breath.    PHYSICAL EXAM  Vitals:    11/03/21 1147   Weight: 89.8 kg (198 lb)     B/P:179/80 T:97.7 P:65 R:12     Intake/Output Summary (Last 24 hours) at 11/5/2021 1452  Last data filed at 11/5/2021 1000  Gross per 24 hour   Intake 4384.89 ml   Output 1080 ml   Net 3304.89 ml      Body mass index is 29.24 kg/m .    Constitutional: awake, alert, cooperative, no apparent distress, and appears stated age  Eyes: Lids and lashes normal, pupils equal, round and reactive to light, extra ocular muscles intact, sclera clear, conjunctiva normal  ENT: Normocephalic, without obvious abnormality, atraumatic, sinuses nontender on palpation, external ears without lesions, oral pharynx with moist mucous membranes, tonsils without erythema or exudates, gums normal and good dentition.  Respiratory: No increased work of breathing, good air exchange, clear to auscultation bilaterally, no crackles or wheezing  Cardiovascular: Normal apical impulse, regular rate and rhythm, normal S1  and S2, no S3 or S4, and no murmur noted  GI: No scars, normal bowel sounds, soft, non-distended, non-tender, no masses palpated, no hepatosplenomegally  Skin: no bruising or bleeding and normal skin color, texture, turgor  Musculoskeletal: There is no redness, warmth, or swelling of the joints.  Full range of motion noted.  no lower extremity pitting edema present  Neurologic: Awake, alert, oriented to name, place and time.  Cranial nerves II-XII are grossly intact.  Motor is 5 out of 5 bilaterally.   Sensory is intact.    Neuropsychiatric: Appropriate with examiner      PERTINENT LABS/IMAGING:  Recent Labs   Lab 11/05/21  1441 11/05/21  0842 11/05/21  0417 11/04/21  1947 11/04/21  1917 11/04/21  1835 11/04/21  0753 11/04/21  0744 11/03/21  1604 11/03/21  1218 11/03/21  1217   WBC  --   --  12.5*  --   --   --   --  8.1  --  10.8  --    HGB  --   --  12.8*  --   --   --   --  14.8  --  16.3  --    MCV  --   --  85  --   --   --   --  85  --  83  --    PLT  --   --  190  --  222  --   --  204   < > 219  --    INR  --   --   --   --   --   --   --   --   --   --  1.03   NA  --   --  136  --   --  137  --  136   < > 133*  --    POTASSIUM  --   --  4.2  --   --  4.1  --  3.8   < > 4.3  --    CHLORIDE  --   --  106  --   --  107  --  105   < > 97*  --    CO2  --   --  23  --   --  24  --  26   < > 26  --    BUN  --   --  20  --   --  19  --  19   < > 17  --    CR  --   --  0.93  --   --  0.92  --  0.93   < > 1.30  --    ANIONGAP  --   --  7  --   --  6  --  5   < > 10  --    GLENN  --   --  8.1*  --   --  7.8*  --  9.3   < > 9.6  --    * 107* 158*   < >  --  191*   < > 181*   < > 329*  --    ALBUMIN  --   --  2.9*  --   --   --   --   --   --   --   --    PROTTOTAL  --   --  6.0  --   --   --   --   --   --   --   --    BILITOTAL  --   --  0.7  --   --   --   --   --   --   --   --    ALKPHOS  --   --  54  --   --   --   --   --   --   --   --    ALT  --   --  15  --   --   --   --   --   --   --   --    AST  --    --  16  --   --   --   --   --   --   --   --     < > = values in this interval not displayed.     Recent Results (from the past 24 hour(s))   US Intraoperative    Narrative    EXAM: US INTRAOPERATIVE  LOCATION: [facility]  DATE/TIME: 11/4/2021 5:55 PM    INDICATION: Carotid Endarterectomy, intraoperative evaluation  COMPARISON: None.  TECHNIQUE: Duplex exam performed utilizing 2D gray-scale imaging, Doppler interrogation with color-flow and spectral waveform analysis. The percent diameter stenosis is determined using [place] criteria and Society of Radiologists in Ultrasound Consensus   Criteria.    FINDINGS:    LEFT: Limited images of the left carotid artery revealed brisk waveforms in the common and internal carotid artery as well as external carotid artery.   VELOCITY CHART:  CCA   Left: 56 cm/s  ICA   Left: 67 cm/s  ECA   Left: 54 cm/s        Impression    IMPRESSION:  1.  Patent left carotid artery where visualized.       Discussed with patient, family, neurology, nursing staff and discharge planner    Taurus Rojas MD  [facility] Medicine Service  [phone]

## 2021-11-05 NOTE — PROGRESS NOTES
"Speech Therapy - Bedside Swallow Study       11/05/21 1130   General Information   Onset of Illness/Injury or Date of Surgery 11/03/21   Referring Physician Dr. Auguste   Pertinent History of Current Problem Per Neurology progress note from this AM, \"1. Acute left MCA infarct with aphasia and confusion  2. Chronic right MCA infarct posterior right frontal and anterior right parietal lobes also involves the cortex  3. History of being on Keppra for possible seizures in the past from her right MCA stroke  4. Ulcerated atherosclerotic plaque left ICA, moderate stenosis 60% on CTA. Carotid Doppler, left ICA peak systolic velocity 459 cm/s, greater than 70% stenosis  5. Left carotid endarterectomy 11/4/2021 for ulcerated plaque with crescendo stroke.\"   Type of Evaluation   Type of Evaluation Swallow Evaluation   Oral Motor   Dentition (Oral Motor) adequate dentition   Facial Symmetry (Oral Motor)   Facial Symmetry (Oral Motor) left side impairment   Left Side Facial Asymmetry moderate impairment   Comment, Facial Symmetry (Oral Motor) Mild-moderate left facial droop noted. Reduced left nasolabial fold observed.   Lip Function (Oral Motor)   Lip Range of Motion (Oral Motor) retraction impairment   Retraction, Lip Range of Motion left side;moderate impairment   Lip Strength (Oral Motor) WNL   Comment, Lip Function (Oral Motor) Moderately reduced lip retraction (smile) on left.   Tongue Function (Oral Motor)   Tongue ROM (Oral Motor) lateralization is impaired   Protrusion, Tongue ROM Impairment (Oral Motor) left side  (Tongue deviates slightly to the left upon protrusion)   Lateralization, Tongue ROM Impairment (Oral Motor) bilateral;minimal impairment;moderate impairment   Tongue Coordination/Speed (Oral Motor) reduced rate   Comment, Tongue Function (Oral Motor) Lingual ROM and agility/coordination are mildly to moderately reduced.   Cough/Swallow/Gag Reflex (Oral Motor)   Volitional Throat Clear/Cough (Oral Motor) WNL " "  Volitional Swallow (Oral Motor) WNL   Vocal Quality/Secretion Management (Oral Motor)   Vocal Quality (Oral Motor) WNL   Comment, Vocal Quality/Secretion Management (Oral Motor) Patient's voice is strong and clear.   General Swallowing Observations   Current Diet/Method of Nutritional Intake (General Swallowing Observations, NIS) thin liquids (level 0);regular diet  (9580-0321 Calories: Moderate Consistent CHO)   Respiratory Support (General Swallowing Observations) none   Comment, General Swallowing Observations Patient denies difficulty chewing or swallowing since carotid endarterectomy, but states that both \"feel different\".   Swallowing Evaluation Clinical swallow evaluation   Clinical Swallow Evaluation   Feeding Assistance no assistance needed   Clinical Swallow Evaluation Textures Trialed thin liquids;solid foods   Clinical Swallow Eval: Thin Liquid Texture Trial   Mode of Presentation, Thin Liquids cup;straw;self-fed   Volume of Liquid or Food Presented 6 ounces   Oral Phase of Swallow WFL   Pharyngeal Phase of Swallow intact   Diagnostic Statement Swallow response seemed timely with thin liquid. No overt clinical s/sx of aspiration observed.   Clinical Swallow Evaluation: Solid Food Texture Trial   Mode of Presentation self-fed   Volume Presented Several bites  (Marco crackers)   Oral Phase WFL   Pharyngeal Phase intact   Diagnostic Statement Patient demonstrated effective and timely mastication of a regular texture solid. No overt clinical s/sx of aspiration observed. No significant oral stasis noted after swallows.   Esophageal Phase of Swallow   Patient reports or presents with symptoms of esophageal dysphagia No   Swallowing Recommendations   Diet Consistency Recommendations thin liquids (level 0);regular diet   Supervision Level for Intake patient independent   Mode of Delivery Recommendations bolus size, small;slow rate of intake   Recommended Feeding/Eating Techniques (Swallow Eval) maintain " upright sitting position for eating   Medication Administration Recommendations, Swallowing (SLP) Patient may take pills with liquid as tolerated. Recommend limiting to 1-2 at a time.   Instrumental Assessment Recommendations instrumental evaluation not recommended at this time   SLP Therapy Assessment/Plan   Criteria for Skilled Therapeutic Interventions Met (SLP Eval) does not meet criteria for skilled intervention   Therapy Frequency (SLP Eval) evaluation only   Comment, Therapy Assessment/Plan (SLP) Bedside swallow study completed. Patient presents with no oral dysphagia. Pharyngeal dysphagia is not suspected due to absence of clinical signs and symptoms of aspiration. Patient appears appropriate to continue current diet. Further Speech Therapy for swallow is not indicated at this time, however, please notify Speech if new concerns arise. If silent aspiration is a concern, consider VFSS. Speech will continue to follow for cognitive-linguistic assessment & treatment as indicated.   Therapy Plan Review/Discharge Plan (SLP)   Therapy Plan Review (SLP) evaluation/treatment results reviewed;participants voiced agreement with care plan;participants included;patient    Total Evaluation Time   Total Evaluation Time (Minutes) 20

## 2021-11-06 NOTE — DISCHARGE SUMMARY
[facility]  Hospitalist Discharge Summary      Date of Admission:  11/3/2021  Date of Discharge:  11/6/2021  Discharging Provider: Taurus Rojas MD      Discharge Diagnoses   Acute CVA  Acute ulcerative left ICA s/p left carotid endarterectomy  Seizure disorder  Diabetes mellitus type 2  Accelerated hypertension, improved  Hyperlipidemia    Follow-ups Needed After Discharge   Follow-up Appointments     Follow-up and recommended labs and tests       Follow up with Dr. Frazier as scheduled with left carotid duplex prior.    The clinic will call to schedule this.         Follow-up and recommended labs and tests       Follow up with primary care provider, Ashley Narvaez, within 7 days   for hospital follow- up.  No follow up labs or test are needed.    Follow up with neurology and vascular surgery as scheduled             Unresulted Labs Ordered in the Past 30 Days of this Admission     Date and Time Order Name Status Description    11/4/2021  5:17 PM Surgical Pathology Exam In process       These results will be followed up by PCP    Discharge Disposition   Discharged to home  Condition at discharge: Stable      Hospital Course   66 year old male past medical history of RCA CVA, hx of post cva sz, dm, lipids, htn presents with acute onset aphasia today, confusion x 2 days, headache x 2 days.,  He was admitted with acute CVA, please refer to H&P for details        Acute CVA   - History of Right CVA infarct in past  - Patient had behavior off x 2 days, headache 2 days process not just today  -outside window TPA  - CTA no acute cva, but ulcerative LICA  - Neurology consult, appreciate input  - MRI brain shows acute ischemia of left subinsular region of the left periventricular corona radiator  - has been on ASA-81 mg + Statin-high dose, likely need to add Plavix--both vascular and neuro agree, add Plavix 75 mg now  - Echo shows EF of 55 to 60%  - Improving neurological symptoms  significantly today.  - Continue post stroke protocol.  - PT/OT and speech evaluation.  - Continue Plavix for 30 days as recommended by neurology  -Stable from discharge, follow-up with neurology as outpatient     Acute ulcerative Left ICA  - carotid ultrasound shows stenosis more than 70%  - Vascular surgery consult, appreciate input  - S/P left carotid endarterectomy on 11/5/2021  - POD#2  - Postoperative orders as per vascular surgery  - Continue ASA, high dose statin, and add Plavix for 30 days as recommended by neurology     History of seizure disorder  - Started months after last cva  - has been on Keppra, he is not sure if he took it yesterday  - son notes unusual behavior yesterday, ?sz vs cva  - continue Keppra-- and check level  - Unremarkable EEG with significant acute changes  - Neurology consult, appreciate input  - he does not drive due to sz hx     Diabetes mellitus type 2  - Hold home medications patient initially as he was n.p.o..  -cover with insulin sliding scale  -check A1c--last one 8/12/21 11.7, now today 9.4, still high joão with vascular dx  - likely needs insulin and not just po meds--start lantus tonight  - Patient declined insulin on discharge, resume Metformin, glipizide and Actos  - Diabetic education consult appreciate input     Accelerated hypertension  - Elevated blood pressure probably secondary to his stroke  - allow permissive blood pressure for now as recommended by neurology  - Restart home medication today  - Stable blood pressure before discharge      Hyperlipidemia  - Continue high-dose statin     Pseudo-Hyponatremia with hyperglycemia  - Resolved    Consultations This Hospital Stay   NEUROLOGY IP CONSULT  VASCULAR SURGERY IP CONSULT  SPEECH LANGUAGE PATH ADULT IP CONSULT  PHARMACY IP CONSULT  PHARMACY IP CONSULT  PHARMACY IP CONSULT  PHYSICAL THERAPY ADULT IP CONSULT  OCCUPATIONAL THERAPY ADULT IP CONSULT  REHAB ADMISSIONS LIAISON IP CONSULT  CARE MANAGEMENT / SOCIAL WORK IP  CONSULT  NEUROLOGY IP CONSULT  DIABETES EDUCATION IP CONSULT  SOCIAL WORK IP CONSULT    Code Status   Full Code    Time Spent on this Encounter   I, Taurus Rojas MD, personally saw the patient today and spent greater than 30 minutes discharging this patient.       Taurus Rojas MD  [address] [address]  Phone: [phone]  Fax: [phone]  ______________________________________________________________________    Physical Exam   Vital Signs: Temp: 98.7  F (37.1  C) Temp src: Oral BP: (!) 145/76 Pulse: 92   Resp: 16 SpO2: 95 % O2 Device: None (Room air)    Weight: 198 lbs 0 oz  Constitutional: awake, alert, cooperative, no apparent distress, and appears stated age  Eyes: Lids and lashes normal, pupils equal, round and reactive to light, extra ocular muscles intact, sclera clear, conjunctiva normal  ENT: Normocephalic, without obvious abnormality, atraumatic, sinuses nontender on palpation, external ears without lesions, oral pharynx with moist mucous membranes, tonsils without erythema or exudates, gums normal and good dentition.  Respiratory: No increased work of breathing, good air exchange, clear to auscultation bilaterally, no crackles or wheezing  Cardiovascular: Normal apical impulse, regular rate and rhythm, normal S1 and S2, no S3 or S4, and no murmur noted  GI: No scars, normal bowel sounds, soft, non-distended, non-tender, no masses palpated, no hepatosplenomegally  Skin: no bruising or bleeding and normal skin color, texture, turgor  Musculoskeletal: There is no redness, warmth, or swelling of the joints.  Full range of motion noted.  no lower extremity pitting edema present  Neurologic: Awake, alert, oriented to name, place and time.  Cranial nerves II-XII are grossly intact.  Motor is 5 out of 5 bilaterally.   Sensory is intact.    Neuropsychiatric: Appropriate with examiner       Primary Care Physician   Ashley JACK  Brice    Discharge Orders      Reason for your hospital stay    Left carotid endarterectomy     Follow-up and recommended labs and tests     Follow up with Dr. Frazier as scheduled with left carotid duplex prior.  The clinic will call to schedule this.     Activity    Your activity upon discharge: activity as tolerated, no lifting more than 10 lbs for 2 weeks.     Reason for your hospital stay    Acute CVA     Follow-up and recommended labs and tests     Follow up with primary care provider, Ashley Narvaez, within 7 days for hospital follow- up.  No follow up labs or test are needed.    Follow up with neurology and vascular surgery as scheduled     Activity    Your activity upon discharge: activity as tolerated     Diet    Follow this diet upon discharge: Orders Placed This Encounter     Diabetic diet Moderate Consistent CHO (4-6 CHO units/meal)       Significant Results and Procedures   Most Recent 3 CBC's:Recent Labs   Lab Test 11/06/21  0612 11/05/21  0417 11/04/21  1917 11/04/21  0744 11/04/21  0744 11/03/21  1218 11/03/21  1218   WBC  --  12.5*  --   --  8.1  --  10.8   HGB  --  12.8*  --   --  14.8  --  16.3   MCV  --  85  --   --  85  --  83    190 222   < > 204   < > 219    < > = values in this interval not displayed.     Most Recent 3 BMP's:Recent Labs   Lab Test 11/06/21  0755 11/05/21  2200 11/05/21  1829 11/05/21  0842 11/05/21  0417 11/04/21  1947 11/04/21  1835 11/04/21  0753 11/04/21  0744   NA  --   --   --   --  136  --  137  --  136   POTASSIUM  --   --   --   --  4.2  --  4.1  --  3.8   CHLORIDE  --   --   --   --  106  --  107  --  105   CO2  --   --   --   --  23  --  24  --  26   BUN  --   --   --   --  20  --  19  --  19   CR  --   --   --   --  0.93  --  0.92  --  0.93   ANIONGAP  --   --   --   --  7  --  6  --  5   GLENN  --   --   --   --  8.1*  --  7.8*  --  9.3   * 213* 168*   < > 158*   < > 191*   < > 181*    < > = values in this interval not displayed.   ,    Results for orders placed or performed during the hospital encounter of 11/03/21   CTA Head Neck with Contrast    Narrative    EXAM: CTA  HEAD NECK WITH CONTRAST  LOCATION: [facility]  DATE/TIME: 11/3/2021 12:01 PM    INDICATION: Code Stroke to evaluate for potential thrombolysis and thrombectomy.  PLEASE READ IMMEDIATELY.  COMPARISON: CTA head/neck 06/27/2021.  CONTRAST: [id number] 75ml  TECHNIQUE: Head and neck CT angiogram with IV contrast. Noncontrast head CT followed by axial helical CT images of the head and neck vessels obtained during the arterial phase of intravenous contrast administration. Axial 2D reconstructed images and   multiplanar 3D MIP reconstructed images of the head and neck vessels were performed by the technologist. Dose reduction techniques were used. All stenosis measurements made according to NASCET criteria unless otherwise specified.    FINDINGS:   NONCONTRAST HEAD CT:   INTRACRANIAL CONTENTS: There is no evidence of acute intracranial hemorrhage, acute large territorial infarction, or mass lesion. Encephalomalacia in the posterior right frontal lobe and anterior right parietal lobe appears unchanged from the prior   study. Moderate patchy nonspecific hypodensity in the cerebral white matter appears stable from the prior study.    Mild prominence of the ventricles, sulci, and cisterns consistent with mild diffuse brain parenchymal volume loss appears stable from the prior study. There is no evidence of hydrocephalus.    VISUALIZED ORBITS/SINUSES/MASTOIDS: No intraorbital abnormality. No paranasal sinus mucosal disease. No middle ear or mastoid effusion.    BONES/SOFT TISSUES: No acute abnormality.    HEAD CTA:    There is no evidence of proximal large vessel occlusion, cerebral aneurysm, high flow vascular malformation, or significant intracranial arterial stenosis.    The major dural venous sinuses are widely patent.    NECK CTA:    There is a two-vessel,  left-sided aortic arch with common origin of the brachiocephalic and left common carotid arteries, a normal congenital variant. There is no significant stenosis of the brachiocephalic or subclavian arteries bilaterally.    There is no significant stenosis of the right common carotid artery. There is predominantly noncalcified plaque at the right carotid bifurcation resulting in unchanged mild stenosis in the proximal cervical right internal carotid artery (approximately   20%).    There is no significant stenosis of the left common carotid artery. Extensive calcified and noncalcified atherosclerotic plaque in the proximal cervical left internal carotid artery results in moderate stenosis in the proximal cervical left internal   carotid artery (approximately 60%). Ulcerated plaque is seen along the dorsal aspect of the proximal cervical left internal carotid artery. The findings have progressed since the prior head and neck CTA dated 06/27/2021.    The vertebral arteries are codominant. There is unchanged mild stenosis at the origin of the right vertebral artery. The remainder of the extradural right vertebral artery is widely patent. There is no significant stenosis of the extradural left   vertebral artery. There is no evidence of vertebral artery dissection.    The visualized lungs are clear bilaterally. The thyroid gland is unremarkable. There is stable multilevel degenerative change in the cervical spine.      Impression    IMPRESSION:     HEAD CT:  1.  No CT evidence of acute intercranial hemorrhage, acute large territorial infarction, or mass lesion.  2.  Stable chronic right MCA territory infarct involving the posterior right frontal and anterior right parietal lobes.  3.  Stable moderate chronic small vessel ischemic change and mild diffuse brain parenchymal volume loss.    HEAD CTA:   1.  No proximal large vessel occlusion.  2.  No evidence of cerebral aneurysm, high flow vascular malformation, or  significant intracranial arterial stenosis.    NECK CTA:  1.  Ulcerated atherosclerotic plaque in the proximal cervical left internal carotid artery results in moderate stenosis (60%). The degree of stenosis has increased since the prior head and neck CTA dated 06/27/2021.  2.  Stable mild stenosis in the proximal cervical right internal carotid artery (approximately 20%).  3.  Stable mild stenosis at the origin of the right vertebral artery.    The above findings were discussed with Dr. Brayden stover at: 12:09 PM (noncontrast head CT) and 12:30 PM (CTA head/neck) on 11/3/2021.    MR Brain w/o Contrast    Narrative    EXAM: MR BRAIN W/O CONTRAST  LOCATION: [facility]  DATE/TIME: 11/3/2021 1:20 PM    INDICATION: Weakness. Stroke evaluation.  COMPARISON: CTA of the head and neck dated 11/03/2021  TECHNIQUE: Routine multiplanar multisequence head MRI without intravenous contrast.    FINDINGS:  INTRACRANIAL CONTENTS: Foci of acute ischemia in the left subinsular region and left periventricular corona radiata superimposed upon a background of moderate chronic microangiopathy and chronic infarct in the right frontoparietal lobe No mass, acute   hemorrhage, or extra-axial fluid collections. Mild generalized cerebral volume loss without hydrocephalus. Normal position of the cerebellar tonsils.     SELLA: No abnormality accounting for technique.    OSSEOUS STRUCTURES/SOFT TISSUES: Normal marrow signal. The major intracranial vascular flow voids are maintained.     ORBITS: No abnormality accounting for technique.     SINUSES/MASTOIDS: No paranasal sinus mucosal disease. No middle ear or mastoid effusion.       Impression    IMPRESSION:    1.  Foci of acute ischemia in the left subinsular region and left periventricular corona radiata superimposed upon a background of moderate chronic microangiopathy and chronic infarct in the right frontoparietal lobe.    - - - - - - - - - - - - - - - - - - - -  - - - - - - - - - - - - - - - - - - - - - - - - - - - - - - - - - - - - - - - - - - - - - - - - - - - - - - - -   The results above were discussed with Dr. Haq on 11/3/2021 1:58 PM by Dr. Fabian Garcia.  - - - - - - - - - - - - - - - - - - - - - - - - - - - - - - - - - - - - - - - - - - - - - - - - - - - - - - - - - - - - - - - - - - - - - - - - - - - -   US Carotid Bilateral    Narrative    EXAM: US CAROTID BILATERAL  LOCATION: [facility]  DATE/TIME: 11/3/2021 4:13 PM    INDICATION: 65 y/o man with acute CVA now, concern left ICA with possible ulcerative plaque  COMPARISON: Same day CTA  TECHNIQUE: Duplex exam performed utilizing 2D gray-scale imaging, Doppler interrogation with color-flow and spectral waveform analysis. The percent diameter stenosis is determined using NASCET criteria and Society of Radiologists in Ultrasound Consensus   Criteria.    FINDINGS:    RIGHT: Mild plaque at the bifurcation. The peak systolic velocity in the ICA is less than 125 cm/sec, consistent with less than 50% stenosis. Normal velocities in the ECA. Antegrade flow within the vertebral artery.     LEFT: Severe plaque at the bifurcation. The peak systolic velocity in the ICA is greater than 230 cm/sec, consistent with greater than 70% stenosis. Normal velocities in the ECA. Antegrade flow within the vertebral artery.    VELOCITY CHART:  CCA   Right: 77 cm/s   Left: 61 cm/s  ICA   Right: 123 cm/s   Left: 459 cm/s  ECA   Right: 124 cm/s   Left: 99 cm/s  ICA/CCA PSV Ratio   Right: 1.59   Left: 7.55      Impression    IMPRESSION:  1.  Mild plaque formation, velocities consistent with less than 50% stenosis in the right internal carotid artery.  2.  Severe plaque formation, velocities consistent with greater than 70% stenosis in the left internal carotid artery.  3.  Flow within the vertebral arteries is antegrade.   US Intraoperative    Narrative    EXAM: US INTRAOPERATIVE  LOCATION: [facility]  DATE/TIME: 2021 5:55 PM    INDICATION: Carotid Endarterectomy, intraoperative evaluation  COMPARISON: None.  TECHNIQUE: Duplex exam performed utilizing 2D gray-scale imaging, Doppler interrogation with color-flow and spectral waveform analysis. The percent diameter stenosis is determined using [place] criteria and Society of Radiologists in Ultrasound Consensus   Criteria.    FINDINGS:    LEFT: Limited images of the left carotid artery revealed brisk waveforms in the common and internal carotid artery as well as external carotid artery.   VELOCITY CHART:  CCA   Left: 56 cm/s  ICA   Left: 67 cm/s  ECA   Left: 54 cm/s        Impression    IMPRESSION:  1.  Patent left carotid artery where visualized.   Echocardiogram Complete     Value    LVEF  55-60%    Narrative    [id number]  [id number]  [id number]  [id number]^IRA^LUIS^[place] [address]     Name: ECTOR MCFADDEN  MRN: [id number]  : 1955  Study Date: 2021 06:41 AM  Age: 66 yrs  Gender: Male  Patient Location: [facility]  Reason For Study: TIA  Ordering Physician: LUIS ROTH  Performed By: VIRA     BSA: 2.1 m2  Height: 69 in  Weight: 198 lb  HR: 67  ______________________________________________________________________________  Procedure  No hemodynamically significant valvular abnormalities on 2D or color flow  imaging. There is no comparison study available.  ______________________________________________________________________________  Interpretation Summary     1.Left ventricular size, wall motion and function are normal. The ejection  fraction is 55-60%.  2.TAPSE is normal, which is consistent with normal right ventricular systolic  function.  3.No hemodynamically significant valvular abnormalities on 2D or color flow  imaging.  4.No obvious embolic source seen.  5.There is no comparison study  available.  ______________________________________________________________________________  I      WMSI = 1.00     % Normal = 100     X - Cannot   0 -                      (2) - Mildly 2 -          Segments  Size  Interpret    Hyperkinetic 1 - Normal  Hypokinetic  Hypokinetic  1-2     small                                                     7 -          3-5      moderate  3 - Akinetic 4 -          5 -         6 - Akinetic Dyskinetic   6-14    large               Dyskinetic   Aneurysmal  w/scar       w/scar       15-16   diffuse     Left Ventricle  Left ventricular size, wall motion and function are normal. The ejection  fraction is 55-60%. There is normal left ventricular wall thickness. Left  ventricular diastolic function is normal. No regional wall motion  abnormalities noted.     Right Ventricle  Normal right ventricle size and systolic function. TAPSE is normal, which is  consistent with normal right ventricular systolic function.     Atria  Normal left atrial size. Right atrial size is normal. There is no color  Doppler evidence of an atrial shunt.     Mitral Valve  Mitral valve leaflets appear normal. There is no evidence of mitral stenosis  or clinically significant mitral regurgitation. There is no mitral  regurgitation noted. There is no mitral valve stenosis.     Tricuspid Valve  Tricuspid valve leaflets appear normal. Right ventricle systolic pressure  estimate normal. There is physiologic tricuspid regurgitation. There is no  tricuspid stenosis.     Aortic Valve  The aortic valve is trileaflet. Thickened aortic valve leaflets. No aortic  regurgitation is present. No aortic stenosis is present.     Pulmonic Valve  The pulmonic valve is not well seen, but is grossly normal. This degree of  valvular regurgitation is within normal limits. There is no pulmonic valvular  stenosis.     Vessels  The aorta root is normal. Normal size ascending aorta. IVC diameter <2.1 cm  collapsing >50% with sniff suggests a  normal RA pressure of 3 mmHg.     Pericardium  There is no pericardial effusion.     Rhythm  Sinus rhythm was noted.     ______________________________________________________________________________  MMode/2D Measurements & Calculations  IVSd: 1.4 cm  LVIDd: 4.2 cm  LVIDs: 2.8 cm  LVPWd: 1.5 cm     FS: 32.5 %  LV mass(C)d: 237.1 grams  LV mass(C)dI: 115.3 grams/m2  Ao root diam: 3.4 cm  LA dimension: 3.5 cm  asc Aorta Diam: 3.7 cm  LA/Ao: 1.0  LVOT diam: 2.0 cm  LVOT area: 3.0 cm2  LA Volume Indexed (AL/bp): 36.1 ml/m2  RWT: 0.72     Doppler Measurements & Calculations  MV E max marvin: 74.2 cm/sec  MV A max marvin: 69.8 cm/sec  MV E/A: 1.1     MV dec slope: 334.6 cm/sec2  MV dec time: 0.22 sec  Ao V2 max: 129.0 cm/sec  Ao max P.0 mmHg  Ao V2 mean: 87.1 cm/sec  Ao mean PG: 3.5 mmHg  Ao V2 VTI: 25.8 cm  VISH(I,D): 2.9 cm2  VISH(V,D): 2.5 cm2  LV V1 max P.5 mmHg  LV V1 max: 106.2 cm/sec  LV V1 VTI: 24.2 cm  SV(LVOT): 73.9 ml  SI(LVOT): 35.9 ml/m2  PA acc time: 0.11 sec  PI end-d marvin: 101.8 cm/sec  TR max marvin: 221.3 cm/sec  TR max P.6 mmHg  AV Marvin Ratio (DI): 0.82  VISH Index (cm2/m2): 1.4  E/E' av.3  Lateral E/e': 9.5  Medial E/e': 13.1     ______________________________________________________________________________  Report approved by: Kasandra Yoo 2021 08:23 AM               Discharge Medications   Current Discharge Medication List      START taking these medications    Details   acetaminophen (TYLENOL) 325 MG tablet Take 2 tablets (650 mg) by mouth every 4 hours as needed for other (For optimal non-opioid multimodal pain management to improve pain control.)    Associated Diagnoses: Acute post-operative pain      clopidogrel (PLAVIX) 75 MG tablet Take 1 tablet (75 mg) by mouth daily  Qty: 30 tablet, Refills: 0    Associated Diagnoses: Acute CVA (cerebrovascular accident) (H)      oxyCODONE (ROXICODONE) 5 MG tablet Take 1 tablet (5 mg) by mouth every 4 hours as needed for moderate to severe  pain  Qty: 5 tablet, Refills: 0    Associated Diagnoses: Acute post-operative pain         CONTINUE these medications which have NOT CHANGED    Details   aspirin (ASA) 81 MG EC tablet Take 1 tablet (81 mg) by mouth daily  Qty: 100 tablet, Refills: 3    Associated Diagnoses: Uncontrolled type 2 diabetes mellitus with hyperglycemia (H); Mixed hyperlipidemia; Acute embolic stroke (H)      atorvastatin (LIPITOR) 80 MG tablet Take 1 tablet (80 mg) by mouth daily  Qty: 90 tablet, Refills: 3    Associated Diagnoses: Mixed hyperlipidemia      glipiZIDE (GLUCOTROL XL) 10 MG 24 hr tablet Take 1 tablet (10 mg) by mouth daily  Qty: 90 tablet, Refills: 3    Associated Diagnoses: Uncontrolled type 2 diabetes mellitus with hyperglycemia (H)      hydrochlorothiazide (HYDRODIURIL) 50 MG tablet Take 1 tablet (50 mg) by mouth daily  Qty: 90 tablet, Refills: 3    Associated Diagnoses: Acute embolic stroke (H); Essential hypertension      levETIRAcetam (KEPPRA) 750 MG tablet Take 750 mg by mouth 2 times daily      losartan (COZAAR) 50 MG tablet Take 1 tablet (50 mg) by mouth daily  Qty: 90 tablet, Refills: 3    Associated Diagnoses: Uncontrolled type 2 diabetes mellitus with hyperglycemia (H); Acute embolic stroke (H); Essential hypertension      multivitamin w/minerals (THERA-VIT-M) tablet Take 1 tablet by mouth daily      pioglitazone (ACTOS) 30 MG tablet Take one tablet daily by mouth.  Qty: 30 tablet, Refills: 1    Associated Diagnoses: Uncontrolled type 2 diabetes mellitus with hyperglycemia (H)      polyethylene glycol-propylene glycol (SYSTANE ULTRA) 0.4-0.3 % SOLN ophthalmic solution Place 1 drop into both eyes every hour as needed for dry eyes      ACCU-CHEK GUIDE test strip Use to test blood sugar 1 time daily.  Qty: 50 strip, Refills: 11    Associated Diagnoses: Uncontrolled type 2 diabetes mellitus with hyperglycemia (H)      blood glucose monitoring (SOFTCLIX) lancets Use to test blood sugar 1 time daily.  Qty: 100 each,  Refills: 4    Associated Diagnoses: Uncontrolled type 2 diabetes mellitus with hyperglycemia (H)      Blood Glucose Monitoring Suppl (ACCU-CHEK GUIDE ME) w/Device KIT 1 each daily  Qty: 1 kit, Refills: 0    Associated Diagnoses: Uncontrolled type 2 diabetes mellitus with hyperglycemia (H)      Continuous Blood Gluc  (FREESTYLE MICHELLE READER) MICHAEL 1 each every 8 hours Use reader to scan sensor a minimum of every 8 hours.  Qty: 1 each, Refills: 0    Associated Diagnoses: Uncontrolled type 2 diabetes mellitus with hyperglycemia (H)      Continuous Blood Gluc Sensor (FREESTYLE MICHELLE 14 DAY SENSOR) MISC 1 each every 14 days Change every 14 days.  Qty: 2 each, Refills: 5    Associated Diagnoses: Uncontrolled type 2 diabetes mellitus with hyperglycemia (H)           Allergies   Allergies   Allergen Reactions     Novocain [Procaine] Other (See Comments)     Tiredness

## 2021-11-06 NOTE — PLAN OF CARE
"  Problem: Adult Inpatient Plan of Care  Goal: Plan of Care Review  Outcome: Improving     Problem: Adult Inpatient Plan of Care  Goal: Absence of Hospital-Acquired Illness or Injury  Outcome: Improving     Problem: Functional Ability Impaired (Stroke, Ischemic/Transient Ischemic Attack)  Goal: Optimal Functional Ability  Outcome: Improving  Intervention: Optimize Functional Ability  Recent Flowsheet Documentation  Taken 11/6/2021 0120 by Shanika Morrison RN  Activity Management: ambulated to bathroom   Medicated with PRN Hydralazine for /84. Last BP was 158/82. Patient is alert and oriented and makes his needs known. Frequent checks and call light within reach. Seizure pads applied on bed after explaining to patient reason for pads. Dressing left neck intact. Patient reported minimal pain \"2\"  "

## 2021-11-06 NOTE — CONSULTS
Care Management Discharge Note    Discharge Date: 11/06/2021       Discharge Disposition: Home    Discharge Services: None    Discharge DME: None    Discharge Transportation: family or friend will provide    Private pay costs discussed: Not applicable    PAS Confirmation Code:  Not applicable  Patient/family educated on Medicare website which has current facility and service quality ratings:  Not applicable    Education Provided on the Discharge Plan:  Yes, per treatment team  Persons Notified of Discharge Plans: Patient  Patient/Family in Agreement with the Plan: yes    Handoff Referral Completed: Not applicable    Additional Information:    Pt discharging home with family.  No CM needs identified. Family transporting.       DANIEL Pacheco

## 2021-11-06 NOTE — PLAN OF CARE
Problem: Adult Inpatient Plan of Care  Goal: Plan of Care Review  Outcome: Improving     Problem: Adjustment to Illness (Stroke, Ischemic/Transient Ischemic Attack)  Goal: Optimal Coping  Outcome: Improving     Problem: Communication Impairment (Stroke, Ischemic/Transient Ischemic Attack)  Goal: Improved Communication Skills  Outcome: Improving     Problem: Functional Ability Impaired (Stroke, Ischemic/Transient Ischemic Attack)  Goal: Optimal Functional Ability  Outcome: Improving   Pt is a transfer from the ICU,pt is alert and oriented.PRN tylenol given for neck pain associated with activity and effective.Dressing clean dry and intact.Call light within reach.

## 2021-11-06 NOTE — PLAN OF CARE
Pt discharged in stable condition. Discharge education completed with patient. Medications and belongings sent home with pt.

## 2021-11-06 NOTE — PROGRESS NOTES
Transferred patient to room 407.  Patients cell phone, glasses, denture/tooth and clothing sent with patient.

## 2021-11-06 NOTE — PROGRESS NOTES
NEUROLOGY INPATIENT PROGRESS NOTE       [facility] NEUROLOGY [place]  [address] [place] [address]  Tel: [phone]  Fax: [phone]  [email or web address]     ASSESSMENT & PLAN   Hospital Day#: 3  Visit diagnosis: Cerebral infarction    Left subinsular, left periventricular white matter embolic infarct; s/p left carotid endarterectomy  66 years old male with history of HTN, DM, HLD, seizures admitted on 11/3/2021 with confusion and aphasia. 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 CTA showed ulcerated plaque in the proximal left ICA resulting in a 60% stenosis. Right ICA showed 20% stenosis.  MRI scan showed focal area of ischemia in the left subinsular region and left periventricular corona radiata superimposed upon a background of moderate chronic microangiopathy  Echocardiogram shows a normal ejection fraction, normal right ventricular systolic function. No hemodynamically significant valvular heart abnormality.  Lab work includes , hemoglobin A1c 9.4 and Keppra level 9.4  Patient status post left carotid endarterectomy on 11/4/2021 for ulcerated plaque  Patient on aspirin and Lipitor. Keep LDL less than 70. If okay with vascular surgery I would recommend adding Plavix for 30 days. Afterwards he can continue on aspirin monotherapy  Physical, occupational, speech consult  EEG showed some dysrhythmia in the left hemisphere but no sharp activity.    Neurology Discharge Planning :   FINESSE Alex MD  [facility]  (Formerly, [facility]     PROBLEM LIST      Patient Active Problem List   Diagnosis Code     Acute embolic stroke (H) I63.9     Uncontrolled type 2 diabetes mellitus (H) E11.65     HTN (hypertension) I10     Hyperlipidemia E78.5     Proteinuria R80.9     Seizure disorder as  sequela of cerebrovascular accident (H) I69.398, G40.909     Type 2 diabetes mellitus with both eyes affected by moderate nonproliferative retinopathy and macular edema, without long-term current use of insulin (H) E11.3313     Diabetic macular edema of both eyes (H) E11.311     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     Syncope, unspecified syncope type R55     Seizure (H) R56.9     History of seizure Z87.898     Lab test negative for COVID-19 virus Z20.822     Aphasia R47.01     History of embolic stroke Z86.73     Acute encephalopathy G93.40     Encounter for screening laboratory testing for severe acute respiratory syndrome coronavirus 2 (SARS-CoV-2) Z20.822     Left facial numbness R20.0     Left hand weakness R29.898     Atherosclerosis of left carotid artery I65.22     Acute CVA (cerebrovascular accident) (H) I63.9     Symptomatic carotid artery stenosis with infarction (H) I63.239       Past Medical History:   Patient  has a past medical history of Benign essential hypertension, COVID-19 virus infection, Diabetes mellitus (H), Hyperlipidemia LDL goal <100, Left facial numbness (07/16/2020), Left hand weakness (07/16/2020), Seizures (H), and Stroke (H).     SUBJECTIVE     Patient alert talking on phone with family members.  Complains of slurred speech and has difficulty with certain words.     OBJECTIVE     Vital signs in last 24 hours  Temp:  [97.7  F (36.5  C)-99.1  F (37.3  C)] 97.7  F (36.5  C)  Pulse:  [52-72] 68  Resp:  [12-25] 16  BP: (130-179)/(59-85) 175/84  MAP:  [82 mmHg-121 mmHg] 101 mmHg  Arterial Line BP: (121-180)/(59-83) 157/69  SpO2:  [93 %-100 %] 98 %    Weight:   Wt Readings from Last 1 Encounters:   11/03/21 89.8 kg (198 lb)         I/O last 24 hours    Intake/Output Summary (Last 24 hours) at 11/6/2021 0635  Last data filed at 11/5/2021 2030  Gross per 24 hour   Intake 886.8 ml   Output 1250 ml   Net -363.2 ml       Review of Systems   Pertinent items are  noted in HPI.    General Physical Exam: Patient is alert and oriented x 3. Vital signs were reviewed and are documented in EMR. Neck was supple, no Carotid bruit, thyromegaly, JVD or lymphadenopathy noted.  Neurological Exam:  Patient is alert and oriented x3 speech dysarthric, no aphasia. Able to name objects parts of objects repetition intact no aphasia. Cranial nerves II through XII are intact. No pronator drift. Strength 5/5. Reflexes 1+, absent at ankle, toes equivocal. No dysmetria noted on finger-nose testing. Sensation decreased below mid legs bilaterally gait testing deferred     DIAGNOSTIC STUDIES     Pertinent Radiology   Following imaging studies were reviewed:    CT  HEAD CT:   1.  No CT evidence of acute intercranial hemorrhage, acute large territorial infarction, or mass lesion.   2.  Stable chronic right MCA territory infarct involving the posterior right frontal and anterior right parietal lobes.   3.  Stable moderate chronic small vessel ischemic change and mild diffuse brain parenchymal volume loss.     HEAD CTA:   1.  No proximal large vessel occlusion.   2.  No evidence of cerebral aneurysm, high flow vascular malformation, or significant intracranial arterial stenosis.     NECK CTA:   1.  Ulcerated atherosclerotic plaque in the proximal cervical left internal carotid artery results in moderate stenosis (60%). The degree of stenosis has increased since the prior head and neck CTA dated 06/27/2021.   2.  Stable mild stenosis in the proximal cervical right internal carotid artery (approximately 20%).   3.  Stable mild stenosis at the origin of the right vertebral artery.    MRI  1.  Foci of acute ischemia in the left subinsular region and left periventricular corona radiata superimposed upon a background of moderate chronic microangiopathy and chronic infarct in the right frontoparietal lobe.     EEG  9-10 Hz percent rhythm better modulated in the right hemisphere, focal left hemisphere theta  delta slowing consistent with left hemispheric cerebral dysfunction no epileptiform activity.    Echocardiogram  Echo result w/o MOPS: Interpretation Summary 1.Left ventricular size, wall motion and function are normal. The ejectionfraction is 55-60%.2.TAPSE is normal, which is consistent with normal right ventricular systolicfunction.3.No hemodynamically significant valvular abnormalities on 2D or color flowimaging.4.No obvious embolic source seen.5.There is no comparison study available.    Carotid Ultrasound    1.  Mild plaque formation, velocities consistent with less than 50% stenosis in the right internal carotid artery.   2.  Severe plaque formation, velocities consistent with greater than 70% stenosis in the left internal carotid artery.   3.  Flow within the vertebral arteries is antegrade.    Pertinent Labs   Lab Results: Personally Reviewed  Recent Results (from the past 24 hour(s))   Glucose by meter    Collection Time: 11/05/21  8:42 AM   Result Value Ref Range    GLUCOSE BY METER POCT 107 (H) 70 - 99 mg/dL   Glucose by meter    Collection Time: 11/05/21  2:41 PM   Result Value Ref Range    GLUCOSE BY METER POCT 147 (H) 70 - 99 mg/dL   Glucose by meter    Collection Time: 11/05/21  6:29 PM   Result Value Ref Range    GLUCOSE BY METER POCT 168 (H) 70 - 99 mg/dL   Glucose by meter    Collection Time: 11/05/21 10:00 PM   Result Value Ref Range    GLUCOSE BY METER POCT 213 (H) 70 - 99 mg/dL   Platelet count    Collection Time: 11/06/21  6:12 AM   Result Value Ref Range    Platelet Count 177 150 - 450 10e3/uL         HOSPITAL MEDICATIONS       acetaminophen  975 mg Oral Q8H     aspirin  81 mg Oral Daily    Or     aspirin  81 mg Oral or NG Tube Daily     atorvastatin  80 mg Oral QPM     heparin ANTICOAGULANT  5,000 Units Subcutaneous Q8H     insulin aspart  1-6 Units Subcutaneous 4x Daily AC & HS     insulin glargine  5 Units Subcutaneous At Bedtime     levETIRAcetam  750 mg Oral BID     losartan  50 mg Oral  Daily     metoprolol tartrate  25 mg Oral Q12H    Or     metoprolol  25 mg Per NG tube Q12H     pantoprazole (PROTONIX) IV  40 mg Intravenous Daily with breakfast     polyethylene glycol  17 g Oral Daily     senna-docusate  1 tablet Oral BID     sodium chloride (PF)  3 mL Intracatheter Q8H     sodium chloride (PF)  3 mL Intracatheter Q8H        Total time spent for face to face visit, reviewing labs/imaging studies, counseling and coordination of care was: 30 Minutes More than 50% of this time was spent on counseling and coordination of care.      This note was dictated using voice recognition software.  Any grammatical or context distortions are unintentional and inherent to the software.

## 2021-11-08 NOTE — PROGRESS NOTES
"Clinic Care Coordination Contact  [facility]: Post-Discharge Note  SITUATION                                                      Admission:    Admission Date: 11/03/21   Reason for Admission: Acute CVA  Discharge:   Discharge Date: 11/06/21  Discharge Diagnosis: Acute CVA    BACKGROUND                                                         Jorge Young is a 66 year old male who has a hx of RCA CVA, hx of post cva sz, dm, lipids, htn presents with acute onset aphasia today, confusion x 2 days headache x 2 days.     Fist, he lives alone in an apartment attached to his shop(recent divorce)  This am he called son at 0630 and seemed fine. His son picked him up at 0730 and noticed right away he was off. He seemed confused and his speech was off. He knew something was wrong.  Son notes  told him this am that Jorge was acting unusual yesterday. He made a smoothie and then dumped all of the smoothie in the sink and also dumped the  in the sink as well.   Son notes  also told him Jorge seemed to have some stomach issues yesterday with emesis     Jorge notes he did not feel well yesterday. He notes headache x 2 days and does agree this am hard to get his words out.  Jorge denied any focal weakness or numbness  Jorge tells me he can't remember much about yesterday  Both Jorge and his son feel his speech is improving since being in ER     Son notes CVA back 1 and 1/2 years ago. Seemed to make complete recovery, but 3 months after cva had sz, syncope and has not driven since. He also notes back 7/21 had episode with confusion but when they checked EEG it was negative.    ASSESSMENT      Enrollment  Primary Care Care Coordination Status: Declined    Discharge Assessment  How are you doing now that you are home?: \"Doing okay\"  How are your symptoms? (Red Flag symptoms escalate to triage hotline per guidelines): Improved  Do you feel your condition is stable enough to be safe at home until " your provider visit?: Yes  Does the patient have their discharge instructions? : Yes  Does the patient have questions regarding their discharge instructions? : No  Were you started on any new medications or were there changes to any of your previous medications? : Yes  Does the patient have all of their medications?: No (see comment) (Going to  later today)  Do you have questions regarding any of your medications? : No  Do you have all of your needed medical supplies or equipment (DME)?  (i.e. oxygen tank, CPAP, cane, etc.): Yes  Discharge follow-up appointment scheduled within 14 calendar days? : Yes  Discharge Follow Up Appointment Date: 11/17/21  Discharge Follow Up Appointment Scheduled with?: Primary Care Provider    Post-op ([facility] CTA Only)  If the patient had a surgery or procedure, do they have any questions for a nurse?: No      PLAN                                                      Outpatient Plan:    Follow-up and recommended labs and tests      Follow up with Dr. Frazier as scheduled with left carotid duplex prior.    The clinic will call to schedule this.        Follow-up and recommended labs and tests      Follow up with primary care provider, Ashley Narvaez, within 7 days   for hospital follow- up.  No follow up labs or test are needed.    Follow up with neurology and vascular surgery as scheduled      Future Appointments   Date Time Provider Department Center   11/17/2021  7:50 AM [facility]C [facility]   12/9/2021 12:45 PM [facility]   12/9/2021  1:20 PM Nikki Ellis MD MDVS[facility]         For any urgent concerns, please contact our 24 hour nurse triage line: [phone] ([address] [place] [address])         YUNI Garrett  [phone]  [facility]

## 2021-11-09 NOTE — TELEPHONE ENCOUNTER
Triage call:     Surgery on carotid artery 11/6 at [facility] with Dr. Denney.  Sunday night, he noticed a hard round spot sticking out 0.5in above his dressing. It is red but not painful.   Denies pain, difficulty breathing, CP, fever.  He does state it is hard to talk because his tongue is swollen but reports that has been the same since his surgery.     Patient was warm transferred to the [facility] to discuss these symptoms with a member from his Surgery team.     Brittany Busch RN   11/09/21 11:26 AM  [facility] Nurse Advisor      Reason for Disposition    Caller has URGENT question and triager unable to answer question    Additional Information    Negative: Sounds like a life-threatening emergency to the triager    Negative: SEVERE headache and after spinal (epidural) anesthesia    Negative: Vomiting and persists > 4 hours    Negative: Vomiting and abdomen looks much more swollen than usual    Negative: Drinking very little and dehydration suspected (e.g., no urine > 12 hours, very dry mouth, very lightheaded)    Negative: Patient sounds very sick or weak to the triager    Negative: Sounds like a serious complication to the triager    Negative: Fever > 100.4 F (38.0 C)    Protocols used: POST-OP SYMPTOMS AND [id number]

## 2021-11-09 NOTE — TELEPHONE ENCOUNTER
Caller: Patient    Provider: MD Xavi Frazier    Detailed reason for call: he is noting increased swelling at the incision site and it feels hard. No pain, redness or drainage. Please call if cause for concern    Best phone number to contact: [phone]    Best time to contact: any    Ok to leave a detailed message: Yes

## 2021-11-09 NOTE — TELEPHONE ENCOUNTER
Called Mr. Young at [phone] to follow up on concerns about his left carotid endarterectomy wound. He reports some swelling in the area that has been there for the past few days. There has not been a sudden change. He reports some warmth over the wound but no redness, pain, or drainage. He is not taking any pain medicine. He reports that his voice sounds normal. He denies globus sensation, difficulty breathing, shortness of breath.     He is taking aspirin and plavix.     We discussed that this may be expected post op swelling that is part of the healing process. The bigger concern is a post op hematoma.     He is going to email a picture [email or web address]. If unable to determine if this is a hematoma from the picture, he is willing to come to the clinic today to be evaluated.    Marysol Lara MD  11/09/21  1:59 PM     Addendum  After reviewing the pictures he sent and further discussion, it sounds like he was concerned that the wound felt hard. He is not having other issues. The dressing was removed and he says the wound looks good. We reviewed wound care instructions including the following:  - okay to shower and clean the area gently with soap and water  - otherwise keep it clean and dry  - no dressing needed.  Okay to follow up as scheduled. No concerns for wound infection, hematoma, dehiscence, or other complication at this time. Patient recovering on expected course.    Marysol Lara MD  11/09/21  4:46 PM

## 2021-11-17 PROBLEM — R56.9 SEIZURE (H): Status: RESOLVED | Noted: 2021-01-01 | Resolved: 2021-01-01

## 2021-11-17 PROBLEM — Z11.52 ENCOUNTER FOR SCREENING LABORATORY TESTING FOR SEVERE ACUTE RESPIRATORY SYNDROME CORONAVIRUS 2 (SARS-COV-2): Status: RESOLVED | Noted: 2021-01-01 | Resolved: 2021-01-01

## 2021-11-17 PROBLEM — Z20.822 LAB TEST NEGATIVE FOR COVID-19 VIRUS: Status: RESOLVED | Noted: 2021-01-01 | Resolved: 2021-01-01

## 2021-11-17 NOTE — PROGRESS NOTES
Subjective:    Jorge Young is a 66 year old male who presents with chief complaint of hospital discharge follow-up.  He was hospitalized on 11/3/2021, discharged on 11/6/2021.  He presented to our clinic with confusion.  He was assessed by a provider and immediately sent to the emergency room out of concern for stroke.  He been having symptoms for a few days prior.  Discharge diagnoses were acute CVA, acute ulcerative left ICA status post left carotid endarterectomy, seizure disorder, type 2 diabetes, hypertension, hyperlipidemia  He was to continue same medications, start Plavix and stay on that for at least 30 days.  Medications were reconciled today.  He has a follow-up appointment with vascular surgeon on December 9.  Unclear if he has neurology follow-up scheduled yet.  He will check on that.  Only pending lab at the time of discharge was the pathology from the carotid endarterectomy.  I reviewed that result and it was benign.  Overall he feels much better since he has been home from the ER.  No problems with hemiparesis or neuromuscular issues.  Some constipation, but generally well managed.  He is self-employed, works with computers.  He is pretty much back at work.  He lives alone.  He feels like he is managing all of his ADLs without problems.  He does not have a car, which sometimes can limit his transportation a little bit.  PHQ-9 = 2  Has been following with diabetic education but has not had an appointment with them for a while.  Echocardiogram completed at the hospital showed ejection fraction 55 to 60% was grossly normal study.  I reviewed that report today.    I reviewed carotid ultrasound report, right internal carotid artery had mild plaque, some 50% stenosis.  Left internal carotid had severe plaque formation, greater than 70%.    Of note, he is only taking glipizide and Actos for his diabetes.  Victoza was too expensive, he cannot tolerate Metformin.  He was started on insulin at the  hospital, but did not want to continue with that after discharge.  A1c in the hospital was 9.4%, improved from 11.7% 3 months prior.  Patient states he does not regularly check his blood sugars.  He also says he has not picked up his blood pressure medicines from the pharmacy yet.    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     Syncope, unspecified syncope type     History of seizure     Aphasia     History of embolic stroke     Acute encephalopathy     Encounter for screening laboratory testing for severe acute respiratory syndrome coronavirus 2 (SARS-CoV-2)     Left facial numbness     Left hand weakness     Atherosclerosis of left carotid artery     Acute CVA (cerebrovascular accident) (H)     Symptomatic carotid artery stenosis with infarction (H)       Current Outpatient Medications:      ACCU-CHEK GUIDE test strip, Use to test blood sugar 1 time daily., Disp: 50 strip, Rfl: 11     acetaminophen (TYLENOL) 325 MG tablet, Take 2 tablets (650 mg) by mouth every 4 hours as needed for other (For optimal non-opioid multimodal pain management to improve pain control.), Disp: , Rfl:      aspirin (ASA) 81 MG EC tablet, Take 1 tablet (81 mg) by mouth daily, Disp: 100 tablet, Rfl: 3     atorvastatin (LIPITOR) 80 MG tablet, Take 1 tablet (80 mg) by mouth daily, Disp: 90 tablet, Rfl: 3     blood glucose monitoring (SOFTCLIX) lancets, Use to test blood sugar 1 time daily., Disp: 100 each, Rfl: 4     Blood Glucose Monitoring Suppl (ACCU-CHEK GUIDE ME) w/Device KIT, 1  "each daily, Disp: 1 kit, Rfl: 0     clopidogrel (PLAVIX) 75 MG tablet, Take 1 tablet (75 mg) by mouth daily, Disp: 30 tablet, Rfl: 0     Continuous Blood Gluc  (FREESTYLE MICHELLE READER) MICHAEL, 1 each every 8 hours Use reader to scan sensor a minimum of every 8 hours., Disp: 1 each, Rfl: 0     Continuous Blood Gluc Sensor (FREESTYLE MICHELLE 14 DAY SENSOR) MISC, 1 each every 14 days Change every 14 days., Disp: 2 each, Rfl: 5     glipiZIDE (GLUCOTROL XL) 10 MG 24 hr tablet, Take 1 tablet (10 mg) by mouth daily, Disp: 90 tablet, Rfl: 3     hydrochlorothiazide (HYDRODIURIL) 50 MG tablet, Take 1 tablet (50 mg) by mouth daily, Disp: 90 tablet, Rfl: 3     levETIRAcetam (KEPPRA) 750 MG tablet, Take 750 mg by mouth 2 times daily, Disp: , Rfl:      losartan (COZAAR) 50 MG tablet, Take 1 tablet (50 mg) by mouth daily, Disp: 90 tablet, Rfl: 3     multivitamin w/minerals (THERA-VIT-M) tablet, Take 1 tablet by mouth daily, Disp: , Rfl:      oxyCODONE (ROXICODONE) 5 MG tablet, Take 1 tablet (5 mg) by mouth every 4 hours as needed for moderate to severe pain, Disp: 5 tablet, Rfl: 0     pioglitazone (ACTOS) 30 MG tablet, Take one tablet daily by mouth., Disp: 30 tablet, Rfl: 1     polyethylene glycol-propylene glycol (SYSTANE ULTRA) 0.4-0.3 % SOLN ophthalmic solution, Place 1 drop into both eyes every hour as needed for dry eyes, Disp: , Rfl:       Objective:   Allergies:  Novocain [procaine]    Vitals:  Vitals:    11/17/21 0808 11/17/21 0833   BP: (!) 156/114 (!) 174/111   BP Location: Right arm Right arm   Patient Position: Sitting Sitting   Cuff Size: Adult Regular Adult Regular   Pulse: 100    Resp: 18    Temp: 97.8  F (36.6  C)    TempSrc: Temporal    SpO2: 98%    Weight: 88.5 kg (195 lb)    Height: 1.702 m (5' 7\")        Body mass index is 30.54 kg/m .    Vital signs reviewed.  General: Patient is alert and oriented x 3, in no apparent distress  Cardiac: regular rate and rhythm, no murmurs  Pulmonary: lungs clear to " auscultation bilaterally, no crackles, rales, rhonchi, or wheezing noted      Assessment and Plan:   1. Hospital discharge follow-up  2. Acute CVA (cerebrovascular accident) (H)  Overall doing well since discharge.  He has a follow-up appointment with vascular scheduled.  We will try to figure out when his neurology follow-up is.  For now continue same medications.  He is supposed to be on Plavix for least 30 days however continue this until he sees specialist for follow-up.  No acute concerns today.    3. Hypertension  Blood pressure elevated, s/p stroke.  Also he has not picked up his blood pressure medicines yet.  Otherwise feeling fine and exam is generally reassuring today.    4. Symptomatic carotid artery stenosis with infarction (H)  Status post left carotid endarterectomy.  Healing well.  Has surgical follow-up soon.    5. Uncontrolled type 2 diabetes mellitus with hyperglycemia (H)  6. Type 2 diabetes mellitus with both eyes affected by moderate nonproliferative retinopathy and macular edema, without long-term current use of insulin (H)  A1c: A1c = 9.4 % during recent hospitalization, improvement from 11.7 % months ago.  Eye Exam: Up-to-date, following with eye specialist  Foot Exam: Up-to-date  Smoking: No  On a statin: Yes  Blood Pressure: Not controlled today, status post stroke, and he has not picked up his blood pressure medicines.  He will do this today.  Microalbumin: Up-to-date  On ACE inhibitor: On ARB  Continue glipizide and Actos once daily for now.  We need to try to get better control of his blood sugars.  He is not checking his sugars at all.  He says he forgets.  Could consider freestyle vincent meter.  I will have him follow-up with diabetic education within the next few weeks to discuss ways to check blood sugars and medication adjustments.  As stated in HPI, he cannot tolerate Metformin.  Victoza was too expensive when we tried to prescribe that previously.  He wants to try to avoid insulin  if possible.    38 minutes spent on the date of the encounter doing chart review, history and exam, and documentation.      This dictation uses voice recognition software, which may contain typographical errors.

## 2021-12-17 NOTE — PROGRESS NOTES
"Patient now a little over a month after left carotid endarterectomy for symptomatic disease with episodes of aphasia.  Operation was uneventful and performed by Dr. Frazier on November 4.  Patient had a prior history of seizure disorder for which she was on Keppra and prior strokes.  Was seen by neurology and was supposed to follow-up with them after surgery.  Since going home has been doing well.  Really no pain.  No cranial nerve issues.  Compliant with his aspirin and statin therapy.  Is diabetic with poor hemoglobin A1c levels.  Has never smoked.    On review he does not think he has ever been screened for aortic aneurysm.  He does not recall that he is any family members with aortic aneurysm.  That said,, as a diabetic who has never smoked his risk is probably very low.  Overall does not meet guidelines for aortic aneurysm screening under Medicare.    BP (!) 120/90   Pulse 78   Ht 5' 7\" (1.702 m)   Wt 190 lb (86.2 kg)   SpO2 99%   BMI 29.76 kg/m      On physical exam    Left neck incision healing nicely.   No cranial nerve issues   he has 2+ popliteal pulses.  His aorta is weakly palpable but does not feel aneurysmal to me.    Carotid duplex shows widely patent left carotid artery with normal velocities.    Impression and plan: Good early outcome from carotid endarterectomy.  Really needs tighter blood sugar management.  We will also set him up for follow-up with neurology which had apparently been intended.  We will see him back in Dr. Frazier's clinic in 3 months time with repeat carotid duplex.  "

## 2021-12-17 NOTE — PATIENT INSTRUCTIONS
You do not need to be on Plavix anymore.     Carotid Artery Problems: Stroke    The carotid arteries are large blood vessels that carry blood to the brain. When these arteries are healthy, the brain gets all the oxygen and nutrients it needs to function well. But if the carotid arteries are damaged, this can greatly increase your risk of a stroke. A stroke is a sudden loss of brain function caused by a lack of blood flow and oxygen.    Blood clot blocking carotid artery and emboli breaking off clot. Inset shows damage to brain.      Small pieces of a blood clot called emboli break off and can enter the bloodstream and travel to the brain. Brain tissue is damaged when emboli block arteries in the brain.    How artery damage can lead to a stroke  A healthy carotid artery is smooth on the inside, like a tube. But health problems such as high blood pressure, diabetes, and smoking can damage the inside of the artery wall and make it rough. This lets fatty deposits called plaque build up on the artery wall. Blood clots called emboli may also form on the plaque. If pieces of plaque or emboli break off, they can flow in the blood and get stuck in a small blood vessel in the brain. This blocks the blood flow to a part of the brain, and causes a stroke.      Symptoms of a stroke  Below are common symptoms of a stroke.  Call 911 right away if you have any of these symptoms. Fast medical treatment for a stroke is vital. The longer you wait to get help, the more damage a stroke can do.    Sudden numbness or weakness of the face, arm, or leg, especially on one side of the body  Sudden confusion or trouble speaking or understanding other people  Sudden trouble seeing in 1 or both eyes  Sudden trouble walking, dizziness, or loss of balance or coordination  Sudden, severe headache with no known cause  Sudden new seizures    Transient ischemic attack (TIA)  A transient ischemic attack (TIA) is a mini-stroke. It s a warning sign that  a larger stroke may happen in the near future. A TIA is when an artery to the brain is blocked for a brief time. This will cause symptoms just like those that happen with a stroke. But with a TIA, they last a short period of time, from a few seconds to a few hours. Never ignore any TIA or stroke symptoms.  Call 911 right away .      F.A.S.T.  F.A.S.T. is an easy way to remember the signs of a stroke or TIA. When you see these signs, call 911 fast.    F.A.S.T. stands for:    F is for face drooping. One side of the face is drooping or numb. When the person smiles, the smile is uneven.  A is for arm weakness. One arm is weak or numb. When the person lifts both arms at the same time, one arm may drift downward.  S is for speech difficulty. You may notice slurred speech or trouble speaking. The person can't repeat a simple sentence correctly when asked.  T is for time to call 911. If someone shows any of these symptoms, even if the symptoms go away, call 911 right away. Make note of the time the symptoms first appeared.          3140-6622 [employer]. [address] [place] [address]. All rights reserved. This information is not intended as a substitute for professional medical care. Always follow your healthcare professional's instructions.  Carotid Duplex    Steps for the test are:    You will be asked to lie on a stretcher. It will be okay for you to wear your street clothes. In some situations, you may be asked to change into a gown.      Ultrasound gel, usually warmed for your comfort, will be placed on either side of your neck.      Through the gel, the technician will apply to your neck a small hand-held device that emits sound waves.     When the test is completed, the technician will remove excess gel from your neck. The gel is water-soluble and will not stain your skin or clothes.    How to Prepare:    Eat and take medications as usual.     Wear minimal or no jewelry to ease application  and removal of gel.    Risks  There are typically no side effects or complications associated with a carotid duplex ultrasound examination.    What Can I Expect After the Test?  The technician will send the ultrasound images to your vascular surgeon for evaluation. Typically, a report is available in 2-3 days. If anything critical is found, it is standard practice to notify the vascular surgeon immediately.  Reference: https://vascular.org/patient-resources/vascular-tests

## 2021-12-17 NOTE — PROGRESS NOTES
"[facility]        Patient is here for a post-op to discuss 11/4/21 Left CEA with Dr. Frazier.     Pt is currently taking Statin and Plavix.    BP (!) 120/90   Pulse 78   Ht 5' 7\" (1.702 m)   Wt 190 lb (86.2 kg)   SpO2 99%   BMI 29.76 kg/m      The provider has been notified that the patient has no concerns.     Questions patient would like addressed today are: N/A.    Refills are needed: YES    Has homecare services and agency name:  No       Cecilia Finley MA      "

## 2021-12-27 NOTE — LETTER
December 31, 2021      Jorge Young  [address]  [place] [address]              Dear IVONE Patel [facility] has tried to call you a couple of times.  Ashley Brice recieved a message that you were in the hospital again recently.  When did you get discharged?  Have you had a follow-up visit with the specialist/neurologist since you were was discharged? Please call clinic back at [phone] or schedule an appointment to follow up         Sincerely,    IVONE [facility] RN

## 2021-12-28 NOTE — TELEPHONE ENCOUNTER
"RN attempt to call pt regarding Ashley's message below. No answer--\"Your call cannot be completed at this time, please try again later.\"    RN will attempt to reach pt another time.    FRANCE HamiltonN, RN   [facility]    "

## 2021-12-28 NOTE — TELEPHONE ENCOUNTER
I got a message that he was in the hospital again recently.  When did he get discharged?  Has he had a follow-up visit with the specialist/neurologist since he was discharged?

## 2021-12-29 NOTE — TELEPHONE ENCOUNTER
"RN made 2nd attempt to contact patient, but no answer   RN attempt to call pt regarding Ashley's message below. No answer--\"Your call cannot be completed at this time, please try again later.\"    RN will attempt to reach pt another time.    Yoon REYNAGA, [facility]  "

## 2021-12-31 NOTE — TELEPHONE ENCOUNTER
"RN made 3rd attempt to contact patient, but no answer   RN attempt to call pt regarding Ashley's message below. No answer--\"Your call cannot be completed at this time, please try again later.\"    Letter mailed to patient's home address to call clinic back.      Yoon Willis RN  [facility]    "

## 2022-01-01 ENCOUNTER — HOSPITAL ENCOUNTER (OUTPATIENT)
Facility: HOSPITAL | Age: 67
Setting detail: OBSERVATION
Discharge: HOME | End: 2022-03-17
Attending: EMERGENCY MEDICINE | Admitting: EMERGENCY MEDICINE
Payer: COMMERCIAL

## 2022-01-01 ENCOUNTER — MEDICAL CORRESPONDENCE (OUTPATIENT)
Dept: HEALTH INFORMATION MANAGEMENT | Facility: CLINIC | Age: 67
End: 2022-01-01
Payer: COMMERCIAL

## 2022-01-01 ENCOUNTER — APPOINTMENT (OUTPATIENT)
Dept: OCCUPATIONAL THERAPY | Facility: HOSPITAL | Age: 67
End: 2022-01-01
Payer: COMMERCIAL

## 2022-01-01 ENCOUNTER — APPOINTMENT (OUTPATIENT)
Dept: PHYSICAL THERAPY | Facility: HOSPITAL | Age: 67
End: 2022-01-01
Payer: COMMERCIAL

## 2022-01-01 ENCOUNTER — TELEPHONE (OUTPATIENT)
Dept: FAMILY MEDICINE | Facility: CLINIC | Age: 67
End: 2022-01-01
Payer: COMMERCIAL

## 2022-01-01 ENCOUNTER — APPOINTMENT (OUTPATIENT)
Dept: SPEECH THERAPY | Facility: HOSPITAL | Age: 67
End: 2022-01-01
Attending: INTERNAL MEDICINE
Payer: COMMERCIAL

## 2022-01-01 ENCOUNTER — TELEPHONE (OUTPATIENT)
Dept: NURSING | Facility: CLINIC | Age: 67
End: 2022-01-01

## 2022-01-01 ENCOUNTER — LAB REQUISITION (OUTPATIENT)
Dept: LAB | Facility: CLINIC | Age: 67
End: 2022-01-01
Payer: COMMERCIAL

## 2022-01-01 ENCOUNTER — APPOINTMENT (OUTPATIENT)
Dept: OCCUPATIONAL THERAPY | Facility: HOSPITAL | Age: 67
End: 2022-01-01
Attending: HOSPITALIST
Payer: COMMERCIAL

## 2022-01-01 ENCOUNTER — APPOINTMENT (OUTPATIENT)
Dept: RADIOLOGY | Facility: HOSPITAL | Age: 67
End: 2022-01-01
Attending: HOSPITALIST
Payer: COMMERCIAL

## 2022-01-01 ENCOUNTER — PATIENT OUTREACH (OUTPATIENT)
Dept: CARE COORDINATION | Facility: CLINIC | Age: 67
End: 2022-01-01
Payer: COMMERCIAL

## 2022-01-01 ENCOUNTER — APPOINTMENT (OUTPATIENT)
Dept: CT IMAGING | Facility: HOSPITAL | Age: 67
End: 2022-01-01
Attending: EMERGENCY MEDICINE
Payer: COMMERCIAL

## 2022-01-01 ENCOUNTER — OFFICE VISIT (OUTPATIENT)
Dept: FAMILY MEDICINE | Facility: CLINIC | Age: 67
End: 2022-01-01
Payer: COMMERCIAL

## 2022-01-01 ENCOUNTER — APPOINTMENT (OUTPATIENT)
Dept: PHYSICAL THERAPY | Facility: HOSPITAL | Age: 67
End: 2022-01-01
Attending: HOSPITALIST
Payer: COMMERCIAL

## 2022-01-01 ENCOUNTER — PATIENT OUTREACH (OUTPATIENT)
Dept: NURSING | Facility: CLINIC | Age: 67
End: 2022-01-01
Payer: COMMERCIAL

## 2022-01-01 ENCOUNTER — APPOINTMENT (OUTPATIENT)
Dept: RADIOLOGY | Facility: HOSPITAL | Age: 67
End: 2022-01-01
Attending: INTERNAL MEDICINE
Payer: COMMERCIAL

## 2022-01-01 VITALS
WEIGHT: 186 LBS | SYSTOLIC BLOOD PRESSURE: 110 MMHG | HEART RATE: 78 BPM | DIASTOLIC BLOOD PRESSURE: 70 MMHG | RESPIRATION RATE: 12 BRPM | BODY MASS INDEX: 29.13 KG/M2

## 2022-01-01 VITALS
WEIGHT: 190.25 LBS | OXYGEN SATURATION: 100 % | BODY MASS INDEX: 29.8 KG/M2 | SYSTOLIC BLOOD PRESSURE: 123 MMHG | HEART RATE: 92 BPM | TEMPERATURE: 97.3 F | DIASTOLIC BLOOD PRESSURE: 80 MMHG

## 2022-01-01 VITALS
OXYGEN SATURATION: 99 % | DIASTOLIC BLOOD PRESSURE: 63 MMHG | SYSTOLIC BLOOD PRESSURE: 106 MMHG | RESPIRATION RATE: 20 BRPM | HEART RATE: 63 BPM | WEIGHT: 186 LBS | TEMPERATURE: 97.9 F | BODY MASS INDEX: 29.13 KG/M2

## 2022-01-01 DIAGNOSIS — I69.398 SEIZURE DISORDER AS SEQUELA OF CEREBROVASCULAR ACCIDENT (H): ICD-10-CM

## 2022-01-01 DIAGNOSIS — G93.40 ENCEPHALOPATHY, UNSPECIFIED: ICD-10-CM

## 2022-01-01 DIAGNOSIS — E11.65 UNCONTROLLED TYPE 2 DIABETES MELLITUS WITH HYPERGLYCEMIA (H): ICD-10-CM

## 2022-01-01 DIAGNOSIS — K59.00 CONSTIPATION, UNSPECIFIED CONSTIPATION TYPE: ICD-10-CM

## 2022-01-01 DIAGNOSIS — I10 ESSENTIAL (PRIMARY) HYPERTENSION: ICD-10-CM

## 2022-01-01 DIAGNOSIS — E11.9 TYPE 2 DIABETES MELLITUS WITHOUT COMPLICATIONS (H): ICD-10-CM

## 2022-01-01 DIAGNOSIS — E11.3313 TYPE 2 DIABETES MELLITUS WITH BOTH EYES AFFECTED BY MODERATE NONPROLIFERATIVE RETINOPATHY AND MACULAR EDEMA, WITHOUT LONG-TERM CURRENT USE OF INSULIN (H): ICD-10-CM

## 2022-01-01 DIAGNOSIS — G40.909 SEIZURE DISORDER AS SEQUELA OF CEREBROVASCULAR ACCIDENT (H): ICD-10-CM

## 2022-01-01 DIAGNOSIS — I63.9 ACUTE CVA (CEREBROVASCULAR ACCIDENT) (H): ICD-10-CM

## 2022-01-01 DIAGNOSIS — Z76.0 ENCOUNTER FOR MEDICATION REFILL: Primary | ICD-10-CM

## 2022-01-01 DIAGNOSIS — I63.9 CEREBRAL INFARCTION, UNSPECIFIED MECHANISM (H): ICD-10-CM

## 2022-01-01 DIAGNOSIS — G89.18 ACUTE POST-OPERATIVE PAIN: ICD-10-CM

## 2022-01-01 DIAGNOSIS — S30.0XXA TRAUMATIC HEMATOMA OF BUTTOCK, INITIAL ENCOUNTER: ICD-10-CM

## 2022-01-01 DIAGNOSIS — Z09 HOSPITAL DISCHARGE FOLLOW-UP: Primary | ICD-10-CM

## 2022-01-01 DIAGNOSIS — M62.81 GENERALIZED MUSCLE WEAKNESS: ICD-10-CM

## 2022-01-01 DIAGNOSIS — N17.8 OTHER ACUTE KIDNEY FAILURE (H): ICD-10-CM

## 2022-01-01 DIAGNOSIS — R62.7 ADULT FAILURE TO THRIVE: Primary | ICD-10-CM

## 2022-01-01 DIAGNOSIS — G40.219 EPILEPSY CHARACTERIZED BY INTRACTABLE COMPLEX PARTIAL SEIZURES (H): ICD-10-CM

## 2022-01-01 DIAGNOSIS — I26.01 SEPTIC PULMONARY EMBOLISM WITH ACUTE COR PULMONALE (H): ICD-10-CM

## 2022-01-01 DIAGNOSIS — D62 ACUTE POSTHEMORRHAGIC ANEMIA: ICD-10-CM

## 2022-01-01 DIAGNOSIS — I10 PRIMARY HYPERTENSION: ICD-10-CM

## 2022-01-01 DIAGNOSIS — G40.219 EPILEPSY CHARACTERIZED BY INTRACTABLE COMPLEX PARTIAL SEIZURES (H): Primary | ICD-10-CM

## 2022-01-01 DIAGNOSIS — M25.521 RIGHT ELBOW PAIN: ICD-10-CM

## 2022-01-01 DIAGNOSIS — R26.81 UNSTEADY GAIT: ICD-10-CM

## 2022-01-01 DIAGNOSIS — R56.9 UNSPECIFIED CONVULSIONS (H): ICD-10-CM

## 2022-01-01 DIAGNOSIS — I63.9 ACUTE EMBOLIC STROKE (H): ICD-10-CM

## 2022-01-01 DIAGNOSIS — Z87.898 HX OF VOMITING: ICD-10-CM

## 2022-01-01 DIAGNOSIS — R53.1 WEAKNESS: ICD-10-CM

## 2022-01-01 DIAGNOSIS — I63.9 CEREBRAL INFARCTION, UNSPECIFIED (H): ICD-10-CM

## 2022-01-01 DIAGNOSIS — I10 ESSENTIAL HYPERTENSION: ICD-10-CM

## 2022-01-01 LAB
ALBUMIN SERPL-MCNC: 3.5 G/DL (ref 3.5–5)
ALBUMIN SERPL-MCNC: 4 G/DL (ref 3.5–5)
ALBUMIN UR-MCNC: 20 MG/DL
ALP SERPL-CCNC: 108 U/L (ref 45–120)
ALP SERPL-CCNC: 96 U/L (ref 45–120)
ALT SERPL W P-5'-P-CCNC: 19 U/L (ref 0–45)
ALT SERPL W P-5'-P-CCNC: 24 U/L (ref 0–45)
ANION GAP SERPL CALCULATED.3IONS-SCNC: 11 MMOL/L (ref 5–18)
ANION GAP SERPL CALCULATED.3IONS-SCNC: 12 MMOL/L (ref 5–18)
ANION GAP SERPL CALCULATED.3IONS-SCNC: 16 MMOL/L (ref 5–18)
ANION GAP SERPL CALCULATED.3IONS-SCNC: 5 MMOL/L (ref 5–18)
ANION GAP SERPL CALCULATED.3IONS-SCNC: 6 MMOL/L (ref 5–18)
ANION GAP SERPL CALCULATED.3IONS-SCNC: 7 MMOL/L (ref 5–18)
ANION GAP SERPL CALCULATED.3IONS-SCNC: 8 MMOL/L (ref 5–18)
APPEARANCE UR: CLEAR
AST SERPL W P-5'-P-CCNC: 16 U/L (ref 0–40)
AST SERPL W P-5'-P-CCNC: 21 U/L (ref 0–40)
ATRIAL RATE - MUSE: 89 BPM
BACTERIA #/AREA URNS HPF: ABNORMAL /HPF
BACTERIA BLD CULT: NO GROWTH
BACTERIA BLD CULT: NO GROWTH
BACTERIA UR CULT: NORMAL
BASOPHILS # BLD AUTO: 0 10E3/UL (ref 0–0.2)
BASOPHILS # BLD AUTO: 0.1 10E3/UL (ref 0–0.2)
BASOPHILS # BLD AUTO: 0.1 10E3/UL (ref 0–0.2)
BASOPHILS NFR BLD AUTO: 1 %
BILIRUB SERPL-MCNC: 0.5 MG/DL (ref 0–1)
BILIRUB SERPL-MCNC: 0.5 MG/DL (ref 0–1)
BILIRUB UR QL STRIP: NEGATIVE
BUN SERPL-MCNC: 12 MG/DL (ref 8–22)
BUN SERPL-MCNC: 16 MG/DL (ref 8–22)
BUN SERPL-MCNC: 17 MG/DL (ref 8–22)
BUN SERPL-MCNC: 21 MG/DL (ref 8–22)
BUN SERPL-MCNC: 23 MG/DL (ref 8–22)
BUN SERPL-MCNC: 25 MG/DL (ref 8–22)
BUN SERPL-MCNC: 30 MG/DL (ref 8–22)
C REACTIVE PROTEIN LHE: 0.6 MG/DL (ref 0–0.8)
CALCIUM SERPL-MCNC: 8.1 MG/DL (ref 8.5–10.5)
CALCIUM SERPL-MCNC: 8.3 MG/DL (ref 8.5–10.5)
CALCIUM SERPL-MCNC: 8.8 MG/DL (ref 8.5–10.5)
CALCIUM SERPL-MCNC: 9 MG/DL (ref 8.5–10.5)
CALCIUM SERPL-MCNC: 9.3 MG/DL (ref 8.5–10.5)
CALCIUM SERPL-MCNC: 9.6 MG/DL (ref 8.5–10.5)
CALCIUM SERPL-MCNC: 9.7 MG/DL (ref 8.5–10.5)
CHLORIDE BLD-SCNC: 100 MMOL/L (ref 98–107)
CHLORIDE BLD-SCNC: 100 MMOL/L (ref 98–107)
CHLORIDE BLD-SCNC: 103 MMOL/L (ref 98–107)
CHLORIDE BLD-SCNC: 104 MMOL/L (ref 98–107)
CHLORIDE BLD-SCNC: 93 MMOL/L (ref 98–107)
CHLORIDE BLD-SCNC: 98 MMOL/L (ref 98–107)
CHLORIDE BLD-SCNC: 99 MMOL/L (ref 98–107)
CK SERPL-CCNC: 259 U/L (ref 30–190)
CO2 SERPL-SCNC: 21 MMOL/L (ref 22–31)
CO2 SERPL-SCNC: 26 MMOL/L (ref 22–31)
CO2 SERPL-SCNC: 27 MMOL/L (ref 22–31)
CO2 SERPL-SCNC: 27 MMOL/L (ref 22–31)
CO2 SERPL-SCNC: 28 MMOL/L (ref 22–31)
CO2 SERPL-SCNC: 29 MMOL/L (ref 22–31)
CO2 SERPL-SCNC: 31 MMOL/L (ref 22–31)
COLOR UR AUTO: ABNORMAL
CREAT SERPL-MCNC: 0.75 MG/DL (ref 0.7–1.3)
CREAT SERPL-MCNC: 0.79 MG/DL (ref 0.7–1.3)
CREAT SERPL-MCNC: 0.81 MG/DL (ref 0.7–1.3)
CREAT SERPL-MCNC: 0.91 MG/DL (ref 0.7–1.3)
CREAT SERPL-MCNC: 0.99 MG/DL (ref 0.7–1.3)
CREAT SERPL-MCNC: 1.05 MG/DL (ref 0.7–1.3)
CREAT SERPL-MCNC: 1.52 MG/DL (ref 0.7–1.3)
DIASTOLIC BLOOD PRESSURE - MUSE: NORMAL MMHG
EOSINOPHIL # BLD AUTO: 0 10E3/UL (ref 0–0.7)
EOSINOPHIL # BLD AUTO: 0.1 10E3/UL (ref 0–0.7)
EOSINOPHIL # BLD AUTO: 0.1 10E3/UL (ref 0–0.7)
EOSINOPHIL NFR BLD AUTO: 0 %
EOSINOPHIL NFR BLD AUTO: 0 %
EOSINOPHIL NFR BLD AUTO: 2 %
ERYTHROCYTE [DISTWIDTH] IN BLOOD BY AUTOMATED COUNT: 13.8 % (ref 10–15)
ERYTHROCYTE [DISTWIDTH] IN BLOOD BY AUTOMATED COUNT: 14 % (ref 10–15)
ERYTHROCYTE [DISTWIDTH] IN BLOOD BY AUTOMATED COUNT: 14 % (ref 10–15)
ERYTHROCYTE [DISTWIDTH] IN BLOOD BY AUTOMATED COUNT: 17.3 % (ref 10–15)
GFR SERPL CREATININE-BSD FRML MDRD: 50 ML/MIN/1.73M2
GFR SERPL CREATININE-BSD FRML MDRD: 78 ML/MIN/1.73M2
GFR SERPL CREATININE-BSD FRML MDRD: 84 ML/MIN/1.73M2
GFR SERPL CREATININE-BSD FRML MDRD: >90 ML/MIN/1.73M2
GLUCOSE BLD-MCNC: 108 MG/DL (ref 70–125)
GLUCOSE BLD-MCNC: 109 MG/DL (ref 70–125)
GLUCOSE BLD-MCNC: 118 MG/DL (ref 70–125)
GLUCOSE BLD-MCNC: 156 MG/DL (ref 70–125)
GLUCOSE BLD-MCNC: 182 MG/DL (ref 70–125)
GLUCOSE BLD-MCNC: 264 MG/DL (ref 70–125)
GLUCOSE BLD-MCNC: 86 MG/DL (ref 70–125)
GLUCOSE BLDC GLUCOMTR-MCNC: 101 MG/DL (ref 70–99)
GLUCOSE BLDC GLUCOMTR-MCNC: 106 MG/DL (ref 70–99)
GLUCOSE BLDC GLUCOMTR-MCNC: 131 MG/DL (ref 70–99)
GLUCOSE BLDC GLUCOMTR-MCNC: 138 MG/DL (ref 70–99)
GLUCOSE BLDC GLUCOMTR-MCNC: 145 MG/DL (ref 70–99)
GLUCOSE BLDC GLUCOMTR-MCNC: 166 MG/DL (ref 70–99)
GLUCOSE BLDC GLUCOMTR-MCNC: 175 MG/DL (ref 70–99)
GLUCOSE BLDC GLUCOMTR-MCNC: 180 MG/DL (ref 70–99)
GLUCOSE BLDC GLUCOMTR-MCNC: 182 MG/DL (ref 70–99)
GLUCOSE BLDC GLUCOMTR-MCNC: 195 MG/DL (ref 70–99)
GLUCOSE BLDC GLUCOMTR-MCNC: 196 MG/DL (ref 70–99)
GLUCOSE BLDC GLUCOMTR-MCNC: 212 MG/DL (ref 70–99)
GLUCOSE BLDC GLUCOMTR-MCNC: 226 MG/DL (ref 70–99)
GLUCOSE BLDC GLUCOMTR-MCNC: 91 MG/DL (ref 70–99)
GLUCOSE BLDC GLUCOMTR-MCNC: 94 MG/DL (ref 70–99)
GLUCOSE UR STRIP-MCNC: NEGATIVE MG/DL
HBA1C MFR BLD: 6.2 %
HCT VFR BLD AUTO: 25.6 % (ref 40–53)
HCT VFR BLD AUTO: 25.8 % (ref 40–53)
HCT VFR BLD AUTO: 33.2 % (ref 40–53)
HCT VFR BLD AUTO: 38.2 % (ref 40–53)
HGB BLD-MCNC: 10 G/DL (ref 13.3–17.7)
HGB BLD-MCNC: 10.7 G/DL (ref 13.3–17.7)
HGB BLD-MCNC: 12.7 G/DL (ref 13.3–17.7)
HGB BLD-MCNC: 7.9 G/DL (ref 13.3–17.7)
HGB BLD-MCNC: 8.2 G/DL (ref 13.3–17.7)
HGB BLD-MCNC: 8.6 G/DL (ref 13.3–17.7)
HGB BLD-MCNC: 9.3 G/DL (ref 13.3–17.7)
HGB UR QL STRIP: NEGATIVE
HOLD SPECIMEN: NORMAL
HYALINE CASTS: 1 /LPF
IMM GRANULOCYTES # BLD: 0.1 10E3/UL
IMM GRANULOCYTES NFR BLD: 0 %
IMM GRANULOCYTES NFR BLD: 1 %
IMM GRANULOCYTES NFR BLD: 2 %
INTERPRETATION ECG - MUSE: NORMAL
KETONES UR STRIP-MCNC: NEGATIVE MG/DL
LACOSAMIDE SERPL-MCNC: 13 UG/ML
LEUKOCYTE ESTERASE UR QL STRIP: ABNORMAL
LEVETIRACETAM (KEPPRA): 52.4 UG/ML (ref 6–46)
LYMPHOCYTES # BLD AUTO: 1.7 10E3/UL (ref 0.8–5.3)
LYMPHOCYTES # BLD AUTO: 2.1 10E3/UL (ref 0.8–5.3)
LYMPHOCYTES # BLD AUTO: 2.5 10E3/UL (ref 0.8–5.3)
LYMPHOCYTES NFR BLD AUTO: 15 %
LYMPHOCYTES NFR BLD AUTO: 22 %
LYMPHOCYTES NFR BLD AUTO: 26 %
MAGNESIUM SERPL-MCNC: 2 MG/DL (ref 1.8–2.6)
MCH RBC QN AUTO: 27.8 PG (ref 26.5–33)
MCH RBC QN AUTO: 28.1 PG (ref 26.5–33)
MCH RBC QN AUTO: 28.7 PG (ref 26.5–33)
MCH RBC QN AUTO: 28.9 PG (ref 26.5–33)
MCHC RBC AUTO-ENTMCNC: 30.6 G/DL (ref 31.5–36.5)
MCHC RBC AUTO-ENTMCNC: 32.2 G/DL (ref 31.5–36.5)
MCHC RBC AUTO-ENTMCNC: 33.2 G/DL (ref 31.5–36.5)
MCHC RBC AUTO-ENTMCNC: 33.6 G/DL (ref 31.5–36.5)
MCV RBC AUTO: 86 FL (ref 78–100)
MCV RBC AUTO: 86 FL (ref 78–100)
MCV RBC AUTO: 87 FL (ref 78–100)
MCV RBC AUTO: 91 FL (ref 78–100)
MONOCYTES # BLD AUTO: 0.6 10E3/UL (ref 0–1.3)
MONOCYTES # BLD AUTO: 0.8 10E3/UL (ref 0–1.3)
MONOCYTES # BLD AUTO: 0.8 10E3/UL (ref 0–1.3)
MONOCYTES NFR BLD AUTO: 6 %
MONOCYTES NFR BLD AUTO: 7 %
MONOCYTES NFR BLD AUTO: 9 %
NEUTROPHILS # BLD AUTO: 10.4 10E3/UL (ref 1.6–8.3)
NEUTROPHILS # BLD AUTO: 4.1 10E3/UL (ref 1.6–8.3)
NEUTROPHILS # BLD AUTO: 7.8 10E3/UL (ref 1.6–8.3)
NEUTROPHILS NFR BLD AUTO: 60 %
NEUTROPHILS NFR BLD AUTO: 69 %
NEUTROPHILS NFR BLD AUTO: 78 %
NITRATE UR QL: NEGATIVE
NRBC # BLD AUTO: 0 10E3/UL
NRBC BLD AUTO-RTO: 0 /100
P AXIS - MUSE: 49 DEGREES
PH UR STRIP: 6 [PH] (ref 5–7)
PHOSPHATE SERPL-MCNC: 3.4 MG/DL (ref 2.5–4.5)
PLATELET # BLD AUTO: 172 10E3/UL (ref 150–450)
PLATELET # BLD AUTO: 276 10E3/UL (ref 150–450)
PLATELET # BLD AUTO: 308 10E3/UL (ref 150–450)
PLATELET # BLD AUTO: 407 10E3/UL (ref 150–450)
POTASSIUM BLD-SCNC: 3.4 MMOL/L (ref 3.5–5)
POTASSIUM BLD-SCNC: 3.5 MMOL/L (ref 3.5–5)
POTASSIUM BLD-SCNC: 3.6 MMOL/L (ref 3.5–5)
POTASSIUM BLD-SCNC: 3.7 MMOL/L (ref 3.5–5)
POTASSIUM BLD-SCNC: 3.7 MMOL/L (ref 3.5–5)
POTASSIUM BLD-SCNC: 3.8 MMOL/L (ref 3.5–5)
POTASSIUM BLD-SCNC: 3.8 MMOL/L (ref 3.5–5)
POTASSIUM BLD-SCNC: 3.9 MMOL/L (ref 3.5–5)
POTASSIUM BLD-SCNC: 4.2 MMOL/L (ref 3.5–5)
POTASSIUM BLD-SCNC: 5.3 MMOL/L (ref 3.5–5)
PR INTERVAL - MUSE: 174 MS
PROCALCITONIN SERPL-MCNC: 0.04 NG/ML (ref 0–0.49)
PROT SERPL-MCNC: 7.5 G/DL (ref 6–8)
PROT SERPL-MCNC: 8.6 G/DL (ref 6–8)
QRS DURATION - MUSE: 92 MS
QT - MUSE: 382 MS
QTC - MUSE: 464 MS
R AXIS - MUSE: 33 DEGREES
RBC # BLD AUTO: 2.84 10E6/UL (ref 4.4–5.9)
RBC # BLD AUTO: 2.98 10E6/UL (ref 4.4–5.9)
RBC # BLD AUTO: 3.81 10E6/UL (ref 4.4–5.9)
RBC # BLD AUTO: 4.42 10E6/UL (ref 4.4–5.9)
RBC URINE: 2 /HPF
SARS-COV-2 RNA RESP QL NAA+PROBE: NEGATIVE
SODIUM SERPL-SCNC: 133 MMOL/L (ref 136–145)
SODIUM SERPL-SCNC: 135 MMOL/L (ref 136–145)
SODIUM SERPL-SCNC: 136 MMOL/L (ref 136–145)
SODIUM SERPL-SCNC: 137 MMOL/L (ref 136–145)
SODIUM SERPL-SCNC: 138 MMOL/L (ref 136–145)
SP GR UR STRIP: >1.05 (ref 1–1.03)
SQUAMOUS EPITHELIAL: 2 /HPF
SYSTOLIC BLOOD PRESSURE - MUSE: NORMAL MMHG
T AXIS - MUSE: 104 DEGREES
UROBILINOGEN UR STRIP-MCNC: <2 MG/DL
VENTRICULAR RATE- MUSE: 89 BPM
WBC # BLD AUTO: 11.2 10E3/UL (ref 4–11)
WBC # BLD AUTO: 13.3 10E3/UL (ref 4–11)
WBC # BLD AUTO: 6.6 10E3/UL (ref 4–11)
WBC # BLD AUTO: 6.7 10E3/UL (ref 4–11)
WBC URINE: 12 /HPF

## 2022-01-01 PROCEDURE — 96376 TX/PRO/DX INJ SAME DRUG ADON: CPT

## 2022-01-01 PROCEDURE — 36415 COLL VENOUS BLD VENIPUNCTURE: CPT | Performed by: NURSE PRACTITIONER

## 2022-01-01 PROCEDURE — 84145 PROCALCITONIN (PCT): CPT | Performed by: INTERNAL MEDICINE

## 2022-01-01 PROCEDURE — 87086 URINE CULTURE/COLONY COUNT: CPT | Performed by: EMERGENCY MEDICINE

## 2022-01-01 PROCEDURE — 36415 COLL VENOUS BLD VENIPUNCTURE: CPT | Performed by: INTERNAL MEDICINE

## 2022-01-01 PROCEDURE — 96372 THER/PROPH/DIAG INJ SC/IM: CPT | Performed by: HOSPITALIST

## 2022-01-01 PROCEDURE — 80048 BASIC METABOLIC PNL TOTAL CA: CPT | Performed by: NURSE PRACTITIONER

## 2022-01-01 PROCEDURE — 81001 URINALYSIS AUTO W/SCOPE: CPT | Performed by: EMERGENCY MEDICINE

## 2022-01-01 PROCEDURE — 250N000013 HC RX MED GY IP 250 OP 250 PS 637: Performed by: FAMILY MEDICINE

## 2022-01-01 PROCEDURE — 250N000011 HC RX IP 250 OP 636: Performed by: HOSPITALIST

## 2022-01-01 PROCEDURE — 99220 PR INITIAL OBSERVATION CARE,LEVEL III: CPT | Performed by: HOSPITALIST

## 2022-01-01 PROCEDURE — 84132 ASSAY OF SERUM POTASSIUM: CPT | Performed by: FAMILY MEDICINE

## 2022-01-01 PROCEDURE — 36415 COLL VENOUS BLD VENIPUNCTURE: CPT | Performed by: EMERGENCY MEDICINE

## 2022-01-01 PROCEDURE — 97535 SELF CARE MNGMENT TRAINING: CPT | Mod: GO

## 2022-01-01 PROCEDURE — 80235 DRUG ASSAY LACOSAMIDE: CPT | Performed by: HOSPITALIST

## 2022-01-01 PROCEDURE — 80053 COMPREHEN METABOLIC PANEL: CPT | Performed by: EMERGENCY MEDICINE

## 2022-01-01 PROCEDURE — 36415 COLL VENOUS BLD VENIPUNCTURE: CPT | Performed by: PHYSICIAN ASSISTANT

## 2022-01-01 PROCEDURE — 87635 SARS-COV-2 COVID-19 AMP PRB: CPT | Performed by: EMERGENCY MEDICINE

## 2022-01-01 PROCEDURE — 99226 PR SUBSEQUENT OBSERVATION CARE,LEVEL III: CPT | Performed by: FAMILY MEDICINE

## 2022-01-01 PROCEDURE — 96361 HYDRATE IV INFUSION ADD-ON: CPT

## 2022-01-01 PROCEDURE — 97530 THERAPEUTIC ACTIVITIES: CPT | Mod: GO

## 2022-01-01 PROCEDURE — 80048 BASIC METABOLIC PNL TOTAL CA: CPT | Mod: ORL | Performed by: NURSE PRACTITIONER

## 2022-01-01 PROCEDURE — G0378 HOSPITAL OBSERVATION PER HR: HCPCS

## 2022-01-01 PROCEDURE — C9803 HOPD COVID-19 SPEC COLLECT: HCPCS

## 2022-01-01 PROCEDURE — 250N000013 HC RX MED GY IP 250 OP 250 PS 637: Performed by: INTERNAL MEDICINE

## 2022-01-01 PROCEDURE — 82962 GLUCOSE BLOOD TEST: CPT

## 2022-01-01 PROCEDURE — 36415 COLL VENOUS BLD VENIPUNCTURE: CPT | Mod: ORL | Performed by: NURSE PRACTITIONER

## 2022-01-01 PROCEDURE — 84100 ASSAY OF PHOSPHORUS: CPT | Performed by: HOSPITALIST

## 2022-01-01 PROCEDURE — 97166 OT EVAL MOD COMPLEX 45 MIN: CPT | Mod: GO

## 2022-01-01 PROCEDURE — 74177 CT ABD & PELVIS W/CONTRAST: CPT

## 2022-01-01 PROCEDURE — 71045 X-RAY EXAM CHEST 1 VIEW: CPT

## 2022-01-01 PROCEDURE — 80177 DRUG SCRN QUAN LEVETIRACETAM: CPT | Performed by: HOSPITALIST

## 2022-01-01 PROCEDURE — 80048 BASIC METABOLIC PNL TOTAL CA: CPT | Performed by: INTERNAL MEDICINE

## 2022-01-01 PROCEDURE — 250N000013 HC RX MED GY IP 250 OP 250 PS 637: Performed by: HOSPITALIST

## 2022-01-01 PROCEDURE — 83036 HEMOGLOBIN GLYCOSYLATED A1C: CPT | Performed by: HOSPITALIST

## 2022-01-01 PROCEDURE — 99215 OFFICE O/P EST HI 40 MIN: CPT | Performed by: PSYCHIATRY & NEUROLOGY

## 2022-01-01 PROCEDURE — 96372 THER/PROPH/DIAG INJ SC/IM: CPT | Performed by: INTERNAL MEDICINE

## 2022-01-01 PROCEDURE — 250N000011 HC RX IP 250 OP 636: Performed by: EMERGENCY MEDICINE

## 2022-01-01 PROCEDURE — 85018 HEMOGLOBIN: CPT | Mod: ORL | Performed by: NURSE PRACTITIONER

## 2022-01-01 PROCEDURE — 97116 GAIT TRAINING THERAPY: CPT | Mod: GP

## 2022-01-01 PROCEDURE — 97110 THERAPEUTIC EXERCISES: CPT | Mod: GO

## 2022-01-01 PROCEDURE — 258N000003 HC RX IP 258 OP 636: Performed by: EMERGENCY MEDICINE

## 2022-01-01 PROCEDURE — 250N000012 HC RX MED GY IP 250 OP 636 PS 637: Performed by: INTERNAL MEDICINE

## 2022-01-01 PROCEDURE — P9604 ONE-WAY ALLOW PRORATED TRIP: HCPCS | Performed by: NURSE PRACTITIONER

## 2022-01-01 PROCEDURE — 86140 C-REACTIVE PROTEIN: CPT | Performed by: INTERNAL MEDICINE

## 2022-01-01 PROCEDURE — 85025 COMPLETE CBC W/AUTO DIFF WBC: CPT | Performed by: EMERGENCY MEDICINE

## 2022-01-01 PROCEDURE — 80053 COMPREHEN METABOLIC PANEL: CPT | Performed by: INTERNAL MEDICINE

## 2022-01-01 PROCEDURE — 92610 EVALUATE SWALLOWING FUNCTION: CPT | Mod: GN

## 2022-01-01 PROCEDURE — 82310 ASSAY OF CALCIUM: CPT | Performed by: INTERNAL MEDICINE

## 2022-01-01 PROCEDURE — 99226 PR SUBSEQUENT OBSERVATION CARE,LEVEL III: CPT | Performed by: INTERNAL MEDICINE

## 2022-01-01 PROCEDURE — 250N000011 HC RX IP 250 OP 636: Performed by: INTERNAL MEDICINE

## 2022-01-01 PROCEDURE — 36415 COLL VENOUS BLD VENIPUNCTURE: CPT | Performed by: FAMILY MEDICINE

## 2022-01-01 PROCEDURE — 99213 OFFICE O/P EST LOW 20 MIN: CPT | Performed by: PSYCHIATRY & NEUROLOGY

## 2022-01-01 PROCEDURE — 99214 OFFICE O/P EST MOD 30 MIN: CPT | Performed by: PSYCHIATRY & NEUROLOGY

## 2022-01-01 PROCEDURE — 97162 PT EVAL MOD COMPLEX 30 MIN: CPT | Mod: GP

## 2022-01-01 PROCEDURE — 99214 OFFICE O/P EST MOD 30 MIN: CPT | Performed by: PHYSICIAN ASSISTANT

## 2022-01-01 PROCEDURE — 85018 HEMOGLOBIN: CPT | Performed by: PHYSICIAN ASSISTANT

## 2022-01-01 PROCEDURE — 93005 ELECTROCARDIOGRAM TRACING: CPT | Performed by: HOSPITALIST

## 2022-01-01 PROCEDURE — 85014 HEMATOCRIT: CPT | Performed by: INTERNAL MEDICINE

## 2022-01-01 PROCEDURE — 99215 OFFICE O/P EST HI 40 MIN: CPT | Performed by: PHYSICIAN ASSISTANT

## 2022-01-01 PROCEDURE — 97110 THERAPEUTIC EXERCISES: CPT | Mod: GP

## 2022-01-01 PROCEDURE — 99217 PR OBSERVATION CARE DISCHARGE: CPT | Performed by: FAMILY MEDICINE

## 2022-01-01 PROCEDURE — 70450 CT HEAD/BRAIN W/O DYE: CPT

## 2022-01-01 PROCEDURE — 85004 AUTOMATED DIFF WBC COUNT: CPT | Performed by: HOSPITALIST

## 2022-01-01 PROCEDURE — 258N000003 HC RX IP 258 OP 636: Performed by: HOSPITALIST

## 2022-01-01 PROCEDURE — P9604 ONE-WAY ALLOW PRORATED TRIP: HCPCS | Mod: ORL | Performed by: NURSE PRACTITIONER

## 2022-01-01 PROCEDURE — 80048 BASIC METABOLIC PNL TOTAL CA: CPT | Performed by: PHYSICIAN ASSISTANT

## 2022-01-01 PROCEDURE — 96365 THER/PROPH/DIAG IV INF INIT: CPT

## 2022-01-01 PROCEDURE — 85018 HEMOGLOBIN: CPT | Performed by: FAMILY MEDICINE

## 2022-01-01 PROCEDURE — 87040 BLOOD CULTURE FOR BACTERIA: CPT | Performed by: INTERNAL MEDICINE

## 2022-01-01 PROCEDURE — 99225 PR SUBSEQUENT OBSERVATION CARE,LEVEL II: CPT | Performed by: FAMILY MEDICINE

## 2022-01-01 PROCEDURE — 85025 COMPLETE CBC W/AUTO DIFF WBC: CPT | Performed by: NURSE PRACTITIONER

## 2022-01-01 PROCEDURE — 82550 ASSAY OF CK (CPK): CPT | Performed by: HOSPITALIST

## 2022-01-01 PROCEDURE — 250N000012 HC RX MED GY IP 250 OP 636 PS 637: Performed by: HOSPITALIST

## 2022-01-01 PROCEDURE — 99285 EMERGENCY DEPT VISIT HI MDM: CPT | Mod: 25

## 2022-01-01 PROCEDURE — 83735 ASSAY OF MAGNESIUM: CPT | Performed by: HOSPITALIST

## 2022-01-01 PROCEDURE — 73070 X-RAY EXAM OF ELBOW: CPT | Mod: RT

## 2022-01-01 PROCEDURE — 99417 PROLNG OP E/M EACH 15 MIN: CPT | Performed by: PSYCHIATRY & NEUROLOGY

## 2022-01-01 RX ORDER — BISACODYL 10 MG
10 SUPPOSITORY, RECTAL RECTAL DAILY PRN
Status: DISCONTINUED | OUTPATIENT
Start: 2022-01-01 | End: 2022-01-01 | Stop reason: HOSPADM

## 2022-01-01 RX ORDER — LANOLIN ALCOHOL/MO/W.PET/CERES
3 CREAM (GRAM) TOPICAL
Status: DISCONTINUED | OUTPATIENT
Start: 2022-01-01 | End: 2022-01-01 | Stop reason: HOSPADM

## 2022-01-01 RX ORDER — POTASSIUM CHLORIDE 1500 MG/1
40 TABLET, EXTENDED RELEASE ORAL ONCE
Status: COMPLETED | OUTPATIENT
Start: 2022-01-01 | End: 2022-01-01

## 2022-01-01 RX ORDER — SODIUM CHLORIDE 9 MG/ML
INJECTION, SOLUTION INTRAVENOUS CONTINUOUS
Status: DISCONTINUED | OUTPATIENT
Start: 2022-01-01 | End: 2022-01-01

## 2022-01-01 RX ORDER — HYDROCHLOROTHIAZIDE 50 MG/1
TABLET ORAL
COMMUNITY
End: 2022-01-01

## 2022-01-01 RX ORDER — HYDRALAZINE HYDROCHLORIDE 20 MG/ML
10 INJECTION INTRAMUSCULAR; INTRAVENOUS EVERY 6 HOURS PRN
Status: DISCONTINUED | OUTPATIENT
Start: 2022-01-01 | End: 2022-01-01 | Stop reason: HOSPADM

## 2022-01-01 RX ORDER — DULAGLUTIDE 0.75 MG/.5ML
INJECTION, SOLUTION SUBCUTANEOUS
COMMUNITY
End: 2022-01-01

## 2022-01-01 RX ORDER — CEFAZOLIN SODIUM 1 G/3ML
1 INJECTION, POWDER, FOR SOLUTION INTRAMUSCULAR; INTRAVENOUS EVERY 8 HOURS
Status: DISCONTINUED | OUTPATIENT
Start: 2022-01-01 | End: 2022-01-01

## 2022-01-01 RX ORDER — DULAGLUTIDE 0.75 MG/.5ML
0.75 INJECTION, SOLUTION SUBCUTANEOUS
Status: ON HOLD | COMMUNITY
Start: 2022-01-01 | End: 2022-01-01

## 2022-01-01 RX ORDER — LACOSAMIDE 200 MG/1
200 TABLET, FILM COATED ORAL 2 TIMES DAILY
COMMUNITY
Start: 2022-01-01 | End: 2022-01-01

## 2022-01-01 RX ORDER — FUROSEMIDE 40 MG
TABLET ORAL
COMMUNITY
Start: 2022-01-01 | End: 2022-01-01

## 2022-01-01 RX ORDER — NICOTINE POLACRILEX 4 MG
15-30 LOZENGE BUCCAL
Status: DISCONTINUED | OUTPATIENT
Start: 2022-01-01 | End: 2022-01-01 | Stop reason: HOSPADM

## 2022-01-01 RX ORDER — LOSARTAN POTASSIUM 25 MG/1
50 TABLET ORAL DAILY
Status: DISCONTINUED | OUTPATIENT
Start: 2022-01-01 | End: 2022-01-01 | Stop reason: HOSPADM

## 2022-01-01 RX ORDER — AMOXICILLIN 250 MG
1 CAPSULE ORAL 2 TIMES DAILY
Status: DISCONTINUED | OUTPATIENT
Start: 2022-01-01 | End: 2022-01-01 | Stop reason: HOSPADM

## 2022-01-01 RX ORDER — PROCHLORPERAZINE MALEATE 5 MG
5 TABLET ORAL EVERY 6 HOURS PRN
Status: DISCONTINUED | OUTPATIENT
Start: 2022-01-01 | End: 2022-01-01 | Stop reason: HOSPADM

## 2022-01-01 RX ORDER — POLYETHYLENE GLYCOL 3350 17 G/17G
1 POWDER, FOR SOLUTION ORAL 2 TIMES DAILY
COMMUNITY

## 2022-01-01 RX ORDER — ASPIRIN 81 MG/1
81 TABLET ORAL DAILY
COMMUNITY

## 2022-01-01 RX ORDER — LACOSAMIDE 200 MG/1
200 TABLET ORAL 2 TIMES DAILY
Qty: 180 TABLET | Refills: 3 | Status: SHIPPED | OUTPATIENT
Start: 2022-01-01 | End: 2022-06-20

## 2022-01-01 RX ORDER — INSULIN GLARGINE 100 [IU]/ML
38 INJECTION, SOLUTION SUBCUTANEOUS 2 TIMES DAILY
Status: ON HOLD | COMMUNITY
Start: 2022-01-01 | End: 2022-01-01

## 2022-01-01 RX ORDER — ATORVASTATIN CALCIUM 40 MG/1
80 TABLET, FILM COATED ORAL AT BEDTIME
Status: DISCONTINUED | OUTPATIENT
Start: 2022-01-01 | End: 2022-01-01 | Stop reason: HOSPADM

## 2022-01-01 RX ORDER — ACETAMINOPHEN 325 MG/1
650 TABLET ORAL EVERY 6 HOURS PRN
Status: DISCONTINUED | OUTPATIENT
Start: 2022-01-01 | End: 2022-01-01

## 2022-01-01 RX ORDER — ACETAMINOPHEN 325 MG/1
975 TABLET ORAL 3 TIMES DAILY
Start: 2022-01-01

## 2022-01-01 RX ORDER — FAMOTIDINE 20 MG/1
20 TABLET, FILM COATED ORAL 2 TIMES DAILY PRN
COMMUNITY

## 2022-01-01 RX ORDER — AMOXICILLIN 250 MG
2 CAPSULE ORAL 2 TIMES DAILY
Status: DISCONTINUED | OUTPATIENT
Start: 2022-01-01 | End: 2022-01-01 | Stop reason: HOSPADM

## 2022-01-01 RX ORDER — POLYETHYLENE GLYCOL 3350 17 G/17G
17 POWDER, FOR SOLUTION ORAL 2 TIMES DAILY
Status: DISCONTINUED | OUTPATIENT
Start: 2022-01-01 | End: 2022-01-01 | Stop reason: HOSPADM

## 2022-01-01 RX ORDER — HYDROCHLOROTHIAZIDE 50 MG/1
75 TABLET ORAL DAILY
Qty: 135 TABLET | Refills: 3 | Status: ON HOLD
Start: 2022-01-01 | End: 2022-01-01

## 2022-01-01 RX ORDER — INSULIN GLARGINE 100 [IU]/ML
20 INJECTION, SOLUTION SUBCUTANEOUS AT BEDTIME
Qty: 15 ML
Start: 2022-01-01 | End: 2022-01-01

## 2022-01-01 RX ORDER — DEXTROSE MONOHYDRATE 25 G/50ML
25-50 INJECTION, SOLUTION INTRAVENOUS
Status: DISCONTINUED | OUTPATIENT
Start: 2022-01-01 | End: 2022-01-01 | Stop reason: HOSPADM

## 2022-01-01 RX ORDER — SERTRALINE HYDROCHLORIDE 25 MG/1
25 TABLET, FILM COATED ORAL AT BEDTIME
Status: DISCONTINUED | OUTPATIENT
Start: 2022-01-01 | End: 2022-01-01 | Stop reason: HOSPADM

## 2022-01-01 RX ORDER — FAMOTIDINE 20 MG/1
20 TABLET, FILM COATED ORAL 2 TIMES DAILY
Status: DISCONTINUED | OUTPATIENT
Start: 2022-01-01 | End: 2022-01-01 | Stop reason: HOSPADM

## 2022-01-01 RX ORDER — ACETAMINOPHEN 325 MG/1
650 TABLET ORAL EVERY 4 HOURS PRN
Status: DISCONTINUED | OUTPATIENT
Start: 2022-01-01 | End: 2022-01-01

## 2022-01-01 RX ORDER — ACETAMINOPHEN 325 MG/1
975 TABLET ORAL 3 TIMES DAILY
Status: DISCONTINUED | OUTPATIENT
Start: 2022-01-01 | End: 2022-01-01 | Stop reason: HOSPADM

## 2022-01-01 RX ORDER — ACETAMINOPHEN 650 MG/1
650 SUPPOSITORY RECTAL EVERY 6 HOURS PRN
Status: DISCONTINUED | OUTPATIENT
Start: 2022-01-01 | End: 2022-01-01

## 2022-01-01 RX ORDER — LACOSAMIDE 50 MG/1
200 TABLET ORAL 2 TIMES DAILY
Status: DISCONTINUED | OUTPATIENT
Start: 2022-01-01 | End: 2022-01-01 | Stop reason: HOSPADM

## 2022-01-01 RX ORDER — PROCHLORPERAZINE 25 MG
12.5 SUPPOSITORY, RECTAL RECTAL EVERY 12 HOURS PRN
Status: DISCONTINUED | OUTPATIENT
Start: 2022-01-01 | End: 2022-01-01 | Stop reason: HOSPADM

## 2022-01-01 RX ORDER — IOPAMIDOL 755 MG/ML
75 INJECTION, SOLUTION INTRAVASCULAR ONCE
Status: COMPLETED | OUTPATIENT
Start: 2022-01-01 | End: 2022-01-01

## 2022-01-01 RX ORDER — CALCIUM CITRATE/VITAMIN D3 315MG-6.25
TABLET ORAL
COMMUNITY

## 2022-01-01 RX ORDER — ASPIRIN 81 MG/1
81 TABLET ORAL DAILY
Status: DISCONTINUED | OUTPATIENT
Start: 2022-01-01 | End: 2022-01-01 | Stop reason: HOSPADM

## 2022-01-01 RX ORDER — SERTRALINE HYDROCHLORIDE 25 MG/1
25 TABLET, FILM COATED ORAL DAILY
COMMUNITY
Start: 2022-01-01

## 2022-01-01 RX ORDER — INSULIN GLARGINE-YFGN 100 [IU]/ML
INJECTION, SOLUTION SUBCUTANEOUS
COMMUNITY
Start: 2022-01-01 | End: 2022-01-01

## 2022-01-01 RX ORDER — LEVETIRACETAM 500 MG/1
1500 TABLET ORAL 2 TIMES DAILY
Status: DISCONTINUED | OUTPATIENT
Start: 2022-01-01 | End: 2022-01-01 | Stop reason: HOSPADM

## 2022-01-01 RX ORDER — INSULIN GLARGINE 100 [IU]/ML
20 INJECTION, SOLUTION SUBCUTANEOUS AT BEDTIME
Qty: 15 ML | Refills: 0 | Status: SHIPPED | OUTPATIENT
Start: 2022-01-01

## 2022-01-01 RX ADMIN — POLYETHYLENE GLYCOL 3350 17 G: 17 POWDER, FOR SOLUTION ORAL at 08:00

## 2022-01-01 RX ADMIN — ASPIRIN 81 MG: 81 TABLET, COATED ORAL at 08:00

## 2022-01-01 RX ADMIN — INSULIN ASPART 2 UNITS: 100 INJECTION, SOLUTION INTRAVENOUS; SUBCUTANEOUS at 16:41

## 2022-01-01 RX ADMIN — ASPIRIN 81 MG: 81 TABLET, COATED ORAL at 08:28

## 2022-01-01 RX ADMIN — ATORVASTATIN CALCIUM 80 MG: 40 TABLET, FILM COATED ORAL at 21:51

## 2022-01-01 RX ADMIN — LOSARTAN POTASSIUM 50 MG: 25 TABLET, FILM COATED ORAL at 08:00

## 2022-01-01 RX ADMIN — LEVETIRACETAM 1500 MG: 500 TABLET, FILM COATED ORAL at 08:19

## 2022-01-01 RX ADMIN — ENOXAPARIN SODIUM 80 MG: 80 INJECTION SUBCUTANEOUS at 05:14

## 2022-01-01 RX ADMIN — SERTRALINE HYDROCHLORIDE 25 MG: 25 TABLET ORAL at 22:08

## 2022-01-01 RX ADMIN — POTASSIUM CHLORIDE 40 MEQ: 1500 TABLET, EXTENDED RELEASE ORAL at 16:04

## 2022-01-01 RX ADMIN — LOSARTAN POTASSIUM 50 MG: 25 TABLET, FILM COATED ORAL at 08:20

## 2022-01-01 RX ADMIN — DOCUSATE SODIUM 50 MG AND SENNOSIDES 8.6 MG 1 TABLET: 8.6; 5 TABLET, FILM COATED ORAL at 07:59

## 2022-01-01 RX ADMIN — LEVETIRACETAM 1500 MG: 500 TABLET, FILM COATED ORAL at 08:31

## 2022-01-01 RX ADMIN — LACOSAMIDE 200 MG: 50 TABLET, FILM COATED ORAL at 08:27

## 2022-01-01 RX ADMIN — LOSARTAN POTASSIUM 50 MG: 25 TABLET, FILM COATED ORAL at 16:34

## 2022-01-01 RX ADMIN — ACETAMINOPHEN 975 MG: 325 TABLET ORAL at 13:18

## 2022-01-01 RX ADMIN — ACETAMINOPHEN 650 MG: 325 TABLET ORAL at 06:19

## 2022-01-01 RX ADMIN — FAMOTIDINE 20 MG: 20 TABLET, FILM COATED ORAL at 08:20

## 2022-01-01 RX ADMIN — ACETAMINOPHEN 650 MG: 325 TABLET ORAL at 00:22

## 2022-01-01 RX ADMIN — ENOXAPARIN SODIUM 80 MG: 80 INJECTION SUBCUTANEOUS at 06:11

## 2022-01-01 RX ADMIN — LACOSAMIDE 200 MG: 50 TABLET, FILM COATED ORAL at 08:00

## 2022-01-01 RX ADMIN — INSULIN ASPART 1 UNITS: 100 INJECTION, SOLUTION INTRAVENOUS; SUBCUTANEOUS at 13:59

## 2022-01-01 RX ADMIN — CEFAZOLIN 1 G: 1 INJECTION, POWDER, FOR SOLUTION INTRAMUSCULAR; INTRAVENOUS at 00:32

## 2022-01-01 RX ADMIN — INSULIN GLARGINE 20 UNITS: 100 INJECTION, SOLUTION SUBCUTANEOUS at 22:10

## 2022-01-01 RX ADMIN — SERTRALINE HYDROCHLORIDE 25 MG: 25 TABLET ORAL at 21:32

## 2022-01-01 RX ADMIN — ENOXAPARIN SODIUM 80 MG: 80 INJECTION SUBCUTANEOUS at 06:21

## 2022-01-01 RX ADMIN — ASPIRIN 81 MG: 81 TABLET, COATED ORAL at 08:32

## 2022-01-01 RX ADMIN — LACOSAMIDE 200 MG: 50 TABLET, FILM COATED ORAL at 21:23

## 2022-01-01 RX ADMIN — ACETAMINOPHEN 650 MG: 325 TABLET ORAL at 08:28

## 2022-01-01 RX ADMIN — POLYETHYLENE GLYCOL 3350 17 G: 17 POWDER, FOR SOLUTION ORAL at 22:17

## 2022-01-01 RX ADMIN — FAMOTIDINE 20 MG: 20 TABLET, FILM COATED ORAL at 08:00

## 2022-01-01 RX ADMIN — POLYETHYLENE GLYCOL 3350 17 G: 17 POWDER, FOR SOLUTION ORAL at 08:33

## 2022-01-01 RX ADMIN — LACOSAMIDE 200 MG: 50 TABLET, FILM COATED ORAL at 20:29

## 2022-01-01 RX ADMIN — LEVETIRACETAM 1500 MG: 500 TABLET, FILM COATED ORAL at 08:00

## 2022-01-01 RX ADMIN — SODIUM CHLORIDE: 9 INJECTION, SOLUTION INTRAVENOUS at 01:02

## 2022-01-01 RX ADMIN — LACOSAMIDE 200 MG: 50 TABLET, FILM COATED ORAL at 22:07

## 2022-01-01 RX ADMIN — ENOXAPARIN SODIUM 80 MG: 80 INJECTION SUBCUTANEOUS at 06:50

## 2022-01-01 RX ADMIN — POLYETHYLENE GLYCOL 3350 17 G: 17 POWDER, FOR SOLUTION ORAL at 21:26

## 2022-01-01 RX ADMIN — INSULIN ASPART 1 UNITS: 100 INJECTION, SOLUTION INTRAVENOUS; SUBCUTANEOUS at 12:08

## 2022-01-01 RX ADMIN — FAMOTIDINE 20 MG: 20 TABLET, FILM COATED ORAL at 22:07

## 2022-01-01 RX ADMIN — FAMOTIDINE 20 MG: 20 TABLET, FILM COATED ORAL at 21:23

## 2022-01-01 RX ADMIN — FAMOTIDINE 20 MG: 20 TABLET, FILM COATED ORAL at 08:32

## 2022-01-01 RX ADMIN — ATORVASTATIN CALCIUM 80 MG: 40 TABLET, FILM COATED ORAL at 22:07

## 2022-01-01 RX ADMIN — POLYETHYLENE GLYCOL 3350 17 G: 17 POWDER, FOR SOLUTION ORAL at 08:21

## 2022-01-01 RX ADMIN — ACETAMINOPHEN 975 MG: 325 TABLET ORAL at 20:30

## 2022-01-01 RX ADMIN — SERTRALINE HYDROCHLORIDE 25 MG: 25 TABLET ORAL at 21:51

## 2022-01-01 RX ADMIN — ASPIRIN 81 MG: 81 TABLET, COATED ORAL at 08:19

## 2022-01-01 RX ADMIN — INSULIN GLARGINE 20 UNITS: 100 INJECTION, SOLUTION SUBCUTANEOUS at 21:50

## 2022-01-01 RX ADMIN — CEFAZOLIN 1 G: 1 INJECTION, POWDER, FOR SOLUTION INTRAMUSCULAR; INTRAVENOUS at 17:07

## 2022-01-01 RX ADMIN — ATORVASTATIN CALCIUM 80 MG: 40 TABLET, FILM COATED ORAL at 21:22

## 2022-01-01 RX ADMIN — ENOXAPARIN SODIUM 80 MG: 80 INJECTION SUBCUTANEOUS at 17:22

## 2022-01-01 RX ADMIN — ENOXAPARIN SODIUM 80 MG: 80 INJECTION SUBCUTANEOUS at 19:16

## 2022-01-01 RX ADMIN — LEVETIRACETAM 1500 MG: 500 TABLET, FILM COATED ORAL at 21:22

## 2022-01-01 RX ADMIN — CEFAZOLIN 1 G: 1 INJECTION, POWDER, FOR SOLUTION INTRAMUSCULAR; INTRAVENOUS at 08:28

## 2022-01-01 RX ADMIN — ACETAMINOPHEN 975 MG: 325 TABLET ORAL at 08:00

## 2022-01-01 RX ADMIN — CEFAZOLIN 1 G: 1 INJECTION, POWDER, FOR SOLUTION INTRAMUSCULAR; INTRAVENOUS at 08:54

## 2022-01-01 RX ADMIN — FAMOTIDINE 20 MG: 20 TABLET, FILM COATED ORAL at 08:27

## 2022-01-01 RX ADMIN — ACETAMINOPHEN 975 MG: 325 TABLET ORAL at 14:22

## 2022-01-01 RX ADMIN — ENOXAPARIN SODIUM 80 MG: 80 INJECTION SUBCUTANEOUS at 17:47

## 2022-01-01 RX ADMIN — LEVETIRACETAM 1500 MG: 500 TABLET, FILM COATED ORAL at 22:07

## 2022-01-01 RX ADMIN — DOCUSATE SODIUM 50 MG AND SENNOSIDES 8.6 MG 1 TABLET: 8.6; 5 TABLET, FILM COATED ORAL at 21:21

## 2022-01-01 RX ADMIN — LEVETIRACETAM 1500 MG: 500 TABLET, FILM COATED ORAL at 08:27

## 2022-01-01 RX ADMIN — DOCUSATE SODIUM 50 MG AND SENNOSIDES 8.6 MG 1 TABLET: 8.6; 5 TABLET, FILM COATED ORAL at 20:30

## 2022-01-01 RX ADMIN — POLYETHYLENE GLYCOL 3350 17 G: 17 POWDER, FOR SOLUTION ORAL at 20:30

## 2022-01-01 RX ADMIN — FAMOTIDINE 20 MG: 20 TABLET, FILM COATED ORAL at 20:30

## 2022-01-01 RX ADMIN — LEVETIRACETAM 1500 MG: 500 TABLET, FILM COATED ORAL at 20:30

## 2022-01-01 RX ADMIN — POLYETHYLENE GLYCOL 3350 17 G: 17 POWDER, FOR SOLUTION ORAL at 08:28

## 2022-01-01 RX ADMIN — INSULIN ASPART 2 UNITS: 100 INJECTION, SOLUTION INTRAVENOUS; SUBCUTANEOUS at 17:45

## 2022-01-01 RX ADMIN — IOPAMIDOL 75 ML: 755 INJECTION, SOLUTION INTRAVENOUS at 20:10

## 2022-01-01 RX ADMIN — SODIUM CHLORIDE 1000 ML: 9 INJECTION, SOLUTION INTRAVENOUS at 19:37

## 2022-01-01 RX ADMIN — DOCUSATE SODIUM AND SENNOSIDES 2 TABLET: 8.6; 5 TABLET, FILM COATED ORAL at 08:55

## 2022-01-01 RX ADMIN — INSULIN GLARGINE 20 UNITS: 100 INJECTION, SOLUTION SUBCUTANEOUS at 21:32

## 2022-01-01 RX ADMIN — LACOSAMIDE 200 MG: 50 TABLET, FILM COATED ORAL at 08:20

## 2022-01-01 RX ADMIN — LACOSAMIDE 200 MG: 50 TABLET, FILM COATED ORAL at 08:31

## 2022-01-01 RX ADMIN — INSULIN ASPART 1 UNITS: 100 INJECTION, SOLUTION INTRAVENOUS; SUBCUTANEOUS at 17:21

## 2022-01-01 ASSESSMENT — ACTIVITIES OF DAILY LIVING (ADL)
DEPENDENT_IADLS:: TRANSPORTATION
PREVIOUS_RESPONSIBILITIES: MEAL PREP;HOUSEKEEPING

## 2022-03-08 NOTE — TELEPHONE ENCOUNTER
The patient needs a hospital follow up to meet with a provider and discuss. They have scheduled a hospital follow up but it is not until the end of March 2021. Would provider like support staff to appoint the patient with provider sooner or with other available associate to address these questions? Thank you for reviewing!

## 2022-03-08 NOTE — TELEPHONE ENCOUNTER
See message below    Called patient's daughter back (C2C on file) and patient was discharge from NH yesterday and taking Lovenox. Patient unable to inject himself as he is afraid of needles. Patient's daughter and son live 45 minutes away and have been giving patient the Lovenox injections, so no missed doses. Patient is unsteady on his feet per daughter and should not be alone. NH appealed patient's discharge, but insurance would not approve more time.   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    Patient's daughter wondering if patient can take oral blood thinner as patient unable to give himself the Lovenox injections?   Family also asking if patient can have PCA or home care services?    Appointment scheduled tomorrow, 3/9/2022 with Ashley DAWSON    Message routed to Ashley DAWSON for FYI only    Yoon Willis RN  [facility]          Detailed comments: patients daughter martin calling, she has questions about injections for her father,, he was just released from the hospital. She said he needs 2 injections daily and he isnt able to do it,, she has questions. Can we please call her back?

## 2022-03-08 NOTE — TELEPHONE ENCOUNTER
Reason for Call:  Other     Detailed comments: patients daughter is calling. Dad was released from the hospital yesterday. On the discharge papers it says dont take losarton if bp is under 110. First of all he needs a refill on the losarten but he doesnt have a bp cup so should he even be taking that rx? Can we please call martin (daughter) to clarify?    Phone Number Patient can be reached at: Other phone number:  [phone]    Best Time: asap    Can we leave a detailed message on this number? YES    Call taken on 3/8/2022 at 8:50 AM by Abi Miles

## 2022-03-08 NOTE — TELEPHONE ENCOUNTER
Reason for Call:  Other     Detailed comments: patients daughter martin calling, she has questions about injections for her father,, he was just released from the hospital. She said he needs 2 injections daily and he isnt able to do it,, she has questions. Can we please call her back?    Phone Number Patient can be reached at: Other phone number:  [phone]    Best Time: any    Can we leave a detailed message on this number? YES    Call taken on 3/8/2022 at 11:00 AM by Abi Miles

## 2022-03-09 PROBLEM — N30.00 ACUTE CYSTITIS: Status: ACTIVE | Noted: 2022-01-01

## 2022-03-09 PROBLEM — G40.901 STATUS EPILEPTICUS (H): Status: ACTIVE | Noted: 2021-01-01

## 2022-03-09 PROBLEM — E87.1 HYPONATREMIA: Status: ACTIVE | Noted: 2021-01-01

## 2022-03-09 PROBLEM — J96.00 ACUTE RESPIRATORY FAILURE (H): Status: ACTIVE | Noted: 2021-01-01

## 2022-03-09 PROBLEM — I26.92 SADDLE PULMONARY EMBOLUS (H): Status: ACTIVE | Noted: 2022-01-01

## 2022-03-09 PROBLEM — J95.01 HEMORRHAGE FROM TRACHEOSTOMY STOMA (H): Status: ACTIVE | Noted: 2022-01-01

## 2022-03-09 PROBLEM — I63.9 CEREBRAL INFARCTION (H): Status: ACTIVE | Noted: 2022-01-01

## 2022-03-09 PROBLEM — D62 ACUTE BLOOD LOSS ANEMIA: Status: ACTIVE | Noted: 2022-01-01

## 2022-03-09 PROBLEM — R56.9 SEIZURES (H): Status: ACTIVE | Noted: 2022-01-01

## 2022-03-09 PROBLEM — N28.9 ACUTE RENAL INSUFFICIENCY: Status: ACTIVE | Noted: 2021-01-01

## 2022-03-09 PROBLEM — Z09 HOSPITAL DISCHARGE FOLLOW-UP: Status: ACTIVE | Noted: 2022-01-01

## 2022-03-09 PROBLEM — G40.219 EPILEPSY CHARACTERIZED BY INTRACTABLE COMPLEX PARTIAL SEIZURES (H): Status: ACTIVE | Noted: 2022-01-01

## 2022-03-09 NOTE — TELEPHONE ENCOUNTER
All concerns addressed at visit today with patient and his daughter.  Referral put in for care management.  Referral put in for home nurse assessment.  Prescription sent for freestyle vincent meter.  See office note for details.

## 2022-03-09 NOTE — PROGRESS NOTES
"  Subjective:    Jorge Young is a 66 year old male who presents with chief complaint of hospital discharge and TCU follow-up.  History of stroke.  He was seen at our clinic on 11/3/2021 and determined that probably another stroke was occurring and he was sent immediately to the hospital.  Hospitalized from 11/3/2021 to 11/6/2021.  I saw him for hospital discharge follow-up in the clinic on 11/17/2021.  He had a carotid endarterectomy for symptomatic disease with episodes of of aphasia.  He was admitted again to the hospital on 12/18/2021 again for stroke.  [facility].  He has been in the hospital off and on since then.  He was discharged to a TCU on 1/20/2022.  However he was readmitted on 1/21/2022 because of bleeding from his tracheostomy site.  He stayed a week, was discharged 1/26/2022.  He then went to TCU again.  He was discharged from TCU on Monday, 3/7/2022.  He is here today with his daughter for follow-up.      He lives alone.  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  He cannot give himself his Lovenox injections because his hands shake.  He notes he has balance issues when he walks.  He is seeing ENT tomorrow to examine his tracheotomy site.  Overall that looks good today, small amount of granulation tissue present.  He has been having occasional vomiting.  Vomited once on Monday, once on Tuesday, had nausea this morning but did not vomit.  He feels like he is eating normally.  Occasional constipation.  They have questions about his medications.  He was not provided with famotidine when he left TCU.  Not sure if he should still be taking that.  Hard to tell right now he is having GERD symptoms or not.  It is possible that his vomiting and nausea could be related to GERD.  He is also cystic losartan only if his blood pressure systolic BP is rater than 110.  That prescription runs out on Saturday.  They are not sure if he should keep taking that or not.    Last " A1c =9.4% on 11/3/2021.    Daughter Salena  #[phone]    Additionally, he has income problems.  He has not worked at all since his stroke.  His family has been trying to find ways that he could be eligible for supplemental income but have been unsuccessful.  He only has Social Security for income now.    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     Syncope, unspecified syncope type     History of seizure     Aphasia     History of embolic stroke     Acute encephalopathy     Left facial numbness     Left hand weakness     Atherosclerosis of left carotid artery     Acute CVA (cerebrovascular accident) (H)     Symptomatic carotid artery stenosis with infarction (H)     Seizures (H)     Status epilepticus (H)     Saddle pulmonary embolus (H)     Hyponatremia     Hemorrhage from tracheostomy stoma (H)     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 seizures (H)     Acute respiratory failure (H)     Acute renal insufficiency     Cerebral infarction (H)     Acute cystitis     Acute blood loss anemia     Hospital discharge follow-up       Current Outpatient Medications:      Continuous Blood Gluc Sensor (FREESTYLE MICHELLE 2 SENSOR) MISC, 1 each every 14 days 1 each every 14 days. Change every 14 days., Disp: 2 each, Rfl: 5     famotidine (PEPCID) 20 MG tablet, Take 20 mg by mouth 2 times daily, Disp: , Rfl:      hydrochlorothiazide (HYDRODIURIL) 50 MG tablet, Take 1.5 tablets (75 mg) by mouth daily, Disp: 135 tablet, Rfl: 3     ACCU-CHEK GUIDE test strip,  Use to test blood sugar 1 time daily., Disp: 50 strip, Rfl: 11     acetaminophen (TYLENOL) 325 MG tablet, Take 2 tablets (650 mg) by mouth every 4 hours as needed for other (For optimal non-opioid multimodal pain management to improve pain control.), Disp: , Rfl:      atorvastatin (LIPITOR) 80 MG tablet, Take 1 tablet (80 mg) by mouth daily, Disp: 90 tablet, Rfl: 3     blood glucose monitoring (SOFTCLIX) lancets, Use to test blood sugar 1 time daily., Disp: 100 each, Rfl: 4     Blood Glucose Monitoring Suppl (ACCU-CHEK GUIDE ME) w/Device KIT, 1 each daily, Disp: 1 kit, Rfl: 0     enoxaparin ANTICOAGULANT (LOVENOX) 80 MG/0.8ML syringe, Inject 80 mg Subcutaneous every 12 hours, Disp: , Rfl:      LANTUS SOLOSTAR 100 UNIT/ML soln, Inject 38 Units Subcutaneous 2 times daily, Disp: , Rfl:      levETIRAcetam (KEPPRA) 750 MG tablet, Take 1,500 mg by mouth 2 times daily, Disp: , Rfl:      losartan (COZAAR) 50 MG tablet, Take 1 tablet (50 mg) by mouth daily, Disp: 90 tablet, Rfl: 3     multivitamin w/minerals (THERA-VIT-M) tablet, Take 1 tablet by mouth daily, Disp: , Rfl:      polyethylene glycol-propylene glycol (SYSTANE ULTRA) 0.4-0.3 % SOLN ophthalmic solution, Place 1 drop into both eyes every hour as needed for dry eyes, Disp: , Rfl:      sertraline (ZOLOFT) 25 MG tablet, Take 25 mg by mouth daily, Disp: , Rfl:      TRULICITY 0.75 MG/0.5ML pen, Inject 0.75 mg Subcutaneous every 7 days Every Wednesday, Disp: , Rfl:      VIMPAT 200 MG TABS tablet, Take 200 mg by mouth 2 times daily, Disp: , Rfl:       Objective:   Allergies:  Novocain [procaine]    Vitals:  Vitals:    03/09/22 0918   BP: 110/70   BP Location: Left arm   Patient Position: Sitting   Cuff Size: Adult Regular   Pulse: 78   Resp: 12   Weight: 84.4 kg (186 lb)       Body mass index is 29.13 kg/m .    Vital signs reviewed.  General: Patient is alert and oriented x 3, in no apparent distress  Ears: TMs are non-erythematous with good light reflex  bilaterally  Throat: no erythema, edema or exudate noted  Lymphatic: no anterior cervical lymph node enlargement  Cardiac: regular rate and rhythm, no murmurs  Pulmonary: lungs clear to auscultation bilaterally, no crackles, rales, rhonchi, or wheezing noted        Assessment and Plan:   1. Hospital discharge follow-up  2. Acute CVA (cerebrovascular accident) (H)  3. Cerebral infarction, unspecified mechanism (H)  4. 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 seizures (H)  Hospitalized for stroke from November to January.  Discharged to TCU in January, had another readmit because of bleeding from his tracheostomy site.  He has been home from TCU since Monday, 3/7/2022.  He feels like things are going pretty well.  His daughter, who is with us today, is concerned that he lives alone.  I have concerns for safety at home, his ability to do his ADLs, and fall concerns.  Urgent home care referral made today.  Additionally he has not been able to work since he had a stroke and is only living on Social Security.  His daughter has tried to call many people to see if they can get him financial aid or apply for certain programs, but she has not been able to actually speak with anyone.  Referral made for care management to assess that and any other needs.  He is going to ENT tomorrow to check up on his tracheostomy site.  He has a visit with vascular surgery next week and then with neurosurgery next week as well.  Losartan runs out on Saturday.  Notes from nursing home states that he should not take losartan if his blood pressure is less than 110.  This I am not sure if he will be able to adhere to these directions consistently.  All of his and his daughter's questions were answered today.    One important concern is that he is unable to give himself Lovenox injections.  His hands shake too much.  Right now his children are coming over twice a day to give these injections to him.  We reviewed this.  We will have  "home nurse assess this.  Could consider switching to oral anticoagulant if appropriate, but would want to have specialist sign off on this.  - Home Care Referral  - Primary Care - Care Coordination Referral; Future    5. Type 2 diabetes mellitus with both eyes affected by moderate nonproliferative retinopathy and macular edema, without long-term current use of insulin (H)  A1c: 9.4% on 11/3/2021  Eye Exam: Currently up-to-date, due in April  Foot Exam: Up-to-date  Smoking: No  On a statin: Yes  Blood Pressure: Well controlled  Microalbumin: Overdue, will get at next visit  On ACE inhibitor: On ARB  Continue same medications for now.  Were not able to get labs done before he left today.  Certainly possible home nurse may be able to draw labs.  If that the case would want to check an A1c along with other screening labs in the near future.  Has not been checking his sugars because he does not have the supplies.  Because of his hand shaking, I am not sure that he would be able to accurately do this anyway.  Order put in for freestyle michelle meter.  Follow-up with home nurse/diabetes educator/pharmacist for follow-up on this.  Plan to have him follow-up with them in 2 to 4 weeks.  - Home Care Referral  - Primary Care - Care Coordination Referral; Future  - Continuous Blood Gluc  (FREESTYLE MICHELLE 2 READER) MICHAEL; 1 each once for 1 dose  Dispense: 1 each; Refill: 0  - Continuous Blood Gluc Sensor (FREESTYLE MICHELLE 2 SENSOR) MISC; 1 each every 14 days 1 each every 14 days. Change every 14 days.  Dispense: 2 each; Refill: 5    6. Unsteady gait  Lives alone.  States he has cleared anything from his floor that he might trip on.  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  We will have home nurse assess for safety at home and fall risk.  - Home Care Referral  - Primary Care - Care Coordination Referral; Future     52 minutes spent on the date of the " encounter doing chart review, history and exam, and documentation.      This dictation uses voice recognition software, which may contain typographical errors.

## 2022-03-10 NOTE — PROGRESS NOTES
Clinic Care Coordination Contact  Care Team Conversations    CCC received message from PCP requesting support for home care placement  RN CC outreach to home care agency which had worked with pt in past   They should have capacity to reach out to patient in 1-3 days    RN CC will request Care team to fax order to [facility] [phone]    RN CC will follow up in 1 week to review status

## 2022-03-11 NOTE — TELEPHONE ENCOUNTER
RN checking status of PCP's message: Home Care Service Referral    Was the order faxed?    ELIUD Hamilton, RN   [facility]

## 2022-03-11 NOTE — CONFIDENTIAL NOTE
The pt's insurance is not covering the Dexcom G6 Sensor and the Freestyle Constantino 2 [place] Device, please start a PA.

## 2022-03-11 NOTE — TELEPHONE ENCOUNTER
Moy Mcguire, [facility]        Layla was working this, her message below was asking someone at the clinic to please fax. She doesn't have remote faxing   Thank you!        RN printed out Home Care Referral order and faxed to listed fax number [phone].    Routing back to PCP as FRANCE CooperN, RN   [facility]

## 2022-03-13 PROBLEM — R53.1 WEAKNESS: Status: ACTIVE | Noted: 2022-01-01

## 2022-03-13 NOTE — ED TRIAGE NOTES
Patient arrives with family.  Reports no BM x 8 days.  Taking ducolax and miralax at home without relief- eating raisins and prunes.  C/o rectal discomfort- notes that the right side of his rectum is hard.  Patient also states emesis x 5 days.  Family mentions that patient was recently admitted for stroke- had feeding tube placed at that time which has been since removed and patient has been having intermittent issues with N/V since feeding tube removed.  Also noted patient to be weaker today.  Actively vomiting in triage.

## 2022-03-14 PROBLEM — I10 PRIMARY HYPERTENSION: Status: ACTIVE | Noted: 2020-07-16

## 2022-03-14 PROBLEM — R62.7 ADULT FAILURE TO THRIVE: Status: ACTIVE | Noted: 2022-01-01

## 2022-03-14 PROBLEM — N17.9 AKI (ACUTE KIDNEY INJURY) (H): Status: ACTIVE | Noted: 2022-01-01

## 2022-03-14 PROBLEM — E11.3313 TYPE 2 DIABETES MELLITUS WITH BOTH EYES AFFECTED BY MODERATE NONPROLIFERATIVE RETINOPATHY AND MACULAR EDEMA, WITHOUT LONG-TERM CURRENT USE OF INSULIN (H): Status: ACTIVE | Noted: 2020-12-04

## 2022-03-14 PROBLEM — I69.398 SEIZURE DISORDER AS SEQUELA OF CEREBROVASCULAR ACCIDENT (H): Status: ACTIVE | Noted: 2020-10-30

## 2022-03-14 PROBLEM — G40.909 SEIZURE DISORDER AS SEQUELA OF CEREBROVASCULAR ACCIDENT (H): Status: ACTIVE | Noted: 2020-10-30

## 2022-03-14 NOTE — H&P
[facility]    History and Physical - Hospitalist Service       Date of Admission:  3/13/2022    Assessment & Plan      Jorge Young is a 66 year old male admitted on 3/13/2022. He was brought to the emergency department for evaluation of intermittent nausea, vomiting, constipation, falls.    1.  Failure to thrive  Patient lives by himself, has been falling frequently over the last 4 days and has associated dehydration likely from decreased oral intake in the setting of intermittent nausea  Supportive management  Fall precautions  Social work consult for disposition  PT/OT evaluation and treatment  RD consult for malnutrition assessment and recommendations, GJ tube removed on 2/22/2022    2.  Acute kidney injury  Likely secondary to decreased oral intake  IV hydration overnight  Avoid nephrotoxins including NSAIDs  Hold PTA losartan and hydrochlorothiazide tonight  Check CK level to rule out rhabdomyolysis secondary to frequent falls    3.  Type 2 diabetes mellitus with long-term insulin  Reconcile PTA medications  Monitor with insulin sliding scale    4.  Essential hypertension  Reconcile PTA medications: Hold losartan and hydrochlorothiazide tonight as above  IV hydralazine as needed    5.  Seizure disorder  Check Keppra and Vimpat level, reconcile PTA doses    6.  Right elbow effusion, right buttock contusion  Intact CMS  Check elbow x-ray    7.  Constipation  CT abdomen and pelvis showed no acute findings  Patient reports a large bowel movement 1 day prior to arrival  Continue bowel regimen    8.  History of recurrent CVA  CT head shows subtle enhancement associated with the evolving infarct in the right occipital lobe, redemonstrated chronic right parietal lobe infarct.    9.  Anemia  Hemoglobin improved since 1/28/2022    10.  History of tracheostomy  Tracheostomy placed on 1/10/2022, revised on 1/21/2022 to control bleeding, unclear when it was removed     Diet: Combination Diet  Moderate Consistent Carb (60 g CHO per Meal) Diet; Low Saturated Fat Na <2400mg Diet    DVT Prophylaxis: Enoxaparin (Lovenox) SQ  Mora Catheter: Not present  Central Lines: None  Cardiac Monitoring: None  Code Status: Full Code      Clinically Significant Risk Factors Present on Admission            # Anion Gap Metabolic Acidosis: AG = 16 mmol/L (Ref range: 5 - 18 mmol/L) on admission, will monitor and treat as appropriate   # Coagulation Defect: home medication list includes an anticoagulant medication        Disposition Plan   Expected Discharge:  after at least 2 midnights  Anticipated discharge location:  Awaiting care coordination huddle  Delays:    NATALIA, PT/OT evaluation for disposition       The patient's care was discussed with the Patient.    Seb Crump MD  Hospitalist Service  [facility]  Securely message with the Vocera Web Console (learn more here)  Text page via [employer] Paging/Directory         ______________________________________________________________________    Chief Complaint   Nausea, vomiting, constipation, falls,    History is obtained from the patient, electronic health record and emergency department physician    History of Present Illness   Jorge Young is a 66 year old male who was brought to the emergency department from home for evaluation of constipation, intermittent nausea and vomiting.  Past medical history of recurrent strokes, seizures, hypertension, type 2 diabetes, hyperlipidemia.  Patient was admitted to this hospital for stroke symptoms on 11/3/2021 and had left carotid endarterectomy on 11/4/2021, readmitted at [facility] on 12/18/2021 with confusion and MRI head showed multiple small foci of acute/subacute ischemic injury involving the right can, right parietal lobe, right frontal lobe deep white matter.  Patient had a seizure-like activity on 12/19/2021.  Patient was intubated on 12/20/2021, extubated 12/31/2021 but reintubated the same  day due to stridors and respiratory distress.  On 1/3/2022 a CTA chest showed large burden bilateral acute pulmonary embolism including at the bifurcation of the main pulmonary artery, combination of pneumonia and pulmonary infarction with mild to moderate pleural effusions.  Patient was treated with IV heparin for PE and Unasyn for pneumonia and UTI.  He had a tracheostomy placed on 1/10/2022 and discharged to transitional care facility on 1/20/2022.  However, patient had bleeding from tracheostomy site and was readmitted on 1/21/2022 for revision to control bleeding.  Patient had mild recurrent bleed that resolved after silver nitrate catheterization along the left lateral aspect of stoma on 1/25/2022.  He was discharged back to TCU on 1/26/2022 on Lovenox for anticoagulation.  It is not clear when he was discharged from TCU but he states had been eating without the need for GJ tube.  Patient had GJ tube removed on 2/22/2022.  It is unclear when the tracheostomy was removed.    Over the last 5 days patient has had intermittent nausea and vomiting.  He denies the food getting stuck or painful swallowing.  However, he noted some constipation despite taking his stool softeners but ultimately had a large bowel movement 1 day prior to arrival.  Over the last 4 days, he has noted some off balance and had a few falls on his buttocks.  He denies head trauma or loss of consciousness.  He is currently living by himself and was brought to the ED for further evaluation.  He denies fevers, chills, chest pain, shortness of breath or palpitations.    Review of Systems    The 10 point Review of Systems is negative other than noted in the HPI or here.     Past Medical History    I have reviewed this patient's medical history and updated it with pertinent information if needed.   Past Medical History:   Diagnosis Date     Benign essential hypertension      COVID-19 virus infection      Diabetes mellitus (H)      Hyperlipidemia LDL  goal <100      Left facial numbness 2020     Left hand weakness 2020     Seizures (H)      Stroke (H)        Past Surgical History   I have reviewed this patient's surgical history and updated it with pertinent information if needed.  Past Surgical History:   Procedure Laterality Date     APPENDECTOMY OPEN       ENDARTERECTOMY CAROTID Left 2021    Procedure: ENDARTERECTOMY, CAROTID; left with electroencephalogram and intraoperative ultrasound;  Surgeon: Xavi Frazier MD;  Location: [facility] Main OR       Social History   I have reviewed this patient's social history and updated it with pertinent information if needed.  Social History     Tobacco Use     Smoking status: Never Smoker     Smokeless tobacco: Never Used   Substance Use Topics     Alcohol use: None     Drug use: None       Family History     No significant family history, including no history of: Stroke    Prior to Admission Medications   Prior to Admission Medications   Prescriptions Last Dose Informant Patient Reported? Taking?   ACCU-CHEK GUIDE test strip   No No   Sig: Use to test blood sugar 1 time daily.   Blood Glucose Monitoring Suppl (ACCU-CHEK GUIDE ME) w/Device KIT   No No   Si each daily   Continuous Blood Gluc Sensor (FREESTYLE MICHELLE 2 SENSOR) [facility]   No No   Si each every 14 days 1 each every 14 days. Change every 14 days.   LANTUS SOLOSTAR 100 UNIT/ML soln   Yes No   Sig: Inject 38 Units Subcutaneous 2 times daily   TRULICITY 0.75 MG/0.5ML pen   Yes No   Sig: Inject 0.75 mg Subcutaneous every 7 days Every Wednesday   VIMPAT 200 MG TABS tablet   Yes No   Sig: Take 200 mg by mouth 2 times daily   acetaminophen (TYLENOL) 325 MG tablet   No No   Sig: Take 2 tablets (650 mg) by mouth every 4 hours as needed for other (For optimal non-opioid multimodal pain management to improve pain control.)   atorvastatin (LIPITOR) 80 MG tablet   No No   Sig: Take 1 tablet (80 mg) by mouth daily   blood glucose monitoring  (SOFTCLIX) lancets   No No   Sig: Use to test blood sugar 1 time daily.   enoxaparin ANTICOAGULANT (LOVENOX) 80 MG/0.8ML syringe   Yes No   Sig: Inject 80 mg Subcutaneous every 12 hours   famotidine (PEPCID) 20 MG tablet   Yes No   Sig: Take 20 mg by mouth 2 times daily   hydrochlorothiazide (HYDRODIURIL) 50 MG tablet   No No   Sig: Take 1.5 tablets (75 mg) by mouth daily   levETIRAcetam (KEPPRA) 750 MG tablet   Yes No   Sig: Take 1,500 mg by mouth 2 times daily   losartan (COZAAR) 50 MG tablet   No No   Sig: Take 1 tablet (50 mg) by mouth daily   multivitamin w/minerals (THERA-VIT-M) tablet   Yes No   Sig: Take 1 tablet by mouth daily   polyethylene glycol-propylene glycol (SYSTANE ULTRA) 0.4-0.3 % SOLN ophthalmic solution   Yes No   Sig: Place 1 drop into both eyes every hour as needed for dry eyes   sertraline (ZOLOFT) 25 MG tablet   Yes No   Sig: Take 25 mg by mouth daily      Facility-Administered Medications: None     Allergies   Allergies   Allergen Reactions     Novocain [Procaine] Other (See Comments)     Tiredness         Physical Exam   Vital Signs: Temp: (!) 96.7  F (35.9  C) Temp src: Temporal BP: (!) 150/79 Pulse: 73   Resp: 19 SpO2: 99 % O2 Device: None (Room air)    Weight: 186 lbs 0 oz    Constitutional: awake and alert  Eyes: extra-ocular muscles intact and sclera clear  Respiratory: no increased work of breathing, good air exchange and clear to auscultation  Cardiovascular: regular rate and rhythm and normal S1 and S2  GI: normal bowel sounds, soft and non-tender  Skin: Small bruise right elbow  Musculoskeletal: Contusion/effusion of right elbow, 10 x 15 cm contusion over the right buttock.  Neurologic: Mental Status Exam:  Level of Alertness:   awake  Orientation:   person, place, time  Motor Exam:  moves all extremities well and symmetrically  Neuropsychiatric: General: normal, calm and normal eye contact    Data   Data reviewed today: I reviewed all medications, new labs and imaging results  over the last 24 hours. I personally reviewed the EKG tracing showing Sinus rhythm at 89 bpm, nonspecific T waves V3-6 are similar to EKG from November 2021.    Recent Labs   Lab 03/13/22 2022 03/13/22  1812   WBC  --  13.3*   HGB  --  12.7*   MCV  --  86   PLT  --  308   NA  --  136   POTASSIUM  --  3.7   CHLORIDE  --  93*   CO2  --  27   BUN  --  30*   CR  --  1.52*   ANIONGAP  --  16   GLENN  --  9.6   * 182*   ALBUMIN  --  4.0   PROTTOTAL  --  8.6*   BILITOTAL  --  0.5   ALKPHOS  --  108   ALT  --  24   AST  --  16     Recent Results (from the past 24 hour(s))   CT Abdomen Pelvis w Contrast    Narrative    EXAM: CT ABDOMEN PELVIS W CONTRAST  LOCATION: [facility]  DATE/TIME: 3/13/2022 8:06 PM    INDICATION: Bowel obstruction suspected.  COMPARISON: 01/03/2022.  TECHNIQUE: CT scan of the abdomen and pelvis was performed following injection of IV contrast. Multiplanar reformats were obtained. Dose reduction techniques were used.  CONTRAST: Isovue 370 75 mL.    FINDINGS:   LOWER CHEST: There is some mild bibasilar atelectasis with no central airway obstruction. Mild vascular calcification.    HEPATOBILIARY: There is a minute simple appearing cyst seen in the left lobe of liver with no follow-up recommended. Liver, gallbladder, and bile ducts are otherwise unremarkable.    PANCREAS: Normal.    SPLEEN: Normal.    ADRENAL GLANDS: There is a 1.9 cm x 1.5 cm well-circumscribed low density lesion seen in the left adrenal gland with average Hounsfield units at -5, this should be benign and no follow-up is needed. Right adrenal gland is normal.    KIDNEYS/BLADDER: Normal.    BOWEL: Normal.    LYMPH NODES: Normal.    VASCULATURE: Mild to moderate scattered calcifications with no aneurysmal dilatation.    PELVIC ORGANS: Normal.    MUSCULOSKELETAL: Degenerative disc disease at L4 and L5.      Impression    IMPRESSION:   1.  No acute findings seen to explain patient's pain clinically. Given  Hounsfield units of left adrenal gland lesion, this should be benign and no follow-up is needed. No significant changes since 01/03/2022.   Head CT w/o contrast    Narrative    EXAM: CT HEAD W/O CONTRAST  LOCATION: [facility]  DATE/TIME: 3/13/2022 10:57 PM    INDICATION: Fall. Anticoagulated. Evaluate for subdural hemorrhage.  COMPARISON: 01/24/2021.  TECHNIQUE: Routine CT Head without IV contrast. Multiplanar reformats. Dose reduction techniques were used.    FINDINGS: Residual intravascular contrast limits evaluation for subtle hemorrhage.    INTRACRANIAL CONTENTS: No intracranial hemorrhage, extraaxial collection, or mass effect. Redemonstrated infarcts in the right occipital and parietal lobes. There appears to be subtle contrast-enhancement of the evolving infarct in the right occipital   lobe. No new gray-white differentiation loss. Moderate presumed chronic small vessel ischemic changes. Mild generalized volume loss. No hydrocephalus.     VISUALIZED ORBITS/SINUSES/MASTOIDS: No intraorbital abnormality. Trace mucosal thickening of the maxillary sinuses. No middle ear or mastoid effusion.    BONES/SOFT TISSUES: No acute abnormality.      Impression    IMPRESSION:  1.  No definite acute intracranial hemorrhage.  2.  Subtle enhancement associated with the evolving infarct in the right occipital lobe. Redemonstrated chronic right parietal lobe infarct.   XR Elbow Right 2 Views    Narrative    EXAM: XR ELBOW RIGHT 2 VIEWS  LOCATION: [facility]  DATE/TIME: 3/14/2022 12:40 AM    INDICATION: Right elbow pain.  COMPARISON: None.      Impression    IMPRESSION: No fracture, dislocation or significant joint effusion. Soft tissue thickening and edema along the dorsal aspect of the elbow suggests olecranon bursitis and cellulitis. Traumatic contusion in the differential diagnosis.

## 2022-03-14 NOTE — PROGRESS NOTES
"Speech-Language Pathology: Clinical Swallow Evaluation     03/14/22 1500   General Information   Onset of Illness/Injury or Date of Surgery 03/14/22   Referring Physician Kalyani   Pertinent History of Current Problem Per ordering MD \"Jorge Young is a 66 year old male admitted on 3/13/2022. He has a complex hx of recent complex hospital stay at [facility]. On  11/3/2021 and had left carotid endarterectomy then on 12/18/21 readmitted at [facility] on with confusion and MRI head showed multiple small foci of acute/subacute ischemic injury involving the right can, right parietal lobe, right frontal lobe deep white matter.  Patient had a seizure-like activity on 12/19/2021.  Patient was intubated on 12/20/2021, extubated 12/31/2021 but re intubated the same day due to stridors and respiratory distress.  On 1/3/2022 a CTA chest showed large burden bilateral acute pulmonary embolism including at the bifurcation of the main pulmonary artery, combination of pneumonia and pulmonary infarction with mild to moderate pleural effusions.  Patient was treated with IV heparin for PE and Unasyn for pneumonia and UTI.  He had a tracheostomy placed on 1/10/2022 and discharged to transitional care facility on 1/20/2022.  However, patient had bleeding from tracheostomy site and was readmitted on 1/21/2022 for revision to control bleeding.  Patient had mild recurrent bleed that resolved after silver nitrate catheterization along the left lateral aspect of stoma on 1/25/2022.  He was discharged back to TCU on 1/26/2022 on Lovenox for anticoagulation. His feeding tube was removed by 2/22/22 and was on regular diet since.\"   General Observations Patient reports participating in video swallow study prior to diet initiation. He reports this was \"normal\" and that he never had difficulty with swallowing. He reported that he did continue to work with a Speech Therapist for communication/speech. He feels he is currently at his " baseline.   Past History of Dysphagia Hx is unclear at this time. Pt was on TF during previous hospital admission. He reported that he was able to resume a regular/thin diet following a video swallow study. Records currently unavailable. RN reports no s/s aspiration with any intake today.   Type of Evaluation   Type of Evaluation Swallow Evaluation   Oral Motor   Oral Musculature generally intact   Structural Abnormalities none present   Dentition (Oral Motor)   Dentition (Oral Motor) adequate dentition   Facial Symmetry (Oral Motor)   Facial Symmetry (Oral Motor) WNL   Lip Function (Oral Motor)   Lip Range of Motion (Oral Motor) WNL   Tongue Function (Oral Motor)   Tongue ROM (Oral Motor) WNL   Cough/Swallow/Gag Reflex (Oral Motor)   Soft Palate/Velum (Oral Motor) WNL   Volitional Swallow (Oral Motor) WNL   Vocal Quality/Secretion Management (Oral Motor)   Vocal Quality (Oral Motor) WNL   Secretion Management (Oral Motor) WNL   General Swallowing Observations   Current Diet/Method of Nutritional Intake (General Swallowing Observations, NIS) thin liquids (level 0);regular diet   Respiratory Support (General Swallowing Observations) none   Swallowing Evaluation Clinical swallow evaluation   Clinical Swallow Evaluation   Feeding Assistance no assistance needed   Clinical Swallow Evaluation Textures Trialed thin liquids;solid foods   Clinical Swallow Eval: Thin Liquid Texture Trial   Mode of Presentation, Thin Liquids cup;straw;self-fed   Volume of Liquid or Food Presented 8oz   Oral Phase of Swallow WFL   Pharyngeal Phase of Swallow intact   Clinical Swallow Evaluation: Solid Food Texture Trial   Mode of Presentation self-fed   Volume Presented x6 bites   Oral Phase WFL   Pharyngeal Phase intact   Esophageal Phase of Swallow   Patient reports or presents with symptoms of esophageal dysphagia No   Swallowing Recommendations   Diet Consistency Recommendations thin liquids (level 0);regular diet   Supervision Level for  Intake patient independent   Mode of Delivery Recommendations slow rate of intake   Recommended Feeding/Eating Techniques (Swallow Eval) maintain upright sitting position for eating   Medication Administration Recommendations, Swallowing (SLP) Via thin or puree, pt preference   Comment, Swallowing Recommendations Bedside Swallow Study completed. Patient had no s/s aspiration with any texture. Oral motor function was WNL. Mastication was WNL. Hyolaryngeal elevation appears present upon visualization and palpation. Recommend diet of regular and thin with standard safe swallow strategies.   Clinical Impression   Criteria for Skilled Therapeutic Interventions Met (SLP Eval) No problems identified which require skilled intervention;Current level of function same as previous level of function   Clinical Impression Comments No s/s aspiration with any intake. Patient's speech was clear, answered questions appropriately, and followed verbal commands. Denies change in current speech or language. Pt was noted to be emotionally labile.   SLP Discharge Planning   SLP Discharge Recommendation   (Per PT/OT)   SLP Rationale for DC Rec No anticipated ST at d/c   SLP Brief overview of current status  Pt is okay to continue current diet of regular textures and thin liquids.    Total Evaluation Time   Total Evaluation Time (Minutes) 20   SLP Goals   Therapy Frequency (SLP Eval)   (Eval only)

## 2022-03-14 NOTE — PROGRESS NOTES
Occupational Therapy      03/14/22 0730   Quick Adds   Type of Visit Initial Occupational Therapy Evaluation   Living Environment   People in Home alone   Current Living Arrangements apartment   Home Accessibility stairs to enter home   Number of Stairs, Main Entrance 4   Stair Railings, Main Entrance railings safe and in good condition;railing on left side (ascending)   Transportation Anticipated family or friend will provide   Living Environment Comments W/I shower no GB/shower chair but willing to get a chair    Self-Care   Usual Activity Tolerance good   Current Activity Tolerance moderate   Regular Exercise No   Equipment Currently Used at Home none   Fall history within last six months yes   Number of times patient has fallen within last six months 5   Activity/Exercise/Self-Care Comment Ind. w/ all self care tasks, Daughter sets up medications/finances- son & daughter take pt grocery shopping   Instrumental Activities of Daily Living (IADL)   Previous Responsibilities meal prep;housekeeping   General Information   Onset of Illness/Injury or Date of Surgery 03/13/22   Referring Physician Vikas Beckford, DO    Patient/Family Therapy Goal Statement (OT) to get stronger    Additional Occupational Profile Info/Pertinent History of Current Problem Pt initally presenting to [facility] on 11/3 for stroke like symptoms pt found to have L. carotid endarterectomy, 12/18 presented back w/ confusion small foci acute/subacute ischemic injury pt intubated 12/20-12/31 on 1/3 pt found to have bilateral pulm embo. 1/10 trach pt d/c TCU on 1/20. Unclear on pt d/c date/when trach was removed. GJ tube removal on 2/22 Pt resenting on 3/13 to ED w/ R.side arm weakness. CT subtle enhanced associatd w/ evolving infarct in R. occipital lobe redemonstrated chronic R. parietal lobe infarct, pt failure to thrive. PMHx NATALIA, Type II DM, HTN, Seizure disorder, constipation, hx. of CVA, pt has R. elbow and R. buttocks contusions.   Existing  Precautions/Restrictions fall;seizures   Cognitive Status Examination   Orientation Status orientation to person, place and time   Range of Motion Comprehensive   General Range of Motion no range of motion deficits identified   Bed Mobility   Bed Mobility supine-sit;sit-supine   Supine-Sit [place] (Bed Mobility) minimum assist (75% patient effort);moderate assist (50% patient effort)   Sit-Supine [place] (Bed Mobility) minimum assist (75% patient effort)   Assistive Device (Bed Mobility) bed rails   Transfers   Transfers sit-stand transfer   Sit-Stand Transfer   Sit-Stand [place] (Transfers) contact guard;supervision;verbal cues   Activities of Daily Living   BADL Assessment/Intervention lower body dressing   Lower Body Dressing Assessment/Training   [place] Level (Lower Body Dressing) moderate assist (50% patient effort);supervision;verbal cues   Position (Lower Body Dressing) unsupported sitting   Clinical Impression   Criteria for Skilled Therapeutic Interventions Met (OT) Yes, treatment indicated   OT Diagnosis Decreased ADL independence    OT Problem List-Impairments impacting ADL problems related to;activity tolerance impaired;balance;mobility;strength   Assessment of Occupational Performance 3-5 Performance Deficits   Identified Performance Deficits LB dressing, trnf/balance, endurance/act. tolerance, safety awareness   Planned Therapy Interventions (OT) ADL retraining;IADL retraining;balance training;strengthening;transfer training   Clinical Decision Making Complexity (OT) moderate complexity   Anticipated Equipment Needs Upon Discharge (OT) shower chair   Risk & Benefits of therapy have been explained evaluation/treatment results reviewed;patient   OT Discharge Planning   OT Discharge Recommendation (DC Rec) Transitional Care Facility;home with assist   OT Rationale for DC Rec Assistx1 trnfs/self cares unable to assess mobility d/t increased light headedness/dizziness. OT recommending  24 hour supervision/assist at this time to ensure safety. Pt lives alone- if d/c home OT recommending assist w/ bathing/toileting/dressing/trnfs.    Therapy Certification   Start of Care Date 03/13/22   Certification date from 03/13/22   Certification date to 03/21/22   Medical Diagnosis Adult failure to thrive    Total Evaluation Time (Minutes)   Total Evaluation Time (Minutes) 4   OT Goals   Therapy Frequency (OT) Daily   OT Predicated Duration/Target Date for Goal Attainment 03/17/22   OT Goals Hygiene/Grooming;Lower Body Dressing;Bed Mobility;Transfers;Toilet Transfer/Toileting;Cognition   OT: Hygiene/Grooming modified independent;while standing   OT: Lower Body Dressing Modified independent;using adaptive equipment   OT: Bed Mobility Modified independent   OT: Transfer Modified independent;with assistive device   OT: Toilet Transfer/Toileting Modified independent;using adaptive equipment   OT: Cognitive Patient/caregiver will verbalize understanding of cognitive assessment results/recommendations as needed for safe discharge planning

## 2022-03-14 NOTE — ED NOTES
"[facility] ED Handoff Report    ED Chief Complaint: constipation, FTT    ED Diagnosis:  (M62.81) Generalized muscle weakness  Comment:   Plan:     (Z87.898) Hx of vomiting  Comment:   Plan:     (S30.0XXA) Traumatic hematoma of buttock, initial encounter  Comment:   Plan:        PMH:    Past Medical History:   Diagnosis Date     Benign essential hypertension      COVID-19 virus infection      Diabetes mellitus (H)      Hyperlipidemia LDL goal <100      Left facial numbness 07/16/2020     Left hand weakness 07/16/2020     Seizures (H)      Stroke (H)         Code Status:  Full Code     Falls Risk: Yes Band: Applied    Current Living Situation/Residence: lives with their son or daughter and complicated - SW consult      Elimination Status: Continent: Yes     Activity Level: SBA    Patients Preferred Language:  English     Needed: No    Vital Signs:  /62   Pulse 69   Temp 98.2  F (36.8  C) (Oral)   Resp 18   Wt 84.4 kg (186 lb)   SpO2 93%   BMI 29.13 kg/m       Cardiac Rhythm: NA    Pain Score: 0/10    Is the Patient Confused:  No    Last Food or Drink: 03/14/22 at lunch    Focused Assessment:  Patient is alert and oriented, BIB for FTT and states no BM for 8 days, has been given multiple constipation medications without relief. Patient's living situation is \"compliacted\" per SW - there is consult     Tests Performed: Done: Labs and Imaging    Treatments Provided:  IV meds, fluids    Family Dynamics/Concerns: Yes; please explain: SW consult    Family Updated On Visitor Policy: Yes    Plan of Care Communicated to Family: Yes    Who Was Updated about Plan of Care: daughter    Belongings Checklist Done and Signed by Patient: Yes    Medications sent with patient: NA    Covid: asymptomatic , negative    Additional Information: NS running at 100 ml/hr. bruising to R buttock    RN: Mónica Gibson   3/14/2022 2:38 PM       "

## 2022-03-14 NOTE — PHARMACY-ADMISSION MEDICATION HISTORY
Pharmacy Note - Admission Medication History    Pertinent Provider Information:  Patient's daughter, Bailey, sets up his meds for him. Pt does take his insulin independently. ______________________________________________________________________    Prior To Admission (PTA) med list completed and updated in EMR.       PTA Med List   Medication Sig Note Last Dose     acetaminophen (TYLENOL) 325 MG tablet Take 2 tablets (650 mg) by mouth every 4 hours as needed for other (For optimal non-opioid multimodal pain management to improve pain control.)  Unknown at prn     aspirin 81 MG EC tablet Take 81 mg by mouth daily 3/14/2022: Pt takes on his own, daughter does not set up with other meds. 3/13/2022 at AM     atorvastatin (LIPITOR) 80 MG tablet Take 1 tablet (80 mg) by mouth daily  3/12/2022 at PM     enoxaparin ANTICOAGULANT (LOVENOX) 80 MG/0.8ML syringe Inject 80 mg Subcutaneous every 12 hours  3/13/2022 at AM     famotidine (PEPCID) 20 MG tablet Take 20 mg by mouth 2 times daily as needed   Unknown at prn     hydrochlorothiazide (HYDRODIURIL) 50 MG tablet Take 1.5 tablets (75 mg) by mouth daily  3/13/2022 at AM     LANTUS SOLOSTAR 100 UNIT/ML soln Inject 38 Units Subcutaneous 2 times daily  3/13/2022 at AM     levETIRAcetam (KEPPRA) 750 MG tablet Take 1,500 mg by mouth 2 times daily  3/13/2022 at AM     losartan (COZAAR) 50 MG tablet Take 1 tablet (50 mg) by mouth daily (Patient taking differently: Take 100 mg by mouth daily )  3/13/2022 at AM     polyethylene glycol (MIRALAX) 17 g packet Take 1 packet by mouth 2 times daily  3/13/2022 at AM     polyethylene glycol-propylene glycol (SYSTANE ULTRA) 0.4-0.3 % SOLN ophthalmic solution Place 1 drop into both eyes every hour as needed for dry eyes  Unknown at prn     sertraline (ZOLOFT) 25 MG tablet Take 25 mg by mouth daily  3/12/2022 at PM     TRULICITY 0.75 MG/0.5ML pen Inject 0.75 mg Subcutaneous every 7 days Every Wednesday 3/14/2022: Missed dose on 3/9/22 3/2/2022      VIMPAT 200 MG TABS tablet Take 200 mg by mouth 2 times daily  3/13/2022 at AM       Information source(s): Patient, Family member and CareEverywhere/SureScripts  Method of interview communication: in-person and phone    Summary of Changes to PTA Med List  New: aspirin, Miralax  Discontinued: multivitamin  Changed: losartan dose, famotidine to PRN    Patient was asked about OTC/herbal products specifically.  PTA med list reflects this.    In the past week, patient estimated taking medication this percent of the time:  greater than 90%.    Allergies were reviewed, assessed, and updated with the patient.      Patient does not anticipate needing any multi-use medications during admission.    The information provided in this note is only as accurate as the sources available at the time of the update(s).    Thank you,  Michelle Fagan, [facility]  3/14/2022 7:46 AM

## 2022-03-14 NOTE — CONSULTS
ORTHOPEDIC CONSULTATION    Consultation  Jorge Mcfadden,  1955, MRN [id number]    Weakness [R53.1]    PCP: Ashley Narvaez, [phone]   Code status:  Full Code       Extended Emergency Contact Information  Primary Emergency Contact: Faviola Gambino  Mobile Phone: [phone]  Relation: Daughter  Secondary Emergency Contact: AZAM MCFADDEN  Mobile Phone: [phone]  Relation: Other         IMPRESSION:  Right elbow pain, suspect traumatic olecranon bursitis.     PLAN:  This patient was discussed with Dr. Hoover, on-call surgeon for [facility] and they are in agreement with the following plan. The patient presented to the [facility] ED reporting pain and swelling in the left elbow subsequent to a fall 3 days prior. XR negative for fracture or dislocation. There is TTP with edema and slight erythema overlying the right olecranon bursa but there is no significant increase in warmth or decrease in elbow ROM. Symptoms seem consistent with traumatic olecranon bursitis of the left elbow.    Recommendation at this time include conservative treatment. Advised continued mobility and WBAT of the RUE. He may utilize heat, ice, NSAIDS, elevation, and compression (tubigrip sleeve or ACE) to alleviate his pain and discomfort. Would recommend outpatient follow up in approximately 1 week for repeat evaluation. Ortho will sign off at this time but please reach out with any other questions or concerns.     Thank you for including [facility] in the care of Jorge Mcfadden. It has been a pleasure participating in his care.    CHIEF COMPLAINT: Adult failure to thrive    HISTORY OF PRESENT ILLNESS:  The patient is seen in orthopedic consultation at the request of Dr. Auguste.  The patient is a 66 year old male with mild pain and discomfort of the left  elbow. The patient reports that 3 days ago he sustained a fall and landed on the left elbow.  The patient describes their pain as dull and achey. They  report that their pain does not radiate. The patient reports that their pain is alleviated by rest and medication and is exacerbated by movement.    ALLERGIES:   Review of patient's allergies indicates   Allergies   Allergen Reactions     Novocain [Procaine] Other (See Comments)     Tiredness           MEDICATIONS UPON ADMISSION:  Medications were reviewed.  They include:   (Not in a hospital admission)        SOCIAL HISTORY:   he  reports that he has never smoked. He has never used smokeless tobacco.      FAMILY HISTORY:  family history is not on file.      REVIEW OF SYSTEMS:   Reviewed with patient. See HPI, otherwise negative       PHYSICAL EXAMINATION:  Vitals: Temp:  [96.7  F (35.9  C)-99.4  F (37.4  C)] 98.2  F (36.8  C)  Pulse:  [69-96] 81  Resp:  [18-19] 18  BP: (109-158)/() 128/60  SpO2:  [90 %-100 %] 98 %  General: On examination, the patient is resting comfortably, NAD, awake and alert and oriented to person, place, time, and and general circumstances   SKIN: There is no evidence of skin ulceration. Slight abrasion over the right olecranon. Edema and slight erythema over the olecranon bursa  Pulses:  radial pulse is intact and equal bilaterally  Sensation: intact and equal bilaterally to the distal upper extremities.  Tenderness: Mild TTP over the right olecranon bursa  ROM: Full ROM in all planes with minimal discomfort  Motor: 5/5 strength in all planes with minimal discomfort.   Contralateral side= Full range of motion, Negative joint instability findings, 5/5 motor groups about the joint, Non-tender.       RADIOGRAPHIC EVALUATION:  Personally reviewed    EXAM: XR ELBOW RIGHT 2 VIEWS  LOCATION: [facility]  DATE/TIME: 3/14/2022 12:40 AM     INDICATION: Right elbow pain.  COMPARISON: None.                                                                      IMPRESSION: No fracture, dislocation or significant joint effusion. Soft tissue thickening and edema along the dorsal  aspect of the elbow suggests olecranon bursitis and cellulitis. Traumatic contusion in the differential diagnosis.    PERTINENT LABS:  Lab Results: personally reviewed.   Lab Results   Component Value Date     03/14/2022     06/28/2021    CO2 28 03/14/2022    CO2 23 06/28/2021    BUN 23 03/14/2022    BUN 13 06/28/2021     Lab Results   Component Value Date    WBC 11.2 03/14/2022    WBC 9.1 06/28/2021    HGB 10.7 03/14/2022    HGB 15.2 06/28/2021    HCT 33.2 03/14/2022    HCT 46.6 06/28/2021    MCV 87 03/14/2022    MCV 84 06/28/2021     03/14/2022     06/28/2021       Fabian Gotti PA-C, [facility]  Date: 3/14/2022  Time: 12:46 PM    CC1:   Seb Crump MD    CC2:   Ashley Narvaez

## 2022-03-14 NOTE — CONSULTS
This is a neurological consultation      66-year-old admitted to hospital 3/13/2022    Chief complaint  Generalized weakness  Vomiting  Traumatic hematoma right buttocks      HPI  66-year-old with complex vascular medical issues listed in the chart.    Has a history of recurrent CVAs involving right occipital/right parietal hemisphere.  Was hospitalized back in November 3, 2021 had a left carotid endarterectomy 11/4/2021  Readmitted to [facility] 12/18/2021 with confusion MRI scan showed multiple acute/subacute strokes  Right can/right parietal/right frontal  Patient developed seizure activity 12/19/2021 required intubation extubated 12/31/2021  Reintubated due to respiratory distress  1/3/2022 had large burden bilateral acute pulmonary emboli  Difficulty with his tracheostomy which did need to be replaced    Patient's GJ tube removed 2/22/2022    Over the last 5 days prior to admission patient had increasing nausea and vomiting  At difficulty with food getting stuck painful swallowing  Has had difficulty with constipation    Also over the last 4 days has noted increased difficulty with balance  Fell causing a traumatic right buttocks hematoma  Denied losing consciousness with the fall      Per chart patient discharged to home about a week ago from the TCU  Admitted with intermittent nausea vomiting constipation falls dehydration hurt his right elbow and has a hematoma in the right buttocks      Past medical history  CVA   Seizure disorder  Left carotid endarterectomy November 2021  Diabetes  Hypertension  Hyperlipidemia  Pulmonary embolus  Macular degeneration  COVID-19 infection      Habits  Non-smoker  Does not drink alcohol    Family history  Mother with stroke      Work-up  CT scan head 3/13/2022  1.  No definite acute intracranial hemorrhage.  2.  Subtle enhancement associated with the evolving infarct in the right occipital lobe. Redemonstrated chronic right parietal lobe infarct.  Keppra level 52.4  (3/14/2022      Labs  Sodium 137 test 3.6  BUN 23 creatinine 1.05  AST 21/ALT 19  Glucose 10 9-91  White blood count 11.2, hemoglobin 10.7, platelets 217 6000        Exam  Blood pressure 136/62, pulse 69, temperature 98.2  Blood pressure range 109//107  Pulse range 61-96  T-max 99.4      Review of system  See HPI above    No headache no chest pain no trouble breathing right now  No diplopia dysarthria dysphagia  Feels that his vision is intact  Does have some right buttocks pain  Okay while he is laying down    No new weakness    Otherwise review of systems negative    General exam per primary MD  HEENT no signs of trauma lungs clear  Heart rate regular  Abdomen soft  Does have the right buttock swelling noted by other physicians  Has some right elbow swelling      Neurologic exam  Alert attentive  Normal prosody speech  Normal naming  Normal comprehension  Normal repetition  No aphasia  No neglect  Memory recall okay at bedside testing    Cranial nerves II through XII  No ophthalmoplegia  No nystagmus  Tongue twisters good  No visual field cut could be picked up on bedside testing  Face symmetrical    Upper extremities  Mild weakness left upper extremity compared to right  No actual drift and rapid alternating movements are fairly good    Lower extremities  Mild left leg weakness compared to right  Gets it off the bed and wiggles the toes fairly well even on the left side    Gait  Deferred  Does have the right buttocks pain          Assessment/plan      1.  Admitted with falls nausea vomiting constipation dehydration NATALIA      2.  Right pontine/right parietal occipital stroke November 2021       left ICA endarterectomy November 2021       Complex hospitalization with large PE prolonged intubation cared for at [facility]    3.   Seizures secondary to above December 2021        Keppra level high probably secondary to kidney function being off        appears from outside records needed to antiepileptics to  control his seizures         we will watch level and may trend down if not then we can decide if we adjust doses         Keppra level 52.4 not excessively elevated          Continue current antiepileptic medications      4.    Past risk factors hypertension diabetes hyperlipidemia    5.    Right elbow traumatic olecranon bursitis seen by orthopedic    Neurologic diagnosis  Right occipital stroke November 2021  Right pontine stroke November 2021  Status post left carotid endarterectomy November 2021  Epilepsy secondary to above requiring 2 antiepileptics to control      Home Meds  Atorvastatin 80 mg/day  Aspirin 81 mg/day  Lovenox    Continue  Keppra 750 mg tablet, 2 tabs twice daily  Vimpat 200 mg tablet, 1 tab twice daily    We will let the Keppra level run a bit high may have popped up partially from his dehydration  Seems wide awake and alert not overmedicated      Discussed with patient and family members at bedside face-to-face  Discussed with primary MD face-to-face      70 minutes total care time today greater than 50% face-to-face patient care team discussing and evaluating the above.

## 2022-03-14 NOTE — ED NOTES
Patient ordered omelette with ham, onion, pepper and cheese, breakfast potatoes and apple juice for breakfast.

## 2022-03-14 NOTE — CONSULTS
Care Management Initial Consult    General Information  Assessment completed with: Patient, Children, dtr-Bailey  Type of CM/SW Visit: Initial Assessment    Primary Care Provider verified and updated as needed: Yes   Readmission within the last 30 days:           Advance Care Planning:     (provided HCD forms)       Communication Assessment  Patient's communication style: spoken language (English or Bilingual)             Cognitive  Cognitive/Neuro/Behavioral: WDL                      Living Environment:   People in home: alone     Current living Arrangements: apartment      Able to return to prior arrangements: yes       Family/Social Support:  Care provided by: self  Provides care for: no one  Marital Status:   Children          Description of Support System: Supportive         Current Resources:   Patient receiving home care services: No     Community Resources: None  Equipment currently used at home: none  Supplies currently used at home:      Employment/Financial:  Employment Status: unemployed        Financial Concerns: insurance inadequate           Lifestyle & Psychosocial Needs:  Social Determinants of Health     Tobacco Use: Low Risk      Smoking Tobacco Use: Never Smoker     Smokeless Tobacco Use: Never Used   Alcohol Use: Not on file   Financial Resource Strain: Not on file   Food Insecurity: Not on file   Transportation Needs: Not on file   Physical Activity: Not on file   Stress: Not on file   Social Connections: Not on file   Intimate Partner Violence: Not on file   Depression: Not at risk     PHQ-2 Score: 0   Housing Stability: Not on file       Functional Status:  Prior to admission patient needed assistance:   Dependent ADLs:: Independent  Dependent IADLs:: Transportation                Additional Information:  Assessment completed with patient and with patient's verbal permission spoke with his daughter Bailey over the phone. Patient has had a difficult year. He was at [facility] in November  and December for complex medical issues. He discharged to [facility] in January but sounds like he no longer had TCU coverage so discharged home. He has been staying in a friends warehouse business which he states has an apartment (kitchen and bathroom).  He has support from his daughter and son. He has been falling recently and not managing well at home due to weakness.     Discussed option for Financial SW for review of MA possibility. He declines. Provided patient with [employer] forms.     PT/OT recommend TCU vs AR.     Referrals faxed to [facility] for AR review for criteria. Also faxed to two TCU. Patient has [facility] TCU list. He will need [facility] insurance auth.    Transportation TBD.    Connie Delcid RN

## 2022-03-14 NOTE — PROGRESS NOTES
03/14/22 1044   Quick Adds   Quick Adds Certification   Type of Visit Initial PT Evaluation   Living Environment   Living Environment Comments see OT notes this section   Self-Care   Number of times patient has fallen within last six months   (starting falling 4 days ago)   Activity/Exercise/Self-Care Comment see OT notest this section   General Information   Onset of Illness/Injury or Date of Surgery 03/10/22   Referring Physician sharita worrell   Patient/Family Therapy Goals Statement (PT) get back home on my own with kids help   Pertinent History of Current Problem (include personal factors and/or comorbidities that impact the POC) CVA was 12/18/21; was in rehab until very recently   Cognition   Orientation Status (Cognition) oriented x 4   Pain Assessment   Patient Currently in Pain   (c/o R buttock pain)   Integumentary/Edema   Integumentary/Edema Comments   (large bruise R buttock from falling)   Strength (Manual Muscle Testing)   Strength Comments   (at least fair SLR's; good bridge. DF R 4-/L4)   Bed Mobility   Comment, (Bed Mobility)   (supin<>sit very gingerly, avoiding WB on R buttock, but inde)   Transfers   Comment, (Transfers)   (initially very delayed letting go of bed on arising)   Gait/Stairs (Locomotion)   Distance in Feet (Required for LE Total Joints) 150   Comment, (Gait/Stairs) see treatmnet sheet   Balance   Balance Comments   (static stand tight TACO/sternal nudge OK. SLS on L 2 sec.)   Sensory Examination   Sensory Perception patient reports no sensory changes   Clinical Impression   Criteria for Skilled Therapeutic Intervention Yes, treatment indicated   PT Diagnosis (PT) impaired functional mobility   Influenced by the following impairments pain, unsteadiness   Functional limitations due to impairments gait   Clinical Presentation (PT Evaluation Complexity) Stable/Uncomplicated   Clinical Presentation Rationale presents as medially diagnosed   Clinical Decision Making (Complexity) low  complexity   Planned Therapy Interventions (PT) gait training;balance training;stair training   Anticipated Equipment Needs at Discharge (PT)   (to be determined (PLF no device after CVA rehab))   Risk & Benefits of therapy have been explained evaluation/treatment results reviewed;patient   PT Discharge Planning   PT Discharge Recommendation (DC Rec) Transitional Care Facility   PT Rationale for DC Rec lives alone; currently poor gait safety/fall risk   Therapy Certification   Start of care date 03/14/22   Certification date from 03/14/22   Certification date to 04/13/22   Medical Diagnosis failure to thrive   Total Evaluation Time   Total Evaluation Time (Minutes) 26   Physical Therapy Goals   PT Frequency Daily   PT Predicated Duration/Target Date for Goal Attainment 03/19/22   PT Goals Gait;Stairs   PT: Gait Independent;150 feet   PT: Stairs 4 stairs;Rail on left;Independent

## 2022-03-14 NOTE — PROGRESS NOTES
[facility]    Medicine Progress Note - Hospitalist Service    Date of Admission:  3/13/2022    Assessment & Plan          Jorge Young is a 66 year old male admitted on 3/13/2022. He has a complex hx of recent complex hospital stay at [facility]. On  11/3/2021 and had left carotid endarterectomy then on 12/18/21 readmitted at [facility] on with confusion and MRI head showed multiple small foci of acute/subacute ischemic injury involving the right can, right parietal lobe, right frontal lobe deep white matter.  Patient had a seizure-like activity on 12/19/2021.  Patient was intubated on 12/20/2021, extubated 12/31/2021 but re intubated the same day due to stridors and respiratory distress.  On 1/3/2022 a CTA chest showed large burden bilateral acute pulmonary embolism including at the bifurcation of the main pulmonary artery, combination of pneumonia and pulmonary infarction with mild to moderate pleural effusions.  Patient was treated with IV heparin for PE and Unasyn for pneumonia and UTI.  He had a tracheostomy placed on 1/10/2022 and discharged to transitional care facility on 1/20/2022.  However, patient had bleeding from tracheostomy site and was readmitted on 1/21/2022 for revision to control bleeding.  Patient had mild recurrent bleed that resolved after silver nitrate catheterization along the left lateral aspect of stoma on 1/25/2022.  He was discharged back to TCU on 1/26/2022 on Lovenox for anticoagulation. His feeding tube was removed by 2/22/22 and was on regular diet since.    He told me he was discharged from TCU a week ago to home alone    He was brought to the emergency department for evaluation of intermittent nausea, vomiting, constipation, falls found to have dehydration, NATALIA, possible right elbow cellulitis, right buttock possible hematoma, leukocytosis     1.  Failure to thrive/falls  --since home from TCU falling a lot  --ct head no new cva, evolution of  prior cva  --fall risk  --pt/ot--likely will need tcu again  --will get neuro to  re-eval with such recent CVA-complex    2.Dehydration  -ivf  -try diet    3.NATALIA  -pre-renal looking  -ivf helping, creat better  -avoid nephrotoxins  -hold ARB today, consider restart tomorrow  -hold hctz    4.N/V   -no abd pain now  -but constipation  -bowel meds  -ct neg    5.hx of CVA  -12/21  -on Lovenox  -on statin  -s/p CEA surg 11/21  -pt/ot, neuro    6.DM  -with NATALIA, will start lantus lower dose  -sliding scale    7.Hx of htn  -hold arb and hydrochlorothiazide  -clarify home meds    8.Seizure disorder  - Keppra and Vimpat, check levels  -neuro to see with all of his falls, living alone, ?if any sz activity    9. Right elbow erythema  -mild   -?cellulitis --started ancef  -wbc down, trend, blood cx check procal, crp    10.Right elbow swelling  -ortho to see, ?bursitis, traumatic  No fx    11.Right buttock likely hematoma    12.Hx of PE  -Lovenox stay on this           Diet: Combination Diet Moderate Consistent Carb (60 g CHO per Meal) Diet; Low Saturated Fat Na <2400mg Diet    DVT Prophylaxis: Enoxaparin (Lovenox) SQ  Mora Catheter: Not present  Central Lines: None  Cardiac Monitoring: None  Code Status: Full Code      Disposition Plan   Expected Discharge:days       The patient's care was discussed with the Bedside Nurse and Patient. I called daughter to go over all of this--at 1321--they shared that he left TCU when insurance would not pay and he could not afford it. He was not ready to leave  Jogre went thru a divorce 2 years ago and for past year has been living in a friend's warehouse --I called SW to update and request Acute rehab/TCU eval    Brenda Auguste MD  Hospitalist Service  [facility]  Securely message with the Vocera Web Console (learn more here)  Text page via [employer] Paging/Directory           ______________________________________________________________________    Interval History    He notes he has been falling all week since sent home from TCU  He denied focal pain, except right buttock area sore with some swelling  Notes he hit his arm falling on right elbow  He notes n/v and constipation  No headache      Data reviewed today: I reviewed all medications, new labs and imaging results over the last 24 hours. I personally reviewed no images or EKG's today.    Physical Exam   Vital Signs: Temp: 98.2  F (36.8  C) Temp src: Oral BP: 128/60 Pulse: 81   Resp: 18 SpO2: 98 % O2 Device: None (Room air)    Weight: 186 lbs 0 oz  Constitutional: awake, fatigued, alert, cooperative and no apparent distress  Respiratory: No increased work of breathing, good air exchange, clear to auscultation bilaterally, no crackles or wheezing  Cardiovascular: Normal apical impulse, regular rate and rhythm, normal S1 and S2, no S3 or S4, and no murmur noted  GI: normal bowel sounds, soft, non-distended and non-tender  Skin: right elbow erythema noted on small area  Musculoskeletal: right elbow some swelling, ROM intact, right buttock swelling --no erythema noted  Neurologic: Mental Status Exam:  Level of Alertness:   awake  Attention/Concentration:  normal  Language:  normal  Motor Exam:  Moves all ext , left side hand  and left leg slightly less than right  Neuropsychiatric: General: normal, calm and normal eye contact    Data   Recent Labs   Lab 03/14/22  0808 03/14/22  0735 03/13/22 2022 03/13/22  1812   WBC  --  11.2*  --  13.3*   HGB  --  10.7*  --  12.7*   MCV  --  87  --  86   PLT  --  276  --  308   NA  --  137  --  136   POTASSIUM  --  3.6  --  3.7   CHLORIDE  --  98  --  93*   CO2  --  28  --  27   BUN  --  23*  --  30*   CR  --  1.05  --  1.52*   ANIONGAP  --  11  --  16   GLENN  --  9.0  --  9.6   GLC 91 109 166* 182*   ALBUMIN  --  3.5  --  4.0   PROTTOTAL  --  7.5  --  8.6*   BILITOTAL  --  0.5  --  0.5   ALKPHOS  --  96  --  108   ALT  --  19  --  24   AST  --  21  --  16     Recent Results (from the  past 24 hour(s))   CT Abdomen Pelvis w Contrast    Narrative    EXAM: CT ABDOMEN PELVIS W CONTRAST  LOCATION: [facility]  DATE/TIME: 3/13/2022 8:06 PM    INDICATION: Bowel obstruction suspected.  COMPARISON: 01/03/2022.  TECHNIQUE: CT scan of the abdomen and pelvis was performed following injection of IV contrast. Multiplanar reformats were obtained. Dose reduction techniques were used.  CONTRAST: Isovue 370 75 mL.    FINDINGS:   LOWER CHEST: There is some mild bibasilar atelectasis with no central airway obstruction. Mild vascular calcification.    HEPATOBILIARY: There is a minute simple appearing cyst seen in the left lobe of liver with no follow-up recommended. Liver, gallbladder, and bile ducts are otherwise unremarkable.    PANCREAS: Normal.    SPLEEN: Normal.    ADRENAL GLANDS: There is a 1.9 cm x 1.5 cm well-circumscribed low density lesion seen in the left adrenal gland with average Hounsfield units at -5, this should be benign and no follow-up is needed. Right adrenal gland is normal.    KIDNEYS/BLADDER: Normal.    BOWEL: Normal.    LYMPH NODES: Normal.    VASCULATURE: Mild to moderate scattered calcifications with no aneurysmal dilatation.    PELVIC ORGANS: Normal.    MUSCULOSKELETAL: Degenerative disc disease at L4 and L5.      Impression    IMPRESSION:   1.  No acute findings seen to explain patient's pain clinically. Given Hounsfield units of left adrenal gland lesion, this should be benign and no follow-up is needed. No significant changes since 01/03/2022.   Head CT w/o contrast    Narrative    EXAM: CT HEAD W/O CONTRAST  LOCATION: [facility]  DATE/TIME: 3/13/2022 10:57 PM    INDICATION: Fall. Anticoagulated. Evaluate for subdural hemorrhage.  COMPARISON: 01/24/2021.  TECHNIQUE: Routine CT Head without IV contrast. Multiplanar reformats. Dose reduction techniques were used.    FINDINGS: Residual intravascular contrast limits evaluation for subtle  hemorrhage.    INTRACRANIAL CONTENTS: No intracranial hemorrhage, extraaxial collection, or mass effect. Redemonstrated infarcts in the right occipital and parietal lobes. There appears to be subtle contrast-enhancement of the evolving infarct in the right occipital   lobe. No new gray-white differentiation loss. Moderate presumed chronic small vessel ischemic changes. Mild generalized volume loss. No hydrocephalus.     VISUALIZED ORBITS/SINUSES/MASTOIDS: No intraorbital abnormality. Trace mucosal thickening of the maxillary sinuses. No middle ear or mastoid effusion.    BONES/SOFT TISSUES: No acute abnormality.      Impression    IMPRESSION:  1.  No definite acute intracranial hemorrhage.  2.  Subtle enhancement associated with the evolving infarct in the right occipital lobe. Redemonstrated chronic right parietal lobe infarct.   XR Elbow Right 2 Views    Narrative    EXAM: XR ELBOW RIGHT 2 VIEWS  LOCATION: [facility]  DATE/TIME: 3/14/2022 12:40 AM    INDICATION: Right elbow pain.  COMPARISON: None.      Impression    IMPRESSION: No fracture, dislocation or significant joint effusion. Soft tissue thickening and edema along the dorsal aspect of the elbow suggests olecranon bursitis and cellulitis. Traumatic contusion in the differential diagnosis.   XR Chest Port 1 View    Narrative    EXAM: XR CHEST PORT 1 VIEW  LOCATION: [facility]  DATE/TIME: 3/14/2022 7:51 AM    INDICATION: 66 y o with leukocytosis, n v, ?aspiration  COMPARISON: 01/25/2022      Impression    IMPRESSION: Tracheostomy has been removed. Lungs are clear.

## 2022-03-14 NOTE — PROGRESS NOTES
[facility]      OUTPATIENT OCCUPATIONAL THERAPY  EVALUATION  PLAN OF TREATMENT FOR OUTPATIENT REHABILITATION  (COMPLETE FOR INITIAL CLAIMS ONLY)  Patient's Last Name, First Name, M.I.  YOB: 1955  Jorge Young                          Provider's Name  [facility] Medical Record No.  [id number]                               Onset Date:  03/13/22   Start of Care Date:  (P) 03/13/22     Type:     ___PT   _X_OT   ___SLP Medical Diagnosis:  (P) Adult failure to thrive                         OT Diagnosis:  (P) Decreased ADL independence    Visits from SOC:  1   _________________________________________________________________________________  Plan of Treatment/Functional Goals    Planned Interventions: (P) ADL retraining, IADL retraining, balance training, strengthening, transfer training   Goals: See Occupational Therapy Goals on Care Plan in [facility] electronic health record.    Therapy Frequency:    Predicted Duration of Therapy Intervention:    _________________________________________________________________________________    I CERTIFY THE NEED FOR THESE SERVICES FURNISHED UNDER        THIS PLAN OF TREATMENT AND WHILE UNDER MY CARE .             Physician Signature               Date    X_____________________________________________________                      Certification date from: (P) 03/13/22, Certification date to: (P) 03/21/22    Referring Physician: Vikas Beckford,              Initial Assessment        See Occupational Therapy evaluation dated (P) 03/13/22 in Epic electronic health record.

## 2022-03-14 NOTE — PLAN OF CARE
"PRIMARY DIAGNOSIS: \"GENERIC\" NURSING  OUTPATIENT/OBSERVATION GOALS TO BE MET BEFORE DISCHARGE:  ADLs back to baseline: No    Activity and level of assistance: Up with standby assistance.    Pain status: Improved-controlled with oral pain medications.    Return to near baseline physical activity: No    Patient arrived on unit at start of shift. Pt denies pain but takes Tylenol PO if needed. He is up to bathroom with stand by assist.   Discharge Planner Nurse   Safe discharge environment identified: No  Barriers to discharge: Yes       Entered by: Katerina Ruiz 03/14/2022 4:59 PM     Please review provider order for any additional goals.   Nurse to notify provider when observation goals have been met and patient is ready for discharge.Goal Outcome Evaluation:                      "

## 2022-03-14 NOTE — PROGRESS NOTES
[facility]      OUTPATIENT PHYSICAL THERAPY EVALUATION  PLAN OF TREATMENT FOR OUTPATIENT REHABILITATION  (COMPLETE FOR INITIAL CLAIMS ONLY)  Patient's Last Name, First Name, M.I.  YOB: 1955  LinseyjeffyJorge  STACY                        Provider's Name  [facility] Medical Record No.  [id number]                               Onset Date:  03/10/22   Start of Care Date:  (P) 03/14/22      Type:     _X_PT   ___OT   ___SLP Medical Diagnosis:  (P) failure to thrive                        PT Diagnosis:  impaired functional mobility   Visits from SOC:  1   _________________________________________________________________________________  Plan of Treatment/Functional Goals    Planned Interventions: gait training, balance training, stair training     Goals: See Physical Therapy Goals on Care Plan in [employer] electronic health record.    Therapy Frequency: Daily  Predicted Duration of Therapy Intervention: 03/19/22  _________________________________________________________________________________    I CERTIFY THE NEED FOR THESE SERVICES FURNISHED UNDER        THIS PLAN OF TREATMENT AND WHILE UNDER MY CARE     (Physician co-signature of this document indicates review and certification of the therapy plan).                Certification date from: (P) 03/14/22, Certification date to: (P) 04/13/22    Referring Physician: sharita worrell            Initial Assessment        See Physical Therapy evaluation dated (P) 03/14/22 in Epic electronic health record.

## 2022-03-14 NOTE — ED PROVIDER NOTES
"EMERGENCY DEPARTMENT NOTE     Name: Jorge Young    Age/Sex: 66 year old male   MRN: [id number]   Evaluation Date & Time:  3/13/2022  6:31 PM    PCP:    Ashley Narvaez   ED Provider: Vikas Beckford D.O.       CHIEF COMPLAINT    Constipation     For the past 8 days, the patient endorses constipation. He also reports something \"hard\" and pain to right buttocks as well as vomiting. Denies previous abdominal surgeries.    DIAGNOSIS & DISPOSITION     1. Generalized muscle weakness    2. Hx of vomiting    3. Traumatic hematoma of buttock, initial encounter      [place] [facility] [place]    At the conclusion of the encounter I discussed the results of all of the tests and the disposition. The questions were answered. The patient or family acknowledged understanding and was agreeable with the care plan.    TOTAL CRITICAL CARE TIME (EXCLUDING PROCEDURES): Not applicable    PROCEDURES:   None    EMERGENCY DEPARTMENT COURSE/MEDICAL DECISION MAKING   7:02 PM I met with the patient to gather history and to perform my initial exam.  We discussed treatment options and the plan for care while in the Emergency Department.  7:14 PM Ordered CT scan.  10:09 PM Discussed with nursing staff.  10:20 PM Spoke with Dr. Crump, hospitalist, who accepts the patient for admission.  10:24 PM Updated the patient.    Jorge Young is a 66 year old male with relevant past history of a stroke, diabetes mellitus type 2, hypertension, hyperlipidemia, seizure disorder, syncope, aphasia, acute encephalopathy, CVA, carotid artery stenosis with infarction, saddle pulmonary embolus, hyponatremia, acute respiratory failure, cerebral infarction, and anemia who presents to the emergency department for evaluation of constipation.    Triage note reviewed:Patient arrives with family.  Reports no BM x 8 days.  Taking ducolax and miralax at home without relief- eating raisins and prunes.  C/o rectal discomfort- notes that the right " side of his rectum is hard.  Patient also states emesis x 5 days.  Family mentions that patient was recently admitted for stroke- had feeding tube placed at that time which has been since removed and patient has been having intermittent issues with N/V since feeding tube removed.  Also noted patient to be weaker today.  Actively vomiting in triage.     Differential  diagnosis  considered included but not limited to bowel obstruction or perforation or other acute intra-abdominal process.  With complaint of right buttock pain considered cellulitis abscess or perirectal abscess.  Vital signs:BP (!) 150/79   Pulse 73   Temp (!) 96.7  F (35.9  C) (Temporal)   Resp 19   Wt 84.4 kg (186 lb)   SpO2 99%   BMI 29.13 kg/m    Pertinent physical exam findings:  General: Alert normal mental status  Abdomen: Soft nontender, positive bowel sounds.  No organomegaly or mass  On his right buttock he has a firm area that would be consistent with hematoma.  No cellulitis or fluctuance.  Perirectal area appeared normal.  Rectal vault without significant stool  Neuro: Cranial nerves II through XII grossly intact.  Patient has generalized weakness in all extremities 4 out of 5 but without apparent focal weakness.  Diagnostic studies:  Imaging:  Head CT w/o contrast   Final Result   IMPRESSION:   1.  No definite acute intracranial hemorrhage.   2.  Subtle enhancement associated with the evolving infarct in the right occipital lobe. Redemonstrated chronic right parietal lobe infarct.      CT Abdomen Pelvis w Contrast   Final Result   IMPRESSION:    1.  No acute findings seen to explain patient's pain clinically. Given Hounsfield units of left adrenal gland lesion, this should be benign and no follow-up is needed. No significant changes since 01/03/2022.         Lab:  Labs Ordered and Resulted from Time of ED Arrival to Time of ED Departure   COMPREHENSIVE METABOLIC PANEL - Abnormal       Result Value    Sodium 136      Potassium 3.7       Chloride 93 (*)     Carbon Dioxide (CO2) 27      Anion Gap 16      Urea Nitrogen 30 (*)     Creatinine 1.52 (*)     Calcium 9.6      Glucose 182 (*)     Alkaline Phosphatase 108      AST 16      ALT 24      Protein Total 8.6 (*)     Albumin 4.0      Bilirubin Total 0.5      GFR Estimate 50 (*)    CBC WITH PLATELETS AND DIFFERENTIAL - Abnormal    WBC Count 13.3 (*)     RBC Count 4.42      Hemoglobin 12.7 (*)     Hematocrit 38.2 (*)     MCV 86      MCH 28.7      MCHC 33.2      RDW 14.0      Platelet Count 308      % Neutrophils 78      % Lymphocytes 15      % Monocytes 6      % Eosinophils 0      % Basophils 1      % Immature Granulocytes 0      NRBCs per 100 WBC 0      Absolute Neutrophils 10.4 (*)     Absolute Lymphocytes 2.1      Absolute Monocytes 0.8      Absolute Eosinophils 0.0      Absolute Basophils 0.1      Absolute Immature Granulocytes 0.1      Absolute NRBCs 0.0     GLUCOSE BY METER - Abnormal    GLUCOSE BY METER POCT 166 (*)    COVID-19 VIRUS (CORONAVIRUS) BY PCR - Normal    SARS CoV2 PCR Negative     ROUTINE UA WITH MICROSCOPIC REFLEX TO CULTURE      Interventions: IV fluids  Medical decision making: I discussed current findings with the patient and his son.  Patient was recently discharged home from TCU.  Over the past week he has had progressive generalized weakness and has had several falls.  Currently unable to ambulate without full assistance and the son does not think feel that he can adequately care for him.  Because of the generalized weakness is not identified.  CT of the head does not show any subdural, intracranial hemorrhage or evidence of new infarct.  CBC and comprehensive metabolic profile within normal limits.  Patient was admitted for observation, obtain urinalysis, PT and OT consult.    PPE: Provider wore gloves, N95 mask, eye protection, surgical cap, and paper mask.  ED INTERVENTIONS     Medications   0.9% sodium chloride BOLUS (0 mLs Intravenous Stopped 3/13/22 5136)     Followed  by   sodium chloride 0.9% infusion (has no administration in time range)   iopamidol (ISOVUE-370) solution 75 mL (75 mLs Intravenous Given 3/13/22 2010)       DISCHARGE MEDICATIONS        Review of your medicines      UNREVIEWED medicines. Ask your doctor about these medicines      Dose / Directions   acetaminophen 325 MG tablet  Commonly known as: TYLENOL  Used for: Acute post-operative pain      Dose: 650 mg  Take 2 tablets (650 mg) by mouth every 4 hours as needed for other (For optimal non-opioid multimodal pain management to improve pain control.)  Refills: 0     atorvastatin 80 MG tablet  Commonly known as: LIPITOR  Indication: High Amount of Fats in the Blood  Used for: Mixed hyperlipidemia      Dose: 80 mg  Take 1 tablet (80 mg) by mouth daily  Quantity: 90 tablet  Refills: 3     enoxaparin ANTICOAGULANT 80 MG/0.8ML syringe  Commonly known as: LOVENOX      Dose: 80 mg  Inject 80 mg Subcutaneous every 12 hours  Refills: 0     famotidine 20 MG tablet  Commonly known as: PEPCID      Dose: 20 mg  Take 20 mg by mouth 2 times daily  Refills: 0     hydrochlorothiazide 50 MG tablet  Commonly known as: HYDRODIURIL  Used for: Acute embolic stroke (H), Essential hypertension      Dose: 75 mg  Take 1.5 tablets (75 mg) by mouth daily  Quantity: 135 tablet  Refills: 3     Lantus SoloStar 100 UNIT/ML pen  Generic drug: insulin glargine      Dose: 38 Units  Inject 38 Units Subcutaneous 2 times daily  Refills: 0     levETIRAcetam 750 MG tablet  Commonly known as: KEPPRA      Dose: 1,500 mg  Take 1,500 mg by mouth 2 times daily  Refills: 0     losartan 50 MG tablet  Commonly known as: COZAAR  Used for: Uncontrolled type 2 diabetes mellitus with hyperglycemia (H), Acute embolic stroke (H), Essential hypertension      Dose: 50 mg  Take 1 tablet (50 mg) by mouth daily  Quantity: 90 tablet  Refills: 3     multivitamin w/minerals tablet      Dose: 1 tablet  Take 1 tablet by mouth daily  Refills: 0     polyethylene glycol-propylene  glycol 0.4-0.3 % Soln ophthalmic solution  Commonly known as: SYSTANE ULTRA      Dose: 1 drop  Place 1 drop into both eyes every hour as needed for dry eyes  Refills: 0     sertraline 25 MG tablet  Commonly known as: ZOLOFT      Dose: 25 mg  Take 25 mg by mouth daily  Refills: 0     Trulicity 0.75 MG/0.5ML pen  Generic drug: dulaglutide      Dose: 0.75 mg  Inject 0.75 mg Subcutaneous every 7 days Every Wednesday  Refills: 0     Vimpat 200 MG Tabs tablet  Generic drug: lacosamide      Dose: 200 mg  Take 200 mg by mouth 2 times daily  Refills: 0        CONTINUE these medicines which have NOT CHANGED      Dose / Directions   Accu-Chek Guide Me w/Device Kit  Used for: Uncontrolled type 2 diabetes mellitus with hyperglycemia (H)      Dose: 1 each  1 each daily  Quantity: 1 kit  Refills: 0     Accu-Chek Guide test strip  Used for: Uncontrolled type 2 diabetes mellitus with hyperglycemia (H)  Generic drug: blood glucose      Use to test blood sugar 1 time daily.  Quantity: 50 strip  Refills: 11     blood glucose monitoring lancets  Used for: Uncontrolled type 2 diabetes mellitus with hyperglycemia (H)      Use to test blood sugar 1 time daily.  Quantity: 100 each  Refills: 4     FreeStyle Constantino 2 Sensor Misc  Used for: Acute CVA (cerebrovascular accident) (H), Type 2 diabetes mellitus with both eyes affected by moderate nonproliferative retinopathy and macular edema, without long-term current use of insulin (H)      Dose: 1 each  1 each every 14 days 1 each every 14 days. Change every 14 days.  Quantity: 2 each  Refills: 5              INFORMATION SOURCE AND LIMITATIONS    History/Exam limitations: None  Patient information was obtained from: patient and triage note  Use of : N/A    HISTORY OF PRESENT ILLNESS   Jorge Young male with a relevant past history of a stroke, diabetes mellitus type 2, hypertension, hyperlipidemia, seizure disorder, syncope, aphasia, acute encephalopathy, CVA, carotid artery stenosis  "with infarction, saddle pulmonary embolus, hyponatremia, acute respiratory failure, cerebral infarction, and anemia who presents to this ED via private car for evaluation of constipation.    Per triage note, the patient has had intermittent nausea and vomiting since feeding tube was removed.    For the past 8 days (since 3/5/2022), the patient reports constipation. In addition, he endorses something \"hard\" in his right buttocks with associated pain. Endorses vomiting for 5 days. Denies previous abdominal surgeries, but reports history of a stroke in 12/18/2021 where he had a feeding tube placed and was put in a medically-induced coma. The feeding tube was taken out 3 weeks ago, He has been on Murelax for the past 5 days (since 3/8), and before was taking Dulcolax chewable tablets, dates, raisins, and prune juice without relief. The patient denies stomach pain, or any other complaints at this time.    REVIEW OF SYSTEMS:   Constitutional: Negative for  fever.   HENT: Negative for URI symptoms or sore throat.    Cardiac: Negative for  chest pain,palpitations, near syncope or syncope  Respiratory: Negative for cough and shortness of breath.    Gastrointestinal: Negative for abdominal pain, diarrhea, rectal bleeding or melena. Positive for nausea, vomiting, and constipation.  Genitourinary: Negative for dysuria, flank pain and hematuria.   Musculoskeletal: Negative for back pain.   Skin: Negative for  rash  Neurological: Negative for dizziness, headache, syncope, speech difficulty, unilateral weakness or imbalance with walking.   Hematological: Negative for adenopathy. Does not bruise/bleed easily.   Psychiatric/Behavioral: Negative for confusion.       PATIENT HISTORY     Past Medical History:   Diagnosis Date     Benign essential hypertension      COVID-19 virus infection      Diabetes mellitus (H)      Hyperlipidemia LDL goal <100      Left facial numbness 07/16/2020     Left hand weakness 07/16/2020     Seizures (H)  "     Stroke (H)      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     Syncope, unspecified syncope type     History of seizure     Aphasia     History of embolic stroke     Acute encephalopathy     Left facial numbness     Left hand weakness     Atherosclerosis of left carotid artery     Acute CVA (cerebrovascular accident) (H)     Symptomatic carotid artery stenosis with infarction (H)     Seizures (H)     Status epilepticus (H)     Saddle pulmonary embolus (H)     Hyponatremia     Hemorrhage from tracheostomy stoma (H)     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 seizures (H)     Acute respiratory failure (H)     Acute renal insufficiency     Cerebral infarction (H)     Acute cystitis     Acute blood loss anemia     Hospital discharge follow-up     Weakness     Past Surgical History:   Procedure Laterality Date     APPENDECTOMY OPEN       ENDARTERECTOMY CAROTID Left 11/4/2021    Procedure: ENDARTERECTOMY, CAROTID; left with electroencephalogram and intraoperative ultrasound;  Surgeon: Xavi Frazier MD;  [place] [facility] [place]     [facility]  Smoking:  Alcohol Use:  Allergies   Allergen Reactions     Novocain [Procaine] Other (See Comments)     Tiredness           OUTPATIENT MEDICATIONS     New Prescriptions    No medications on file      Vitals:    03/13/22 1809 03/13/22 1900 03/13/22 1945   BP: (!) 158/107 (!) 146/67 (!) 150/79   Pulse: 82 69 73   Resp: 19     Temp: (!) 96.7  F (35.9  C)     TempSrc: Temporal     SpO2: 96%  90% 99%   Weight: 84.4 kg (186 lb)         Physical Exam   Constitutional: Oriented to person, place, and time. Appears well-developed and well-nourished.   HEENT:    Head: Atraumatic.   Neck: Normal range of motion. Neck supple.   Cardiovascular: Normal rate, regular rhythm and normal heart sounds.    Pulmonary/Chest: Normal effort  and breath sounds normal.   Abdominal: Soft. Bowel sounds are normal.   Musculoskeletal: Normal range of motion. Right buttocks tender, probable hematoma, no fluctuance or erythema.  Rectal: No hard stool found in rectal vault. Stenotic.   Neurological: Alert and oriented to person, place, and time.  4/ 5 motor strength upper and lower extremities symmetric no sensory deficit. No cranial nerve deficit . Skin: Skin is warm and dry.   Psychiatric: Normal mood and affect. Behavior is normal. Thought content normal.       DIAGNOSTICS    LABORATORY FINDINGS (REVIEWED AND INTERPRETED):  Labs Ordered and Resulted from Time of ED Arrival to Time of ED Departure   COMPREHENSIVE METABOLIC PANEL - Abnormal       Result Value    Sodium 136      Potassium 3.7      Chloride 93 (*)     Carbon Dioxide (CO2) 27      Anion Gap 16      Urea Nitrogen 30 (*)     Creatinine 1.52 (*)     Calcium 9.6      Glucose 182 (*)     Alkaline Phosphatase 108      AST 16      ALT 24      Protein Total 8.6 (*)     Albumin 4.0      Bilirubin Total 0.5      GFR Estimate 50 (*)    CBC WITH PLATELETS AND DIFFERENTIAL - Abnormal    WBC Count 13.3 (*)     RBC Count 4.42      Hemoglobin 12.7 (*)     Hematocrit 38.2 (*)     MCV 86      MCH 28.7      MCHC 33.2      RDW 14.0      Platelet Count 308      % Neutrophils 78      % Lymphocytes 15      % Monocytes 6      % Eosinophils 0      % Basophils 1      % Immature Granulocytes 0      NRBCs per 100 WBC 0      Absolute Neutrophils 10.4 (*)     Absolute Lymphocytes 2.1      Absolute Monocytes 0.8      Absolute Eosinophils 0.0      Absolute Basophils 0.1      Absolute Immature  Granulocytes 0.1      Absolute NRBCs 0.0     GLUCOSE BY METER - Abnormal    GLUCOSE BY METER POCT 166 (*)    COVID-19 VIRUS (CORONAVIRUS) BY PCR - Normal    SARS CoV2 PCR Negative     ROUTINE UA WITH MICROSCOPIC REFLEX TO CULTURE         IMAGING (REVIEWED AND INTERPRETED):  Head CT w/o contrast   Final Result   IMPRESSION:   1.  No definite acute intracranial hemorrhage.   2.  Subtle enhancement associated with the evolving infarct in the right occipital lobe. Redemonstrated chronic right parietal lobe infarct.      CT Abdomen Pelvis w Contrast   Final Result   IMPRESSION:    1.  No acute findings seen to explain patient's pain clinically. Given Hounsfield units of left adrenal gland lesion, this should be benign and no follow-up is needed. No significant changes since 01/03/2022.            I, Lin Cooper, am serving as a scribe to document services personally performed by Vikas Beckford D.O., based on my observation and the provider s statements to me.    I, Vikas Beckford D.O., attest that Lin Cooper is acting in a scribe capacity, has observed my performance of the services and has documented them in accordance with my direction.    Vikas Beckford D.O.  EMERGENCY MEDICINE   03/13/22  [facility] EMERGENCY DEPARTMENT  [facility] [facility] [id number] [phone]  Dept: [phone]       Vikas Beckford DO  03/13/22 2769

## 2022-03-14 NOTE — PROGRESS NOTES
"Clinic Care Coordination Contact  [facility]/Voicemail    Reason: Referral to Clinic Care Coordination Service  Referred by: Ashley Narvaez PA-C     Clinical Data: Care Coordinator Outreach:  Outreach attempted x 1: Patient was identified as a potential candidate and referred to Clinic Care Coordination Service. CHW called the patient to discuss enrollment into CCC service and no answer. Left message on patient's voicemail with call back information and requested return call.  Note/comment: Per pt appt desk reviewed; pt have an appt at 2:00PM today with \"[facility].\" CHW will try to connect with patient again in 1-2 business days.  Plan: Care Coordinator will try to reach patient again in 1-2 business days.      "

## 2022-03-15 NOTE — UTILIZATION REVIEW
Concurrent stay review; Secondary Review Determination       Under the authority of the Utilization Management Committee, the utilization review process indicated a secondary review on the above patient.  The review outcome is based on review of the medical records, discussions with staff, and applying clinical experience noted on the date of the review.          (x) Observation Status Appropriate - Concurrent stay review    RATIONALE FOR DETERMINATION     Mr. Jenkins is a 65 yo male pt with recent complex hospitalization resulting in trach, feeding tube and TCU stay who presents to the ED with falls, weakness and failure to thrive one week after discharge from TCU setting. No new CVA on imaging; neuro recommends no changes in seizure meds.  Dehydration and NATALIA resolved with IVF given - now saline locked.  No acute infectious process and vitals are stable.  Remaining in the hospital for safe discharge planning.         Patient is clinically improving and there is no clear indication to change patient's status to inpatient. The severity of illness, intensity of service provided, expected LOS and risk for adverse outcome make the care appropriate for observation.      The information on this document is developed by the utilization review team in order for the business office to ensure compliance.  This only denotes the appropriateness of proper admission status and does not reflect the quality of care rendered.         The definitions of Inpatient Status and Observation Status used in making the determination above are those provided in the CMS Coverage Manual, Chapter 1 and Chapter 6, section 70.4.          Sincerely,       Jessica Lawrence, DO  Utilization Review  Physician Advisor  [facility].

## 2022-03-15 NOTE — PROGRESS NOTES
SW assisting RN Care Manager. SW made referral to [facility] via [facility] and faxed referral packet.  NANCY Horn on 3/15/2022 at 3:19 PM

## 2022-03-15 NOTE — CONSULTS
Care Management Follow Up    Length of Stay (days): 0    Expected Discharge Date: 03/16/2022     Concerns to be Addressed:   Discharge planning, medical progression    Patient plan of care discussed at interdisciplinary rounds: Yes    Anticipated Discharge Disposition:  Goal is Medical Respite with home care (PT/OT)     Anticipated Discharge Services:  To be determined  Anticipated Discharge DME:  To be determined    Education Provided on the Discharge Plan:  Yes, per care team  Patient/Family in Agreement with the Plan:  Yes    Referrals Placed by CM/SW:  [facility], Home Care  Private pay costs discussed: Not applicable    Additional Information:  Chart review completed.    9:20 AM - Received update recommendation from physical therapist.  Patient mobility much improved today.  Recommendation changed to home with assist, home with home care.    12:00 PM - Received update from [facility] [place] that patient's daughter is at bedside and would like to discuss discharge planning with CM.  Writer met with patient and his daughter, Bailey ([phone]), to introduce self and reviewed role of care management (CM).  Bailey reports that patient has been living [place] a [place] warehouse.  Patient states he has a full kitchen and a bathroom down the miller.  Bailey states that she is concerned for patient safety staying in warehouse and recent falls.  She reports that her dad is unable to stay with her or her brother.  She has 4 dogs and patient is allergic. Son lives with ex-wife which is not an option either.    Discussed possible option of medical respite at [facility] as patient is essentially homeless.  Patient and daughter are agreeable with medical respite as a short term option.  Bailey states her dad needs to look at options of housing assistance and possible application for MA bur he has been hesitant of looking at MA as an option.  Patient is agreeable with recommendation of home care therapy  services.  Writer called [facility] and spoke with Rain regarding bed availability and referral.  Rain states that bed is available and to forward referral paperwork.   SW partner assisted with sending referral.  CM will follow up in the morning, 3/16. And send home care referral once destination is determined.  Family will transport patient.      Admission Initial Assessment Information:    Assessment completed with patient and with patient's verbal permission spoke with his daughter Bailey over the phone. Patient has had a difficult year. He was at [facility] in November and December for complex medical issues. He discharged to [facility] in January but sounds like he no longer had TCU coverage so discharged home. He has been staying in a friends warehouse business which he states has an apartment (kitchen and bathroom).  He has support from his daughter and son. He has been falling recently and not managing well at home due to weakness.     Carli Li RN

## 2022-03-15 NOTE — PLAN OF CARE
PRIMARY DIAGNOSIS: GENERALIZED WEAKNESS    OUTPATIENT/OBSERVATION GOALS TO BE MET BEFORE DISCHARGE  1. Orthostatic performed: N/A    2. Tolerating PO medications: Yes    3. Return to near baseline physical activity: No    4. Cleared for discharge by consultants (if involved): No    Discharge Planner Nurse   Safe discharge environment identified: Yes  Barriers to discharge: Yes, therapy  IV antibioics       Entered by: Sarah Cordon 03/15/2022 12:29 PM     Please review provider order for any additional goals.   Nurse to notify provider when observation goals have been met and patient is ready for discharge.Goal Outcome Evaluation:

## 2022-03-15 NOTE — PLAN OF CARE
PRIMARY DIAGNOSIS: GENERALIZED WEAKNESS    OUTPATIENT/OBSERVATION GOALS TO BE MET BEFORE DISCHARGE  1. Orthostatic performed: No    2. Tolerating PO medications: Yes    3. Return to near baseline physical activity: No    4. Cleared for discharge by consultants (if involved): No    Discharge Planner Nurse   Safe discharge environment identified: No  Barriers to discharge: Yes       Entered by: Carlos Eduardo Felix 03/15/2022 4:56 PM     Please review provider order for any additional goals.   Nurse to notify provider when observation goals have been met and patient is ready for discharge.    Pt alert and oriented. Pt c/o right elbow pain 4/10. Nurse offered ice pack and PRN pain medications, but pt refused at this time. Pt stated he will notify the nurse if needing PRN pain medications. Pt observed to have mild difficulty sitting on the side of the bed, but able to perform independently given time. Denies dizziness/lightheadedness. Denies SOB or dyspnea. Denies nausea/vomiting. /62. Pulse 84. Pt reminded to use call light for transfers and cares. Bed alarm on for pt safety d/t history of falls.

## 2022-03-15 NOTE — PLAN OF CARE
"PRIMARY DIAGNOSIS: \"GENERIC\" NURSING  OUTPATIENT/OBSERVATION GOALS TO BE MET BEFORE DISCHARGE:  ADLs back to baseline: Yes    Activity and level of assistance: Up with standby assistance.    Pain status: Improved-controlled with oral pain medications.    Return to near baseline physical activity: No     Discharge Planner Nurse   Safe discharge environment identified: No  Barriers to discharge: Yes       Entered by: Katerina Ruiz 03/14/2022 10:29 PM   Patient up to bathroom with standby assist. He ate 75% of meal. At dinner patient threw up part of his meal. He stated that the meat was too peppery.  He had no further c/o nausea and denies pain.  Please review provider order for any additional goals.   Nurse to notify provider when observation goals have been met and patient is ready for discharge.Goal Outcome Evaluation:                      "

## 2022-03-15 NOTE — PLAN OF CARE
PRIMARY DIAGNOSIS: GENERALIZED WEAKNESS    OUTPATIENT/OBSERVATION GOALS TO BE MET BEFORE DISCHARGE  1. Orthostatic performed: N/A    2. Tolerating PO medications: Yes    3. Return to near baseline physical activity: No    4. Cleared for discharge by consultants (if involved): No    Discharge Planner Nurse   Safe discharge environment identified: No  Barriers to discharge: Yes  IV antibiotics. therapy       Entered by: Sarah Cordon 03/15/2022 9:14 AM     Please review provider order for any additional goals.   Nurse to notify provider when observation goals have been met and patient is ready for discharge.Goal Outcome Evaluation:

## 2022-03-15 NOTE — PROGRESS NOTES
[facility]    Medicine Progress Note - Hospitalist Service    Date of Admission:  3/13/2022    Assessment & Plan                     Jorge Young is a 66 year old male admitted on 3/13/2022. He has a complex hx of recent complex hospital stay at [facility]. On  11/3/2021 and had left carotid endarterectomy then on 12/18/21 readmitted at [facility] on with confusion and MRI head showed multiple small foci of acute/subacute ischemic injury involving the right can, right parietal lobe, right frontal lobe deep white matter.  Patient had a seizure-like activity on 12/19/2021.  Patient was intubated on 12/20/2021, extubated 12/31/2021 but re intubated the same day due to stridors and respiratory distress.  On 1/3/2022 a CTA chest showed large burden bilateral acute pulmonary embolism including at the bifurcation of the main pulmonary artery, combination of pneumonia and pulmonary infarction with mild to moderate pleural effusions.  Patient was treated with IV heparin for PE and Unasyn for pneumonia and UTI.  He had a tracheostomy placed on 1/10/2022 and discharged to transitional care facility on 1/20/2022.  However, patient had bleeding from tracheostomy site and was readmitted on 1/21/2022 for revision to control bleeding.  Patient had mild recurrent bleed that resolved after silver nitrate catheterization along the left lateral aspect of stoma on 1/25/2022.  He was discharged back to TCU on 1/26/2022 on Lovenox for anticoagulation. His feeding tube was removed by 2/22/22 and was on regular diet since.    He told me he was discharged from TCU a week ago to home alone    He was brought to the emergency department for evaluation of intermittent nausea, vomiting, constipation, falls found to have dehydration, NATALIA, possible right elbow cellulitis, right buttock possible hematoma, leukocytosis     1.  Failure to thrive/falls  --since home from TCU falling a lot  --ct head no new cva,  evolution of prior cva  --fall risk  --pt/ot rec: TCU versus home with assist  --appreciate neuro consult, continue current seizure meds    NATALIA  -resolved with IVF  -resume arb  -continue to hold hydrochlorothiazide as was dehydrated  ---stop IVF    4.N/V   -appears resovled  --likely from constipation  ---no abd pain now, CT negative  ---continue bowel meds    5.hx of CVA  -12/21  -on Lovenox and statin  -s/p CEA surg 11/21  -pt/ot assessment per above  --neuro consulted    .DM  -accuchecks controlled on lantus lower dose  -sliding scale insulin  ---A1c 6.2     Hypertension  -resume arb  ---hold  hydrochlorothiazide  -clarify home meds    Seizure disorder  - Keppra and Vimpat, \  -keppra level slightly high but continue current level per neuro     Trocanteric bursitis  ---appreciate ortho consult; rec; Advised continued mobility and WBAT of the RUE. He may utilize heat, ice, NSAIDS, elevation, and compression (tubigrip sleeve or ACE) to alleviate his pain and discomfort. Would recommend outpatient follow up in approximately 1 week for repeat evaluation. .   -?cellulitis --started ancef but procal low, CRP low and bc ngtd so will stop   -xray no fracture    Right buttock likely hematoma  ---as ABL anemia from this  ---continuing lovenox  ---pain control    .Hx of PE  -Lovenox stay on this         Diet: Combination Diet Moderate Consistent Carb (60 g CHO per Meal) Diet; Low Saturated Fat Na <2400mg Diet    DVT Prophylaxis: Enoxaparin (Lovenox) SQ  Mora Catheter: Not present  Central Lines: None  Cardiac Monitoring: None  Code Status: Full Code      Disposition Plan   Expected Discharge: 03/16/2022     Anticipated discharge location:  unclear, did discuss with CM  Delays: NA           The patient's care was discussed with the Patient and Patient's Family.    Gina Parra MD  Hospitalist Service  [facility]    Text page via [email or web address] Paging/Directory         Clinically Significant Risk Factors  "Present on Admission              ______________________________________________________________________    Interval History   ---seen with daughter in room  ---mainly complaining of being \"uncomfortable\" due to bruise on right buttocks  ---eating and drinking ok  ---working on mobility    Data reviewed today: I reviewed all medications, new labs and imaging results over the last 24 hours. .    Physical Exam   Vital Signs: Temp: 98.1  F (36.7  C) Temp src: Oral BP: 107/55 Pulse: 73   Resp: 18 SpO2: 95 % O2 Device: None (Room air)    Weight: 186 lbs 0 oz  General Appearance: Laying in bed, conversational, NAD  Respiratory: CTA-B  Cardiovascular: RRR, no murmers rubs or gallops  GI: soft, nontender, no masses  Skin: bruising noted along right buttocks  Other: No significant infection noted    Data   Recent Labs   Lab 03/15/22  1206 03/15/22  0833 03/15/22  0609 03/14/22  0808 03/14/22  0735 03/13/22 2022 03/13/22  1812   WBC  --   --  6.7  --  11.2*  --  13.3*   HGB  --   --  8.6*  --  10.7*  --  12.7*   MCV  --   --  86  --  87  --  86   PLT  --   --  172  --  276  --  308   NA  --   --  138  --  137  --  136   POTASSIUM  --   --  3.5  --  3.6  --  3.7   CHLORIDE  --   --  103  --  98  --  93*   CO2  --   --  27  --  28  --  27   BUN  --   --  16  --  23*  --  30*   CR  --   --  0.81  --  1.05  --  1.52*   ANIONGAP  --   --  8  --  11  --  16   GLENN  --   --  8.1*  --  9.0  --  9.6   * 101* 108   < > 109   < > 182*   ALBUMIN  --   --   --   --  3.5  --  4.0   PROTTOTAL  --   --   --   --  7.5  --  8.6*   BILITOTAL  --   --   --   --  0.5  --  0.5   ALKPHOS  --   --   --   --  96  --  108   ALT  --   --   --   --  19  --  24   AST  --   --   --   --  21  --  16    < > = values in this interval not displayed.     No results found for this or any previous visit (from the past 24 hour(s)).  "

## 2022-03-15 NOTE — PROGRESS NOTES
This is a neurological consultation      66-year-old admitted to hospital 3/13/2022    Chief complaint  Generalized weakness  Vomiting  Traumatic hematoma right buttocks      HPI  66-year-old with complex vascular medical issues listed in the chart.    Has a history of recurrent CVAs involving right occipital/right parietal hemisphere.  Was hospitalized back in November 3, 2021 had a left carotid endarterectomy 11/4/2021  Readmitted to [facility] 12/18/2021 with confusion MRI scan showed multiple acute/subacute strokes  Right can/right parietal/right frontal  Patient developed seizure activity 12/19/2021 required intubation extubated 12/31/2021  Reintubated due to respiratory distress  1/3/2022 had large burden bilateral acute pulmonary emboli  Difficulty with his tracheostomy which did need to be replaced    Patient's GJ tube removed 2/22/2022    Over the last 5 days prior to admission patient had increasing nausea and vomiting  At difficulty with food getting stuck painful swallowing  Has had difficulty with constipation    Also over the last 4 days has noted increased difficulty with balance  Fell causing a traumatic right buttocks hematoma  Denied losing consciousness with the fall      Per chart patient discharged to home about a week ago from the TCU  Admitted with intermittent nausea vomiting constipation falls dehydration hurt his right elbow and has a hematoma in the right buttocks      Past medical history  CVA   Seizure disorder  Left carotid endarterectomy November 2021  Diabetes  Hypertension  Hyperlipidemia  Pulmonary embolus  Macular degeneration  COVID-19 infection      Habits  Non-smoker  Does not drink alcohol    Family history  Mother with stroke      Work-up  CT scan head 3/13/2022  1.  No definite acute intracranial hemorrhage.  2.  Subtle enhancement associated with the evolving infarct in the right occipital lobe. Redemonstrated chronic right parietal lobe infarct.  Keppra level 52.4  (3/14/2022      Labs  Sodium 138 potassium 3.5  BUN 16 creatinine 0.81  Glucose 108  White blood count 6.7,   Hemoglobin 10.7-8.6 (patient on blood thinner)  Platelets 172,000        Exam  Blood pressure 107/55, pulse 73, temperature 98.1  Blood pressure range 107//77  Pulse range 61-96  T-max 98.7          Review of system      No headache no chest pain no trouble breathing right now  No diplopia dysarthria dysphagia  Feels that his vision is intact  Does have some right buttocks pain  Okay while he is laying down    No new weakness    Otherwise review of systems negative    General exam per primary MD  HEENT no signs of trauma lungs clear  Heart rate regular  Abdomen soft  Does have the right buttock swelling noted by other physicians  Has some right elbow swelling      Neurologic exam  Alert attentive  Normal prosody speech  Normal naming  Normal comprehension  Normal repetition  No aphasia  No neglect  Memory recall okay at bedside testing    Cranial nerves II through XII  No ophthalmoplegia  No nystagmus  Tongue twisters good  No visual field cut could be picked up on bedside testing  Face symmetrical    Upper extremities  Mild weakness left upper extremity compared to right  No actual drift and rapid alternating movements are fairly good    Lower extremities  Mild left leg weakness compared to right  Gets it off the bed and wiggles the toes fairly well even on the left side    Gait  Deferred  Does have the right buttocks pain    Neuro exam stable      Assessment/plan      1.  Admitted with falls nausea vomiting constipation dehydration NATALIA      2.  Right pontine/right parietal occipital stroke November 2021       left ICA endarterectomy November 2021       Complex hospitalization with large PE prolonged intubation cared for at [facility]    3.   Seizures secondary to above December 2021        Keppra level high probably secondary to kidney function being off        appears from outside records  needed to antiepileptics to control his seizures         we will watch level and may trend down if not then we can decide if we adjust doses         Keppra level 52.4 not excessively elevated          Continue current antiepileptic medications      4.    Past risk factors hypertension diabetes hyperlipidemia    5.    Right elbow traumatic olecranon bursitis seen by orthopedic    Neurologic diagnosis  Right occipital stroke November 2021  Right pontine stroke November 2021  Status post left carotid endarterectomy November 2021  Epilepsy secondary to above requiring 2 antiepileptics to control      Home Meds  Atorvastatin 80 mg/day  Aspirin 81 mg/day  Lovenox    Continue  Keppra 750 mg tablet, 2 tabs twice daily  Vimpat 200 mg tablet, 1 tab twice daily    We will let the Keppra level run a bit high may have popped up partially from his dehydration  Seems wide awake and alert not overmedicated    25 minutes total care time today greater than 50% face-to-face patient care team discussing and evaluating the above

## 2022-03-15 NOTE — PLAN OF CARE
Problem: Pain Acute  Goal: Acceptable Pain Control and Functional Ability  Outcome: Ongoing, Progressing  Intervention: Prevent or Manage Pain  Recent Flowsheet Documentation  Taken 3/15/2022 0400 by Brandy Enriquez, RN  Medication Review/Management: medications reviewed  Taken 3/15/2022 0000 by Brandy Enriquez, RN  Medication Review/Management: medications reviewed   Goal Outcome Evaluation:  Patient complained of right buttock contusion pain.  Declined cold pack or heat to area.  Gave tylenol with adequate relief. Slightly unsteady on feet, SBA to and from BR.

## 2022-03-15 NOTE — PROGRESS NOTES
Clinic Care Coordination Contact  [facility]/Voicemail    Reason: Referral to Clinic Care Coordination Service  Referred by: Ashley Narvaez PA-C     Clinical Data: Care Coordinator Outreach:  Outreach attempted x 2: Patient was identified as a potential candidate and referred to Clinic Care Coordination Service. CHW called and no answer. CHW will try again an additional attempt in the next 3-5 business days.   Plan: Care Coordinator will try to reach patient again in 3-5 business days.

## 2022-03-16 NOTE — PLAN OF CARE
Problem: Plan of Care - These are the overarching goals to be used throughout the patient stay.    Goal: Plan of Care Review/Shift Note  Outcome: Ongoing, Progressing   Goal Outcome Evaluation:     SBA of (1) with mobility. PT/OT scheduled. Diabetic diet-100% intake. Blood sugaars WNR. Tylenol for right buttock discomfort.  Sarah Cordon RN

## 2022-03-16 NOTE — PLAN OF CARE
PRIMARY DIAGNOSIS: GENERALIZED WEAKNESS    OUTPATIENT/OBSERVATION GOALS TO BE MET BEFORE DISCHARGE  1. Orthostatic performed: N/A    2. Tolerating PO medications: Yes    3. Return to near baseline physical activity: No    4. Cleared for discharge by consultants (if involved): Yes    Discharge Planner Nurse   Safe discharge environment identified: No  Barriers to discharge: Yes        Entered by: Ranjit Hendricks 03/16/2022 4:57 AM     Pt experiencing pain in right buttocks when ambulating. Declined need for medication or ice. Ambulated to bathroom with standby assist; still somewhat unsteady on feet. Slept well through night.      Ranjit Hendricks, RN    Please review provider order for any additional goals.   Nurse to notify provider when observation goals have been met and patient is ready for discharge.

## 2022-03-16 NOTE — PROGRESS NOTES
This is a neurological consultation      66-year-old admitted to hospital 3/13/2022    Chief complaint  Generalized weakness  Vomiting  Traumatic hematoma right buttocks      HPI  66-year-old with complex vascular medical issues listed in the chart.    Has a history of recurrent CVAs involving right occipital/right parietal hemisphere.  Was hospitalized back in November 3, 2021 had a left carotid endarterectomy 11/4/2021  Readmitted to [facility] 12/18/2021 with confusion MRI scan showed multiple acute/subacute strokes  Right can/right parietal/right frontal  Patient developed seizure activity 12/19/2021 required intubation extubated 12/31/2021  Reintubated due to respiratory distress  1/3/2022 had large burden bilateral acute pulmonary emboli  Difficulty with his tracheostomy which did need to be replaced    Patient's GJ tube removed 2/22/2022    Over the last 5 days prior to admission patient had increasing nausea and vomiting  At difficulty with food getting stuck painful swallowing  Has had difficulty with constipation    Also over the last 4 days has noted increased difficulty with balance  Fell causing a traumatic right buttocks hematoma  Denied losing consciousness with the fall      Per chart patient discharged to home about a week ago from the TCU  Admitted with intermittent nausea vomiting constipation falls dehydration hurt his right elbow and has a hematoma in the right buttocks      Past medical history  CVA   Seizure disorder  Left carotid endarterectomy November 2021  Diabetes  Hypertension  Hyperlipidemia  Pulmonary embolus  Macular degeneration  COVID-19 infection      Habits  Non-smoker  Does not drink alcohol    Family history  Mother with stroke      Work-up  CT scan head 3/13/2022  1.  No definite acute intracranial hemorrhage.  2.  Subtle enhancement associated with the evolving infarct in the right occipital lobe. Redemonstrated chronic right parietal lobe infarct.  Keppra level 52.4  (3/14/2022      Labs  Sodium 138 potassium 3.5  BUN 16 creatinine 0.81  Glucose 108  White blood count 6.7,   Hemoglobin 10.7-8.6-8.2, (patient on blood thinner)  Platelets 172,000        Exam  Blood pressure 119/69, pulse 70, temperature 97.6  Blood pressure range 107//73  Pulse range 70-91  T-max 98.7            Review of system      No headache no chest pain no trouble breathing right now  No diplopia dysarthria dysphagia  Feels that his vision is intact  Does have some right buttocks pain  Okay while he is laying down    No new weakness    Otherwise review of systems negative    General exam per primary MD  HEENT no signs of trauma lungs clear  Heart rate regular  Abdomen soft  Does have the right buttock swelling noted by other physicians  Has some right elbow swelling      Neurologic exam  Alert attentive  Normal prosody speech  Normal naming  Normal comprehension  Normal repetition  No aphasia  No neglect  Memory recall okay at bedside testing    Cranial nerves II through XII  No ophthalmoplegia  No nystagmus  Tongue twisters good  No visual field cut could be picked up on bedside testing  Face symmetrical    Upper extremities  Mild weakness left upper extremity compared to right  No actual drift and rapid alternating movements are fairly good    Lower extremities  Mild left leg weakness compared to right  Gets it off the bed and wiggles the toes fairly well even on the left side    Gait  Has been able to get up and ambulate  Does have the right buttocks pain    Neuro exam stable      Assessment/plan      1.  Admitted with falls nausea vomiting constipation dehydration NATALIA      2.  Right pontine/right parietal occipital stroke November 2021       left ICA endarterectomy November 2021       Complex hospitalization with large PE prolonged intubation cared for at [facility]    3.   Seizures secondary to above December 2021        Keppra level high probably secondary to kidney function being off         appears from outside records needed to antiepileptics to control his seizures         we will watch level and may trend down if not then we can decide if we adjust doses         Keppra level 52.4 not excessively elevated          Continue current antiepileptic medications      4.    Past risk factors hypertension diabetes hyperlipidemia    5.    Right elbow traumatic olecranon bursitis seen by orthopedic    Neurologic diagnosis  Right occipital stroke November 2021  Right pontine stroke November 2021  Status post left carotid endarterectomy November 2021  Epilepsy secondary to above requiring 2 antiepileptics to control      Home Meds  Atorvastatin 80 mg/day  Aspirin 81 mg/day  Lovenox    Continue  Keppra 750 mg tablet, 2 tabs twice daily  Vimpat 200 mg tablet, 1 tab twice daily    We will let the Keppra level run a bit high may have popped up partially from his dehydration  Seems wide awake and alert not overmedicated    Discussed with primary MD face-to-face    Neurology will sign off at this time    22 minutes total care time today greater than 50% face-to-face patient care team.

## 2022-03-16 NOTE — PLAN OF CARE
PRIMARY DIAGNOSIS: GENERALIZED WEAKNESS    OUTPATIENT/OBSERVATION GOALS TO BE MET BEFORE DISCHARGE  1. Orthostatic performed: N/A    2. Tolerating PO medications: Yes    3. Return to near baseline physical activity: No    4. Cleared for discharge by consultants (if involved): Yes    Discharge Planner Nurse   Safe discharge environment identified: No  Barriers to discharge: Yes       Entered by: Carlos Eduardo Felix 03/15/2022 10:55 PM     Please review provider order for any additional goals.   Nurse to notify provider when observation goals have been met and patient is ready for discharge.    Pt alert and oriented. Pt denies pain. Pt able to walk to his bathroom w/ SBA. Denies dizziness/lightheadedness. Pt ambulated to bathroom x 3. Pt was up to his chair for dinner. Pt able to sit up on the side of the bed independently. Pre-prandial BG of 175. HS BG of 226. Call light is within reach.

## 2022-03-16 NOTE — CARE PLAN
PRIMARY DIAGNOSIS: GENERALIZED WEAKNESS    OUTPATIENT/OBSERVATION GOALS TO BE MET BEFORE DISCHARGE  1. Orthostatic performed: N/A    2. Tolerating PO medications: Yes    3. Return to near baseline physical activity: No    4. Cleared for discharge by consultants (if involved): Yes    Discharge Planner Nurse   Safe discharge environment identified: No  Barriers to discharge: Yes - discharge environment needed Entered by: Ranjit Hendricks 03/16/2022 4:04 AM     Please review provider order for any additional goals.   Nurse to notify provider when observation goals have been met and patient is ready for discharge.

## 2022-03-16 NOTE — PROGRESS NOTES
Care Management Follow Up    Length of Stay (days): 0    Expected Discharge Date: 03/17/2022     Concerns to be Addressed:     Discharge planning  Patient plan of care discussed at interdisciplinary rounds: Yes    Anticipated Discharge Disposition:  Medical respite ([facility]) vs home with home care     Anticipated Discharge Services:  Physical therapy, occupational therapy  Anticipated Discharge DME:  To be determined    Education Provided on the Discharge Plan:  Yes, per care team  Patient/Family in Agreement with the Plan:  yes    Referrals Placed by CM/SW:  [facility], Home Care  Private pay costs discussed: Not applicable    Additional Information:  Chart review completed.    9:30 AM - Voice message left for [facility] to return call to [phone].    11:00 AM - Received call from Jeimy ([phone]) with [facility] accepting patient to provide physical therapy and occupational therapy services.  CM to update agency with discharge date and patient destination.    1:15 PM - Call to [facility].  RNCM spoke with Bhavik who states that patient referral/application is being reviewed.  Confirmed contact information with Bhavik ([phone]).  Awaiting response to referral at this time.    Updates to [facility] Provider and Charge RN done.    2:30 PM -  Call to patient's daughter, Bailey, to provide updates done.      Carli Li RN

## 2022-03-16 NOTE — PLAN OF CARE
Goal Outcome Evaluation:                    PRIMARY DIAGNOSIS: GENERALIZED WEAKNESS    OUTPATIENT/OBSERVATION GOALS TO BE MET BEFORE DISCHARGE  1. Orthostatic performed: N/A    2. Tolerating PO medications: Yes    3. Return to near baseline physical activity: Yes    4. Cleared for discharge by consultants (if involved): No    Discharge Planner Nurse   Safe discharge environment identified: yes  Barriers to discharge: Yes therapy placement       Entered by: Sarah Cordon 03/16/2022 2:17 PM     Please review provider order for any additional goals.   Nurse to notify provider when observation goals have been met and patient is ready for discharge.

## 2022-03-16 NOTE — PLAN OF CARE
Problem: Plan of Care - These are the overarching goals to be used throughout the patient stay.    Goal: Plan of Care Review/Shift Note  Outcome: Ongoing, Progressing   Goal Outcome Evaluation:         Right buttock discomfort is managed with tylenol and ice packs. SBA of (1) with mobility. Diabetic diet-100%. FRACNE CANTU.   Sarah Cordon RN

## 2022-03-16 NOTE — PROGRESS NOTES
[facility]    Medicine Progress Note - Hospitalist Service    Date of Admission:  3/13/2022    Assessment & Plan                     Jorge Young is a 66 year old male admitted on 3/13/2022. He has a complex hx of recent complex hospital stay at [facility]. On  11/3/2021 and had left carotid endarterectomy then on 12/18/21 readmitted at [facility] on with confusion and MRI head showed multiple small foci of acute/subacute ischemic injury involving the right can, right parietal lobe, right frontal lobe deep white matter.  Patient had a seizure-like activity on 12/19/2021.  Patient was intubated on 12/20/2021, extubated 12/31/2021 but re intubated the same day due to stridors and respiratory distress.  On 1/3/2022 a CTA chest showed large burden bilateral acute pulmonary embolism including at the bifurcation of the main pulmonary artery, combination of pneumonia and pulmonary infarction with mild to moderate pleural effusions.  Patient was treated with IV heparin for PE and Unasyn for pneumonia and UTI.  He had a tracheostomy placed on 1/10/2022 and discharged to transitional care facility on 1/20/2022.  However, patient had bleeding from tracheostomy site and was readmitted on 1/21/2022 for revision to control bleeding.  Patient had mild recurrent bleed that resolved after silver nitrate catheterization along the left lateral aspect of stoma on 1/25/2022.  He was discharged back to TCU on 1/26/2022 on Lovenox for anticoagulation. His feeding tube was removed by 2/22/22 and was on regular diet since.    He told me he was discharged from TCU a week ago to home alone    He was brought to the emergency department for evaluation of intermittent nausea, vomiting, constipation, falls found to have dehydration, NATALIA, leukocytosis with concerns for trochanteric bursitis and right buttocks hematoma     1.  Failure to thrive/falls  --since home from TCU falling a lot  --ct head no new cva,  evolution of prior cva  --fall risk  --pt/ot rec: TCU versus home with assist  --appreciate neuro consult, continue current seizure meds; discussed with neurology today and they are okay letting the Keppra level run a little high.  --- Neurology signed off 3/16    NATALIA  -resolved with IVF  -resumed arb  -continue to hold hydrochlorothiazide as was dehydrated  ---3/15 did stop IVF  ---stop daily bmp    Hypokalemia - mild, replace per protocol    Right buttock hematoma  ---as ABL anemia from this  ---continuing lovenox  ---added scheduled tylenol today for better pain control  ---recheck hgb stable so will check intermittently    4.N/V   -appears resovled  --likely from constipation  ---no abd pain now, CT negative  ---continue bowel meds    CVD/ CVA  -12/21  -on Lovenox and statin  -s/p CEA surg 11/21  -pt/ot assessment per above  ---neur assessment per above    .DM  -accuchecks controlled on lantus lower dose  -sliding scale insulin  ---A1c 6.2  ---no turlicity for discharge - diabetic ed notes reviewed     Hypertension  -bp controlled   ---did resume arb  ---hold  hydrochlorothiazide      Seizure disorder  - Keppra and Vimpat,   -keppra level slightly high but continue current level per neuro     Trocanteric bursitis  ---appreciate ortho consult; rec; Advised continued mobility and WBAT of the RUE. He may utilize heat, ice, NSAIDS, elevation, and compression (tubigrip sleeve or ACE) to alleviate his pain and discomfort. Would recommend outpatient follow up in approximately 1 week for repeat evaluation. .   -?cellulitis --started ancef but procal low, CRP low and bc ngtd so 3/15 did stop   -xray no fracture    .Hx of PE  -Lovenox stay on this         Diet: Combination Diet Moderate Consistent Carb (60 g CHO per Meal) Diet; Low Saturated Fat Na <2400mg Diet    DVT Prophylaxis: Enoxaparin (Lovenox) SQ  Mora Catheter: Not present  Central Lines: None  Cardiac Monitoring: None  Code Status: Full Code      Disposition  Plan   Expected Discharge: 03/16/2022     Anticipated discharge location:  hoping homeless housing (higher ground)  Delays: NA  Placement - Homecare          The patient's care was discussed with the Patient and Patient's Family.    Gina Parra MD  Hospitalist Service  [facility]    Text page via [facility] Paging/Directory         Clinically Significant Risk Factors Present on Admission              ______________________________________________________________________    Interval History   ---Patient seen in bed.  He understands need for more housing in order to more fully recover.  Will need home care.  --- Feels his pain in his right buttocks hematoma could be better.    Data reviewed today: I reviewed all medications, new labs and imaging results over the last 24 hours. .    Physical Exam   Vital Signs: Temp: 97.6  F (36.4  C) Temp src: Oral BP: 119/69 Pulse: 70   Resp: 18 SpO2: 98 % O2 Device: None (Room air)    Weight: 186 lbs 0 oz  General Appearance: Laying in bed, conversational, NAD  Respiratory: CTA-B  Cardiovascular: RRR, no murmers rubs or gallops  GI: soft, nontender, no masses  Skin: bruising noted along right buttocks, no significant change.  Small area of fluctuance along right elbow also without signs of cellulitis.  Mild induration but no significant warmth.  Approximately 2 to 3 cm in diameter.  No purulence noted  Other: Neurologically grossly intact  - no deficits appreciated    Data   Recent Labs   Lab 03/16/22  0805 03/16/22  0700 03/15/22  2028 03/15/22  0833 03/15/22  0609 03/14/22  0808 03/14/22  0735 03/13/22 2022 03/13/22  1812   WBC  --   --   --   --  6.7  --  11.2*  --  13.3*   HGB  --  8.2*  --   --  8.6*  --  10.7*  --  12.7*   MCV  --   --   --   --  86  --  87  --  86   PLT  --   --   --   --  172  --  276  --  308   NA  --  136  --   --  138  --  137  --  136   POTASSIUM  --  3.4*  --   --  3.5  --  3.6  --  3.7   CHLORIDE  --  104  --   --  103  --  98  --   93*   CO2  --  26  --   --  27  --  28  --  27   BUN  --  12  --   --  16  --  23*  --  30*   CR  --  0.75  --   --  0.81  --  1.05  --  1.52*   ANIONGAP  --  6  --   --  8  --  11  --  16   GLENN  --  8.3*  --   --  8.1*  --  9.0  --  9.6   * 118 226*   < > 108   < > 109   < > 182*   ALBUMIN  --   --   --   --   --   --  3.5  --  4.0   PROTTOTAL  --   --   --   --   --   --  7.5  --  8.6*   BILITOTAL  --   --   --   --   --   --  0.5  --  0.5   ALKPHOS  --   --   --   --   --   --  96  --  108   ALT  --   --   --   --   --   --  19  --  24   AST  --   --   --   --   --   --  21  --  16    < > = values in this interval not displayed.     No results found for this or any previous visit (from the past 24 hour(s)).

## 2022-03-17 NOTE — DISCHARGE INSTRUCTIONS
[facility]  [address] [place] [address]  [phone]    Home Care Services have been arranged for the patient.  Home Care Agency:  [facility]  Home care Agency telephone:  [phone]  Services:  Physical therapy, occupational therapy  Instructions:  Home Care will visit within 48 hours after discharge.  Provider will call you to schedule visit.

## 2022-03-17 NOTE — DISCHARGE SUMMARY
[facility]  Hospitalist Discharge Summary      Date of Admission:  3/13/2022  Date of Discharge:  3/17/2022  Discharging Provider: Gina Parra MD  Discharge Service: Hospitalist Service    Discharge Diagnoses       Failure to thrive; frequent falls, PT recommending home therapy    Mild NATALIA, resolved; discharging off of hydrochlorothiazide    Right buttocks hematoma, secondary to fall    Trochanteric bursitis; consider outpatient orthopedic evaluation in 1 week if not fully resolved.    Constipation with nausea and vomiting, resolved    Cerebrovascular disease    PE on Lovenox    Diabetes mellitus, type II.  Lantus decreased and Trulicity discontinued.  Accu-Cheks stable while here    Hypertension, blood pressure controlled on monotherapy only    Seizure disorder    Follow-ups Needed After Discharge   Follow-up Appointments     Follow-up and recommended labs and tests       Follow up with primary care provider, Ashley Narvaez, within 7 days   to evaluate medication change and for hospital follow- up.  The following   labs/tests are recommended: hgb.         --- Follow-up with [facility] if trochanteric bursitis of right elbow not resolved    Unresulted Labs Ordered in the Past 30 Days of this Admission     Date and Time Order Name Status Description    3/14/2022  6:42 AM Blood Culture Line, venous Preliminary     3/14/2022  6:42 AM Blood Culture Peripheral Blood Preliminary           Discharge Disposition   Discharged to home versus higher ground transitional housing with home PT and OT  Condition at discharge: Stable      Hospital Course                       Jorge Young is a 66 year old male admitted on 3/13/2022 with nausea vomiting and frequent falling at home.  Recent complicated past medical history involving carotid endarterectomy, stroke, seizures eventually requiring intubation as well as PE.  Eventually required tracheostomy and G-tube which are both now resolved.   Was at TCU and discharged approximately 1 to 2 weeks prior to presentation.  Was living in a friend's warehouse where he did have shared bathroom and kitchen but came in due to falling frequency and nausea and vomiting in the setting of constipation.    Patient was found to have mild NATALIA and was treated with IV fluids, pain control for buttocks hematoma, monitor hemoglobin which did not change despite being continued on Lovenox.  Orthopedics saw due to trochanteric bursitis and felt no interventions necessary.  Neurology saw due to frequent falls and felt no change necessary in his seizure medications.  PT recommended patient for home PT OT and social work was working on more stable housing situation along with patient's family who was in agreement with this.  At the time of this dictation unclear if he will go back to his friends for lodging with home care versus to transitional housing with home care.  Overall discharged in good condition with detailed hospital course per below;      1.  Failure to thrive/falls  --since home from TCU falling a lot  --ct head no new cva, evolution of prior cva  --fall risk  --pt/ot rec: Home with home therapy  --appreciate neuro consult, continue current seizure meds; discussed with neurology today and they are okay letting the Keppra level run a little high.  --- Neurology signed off 3/16    NATALIA  -resolved with IVF  -resumed arb  -continue to hold hydrochlorothiazide as was dehydrated; discontinue for discharge as has been normotensive and at risk for dehydration precipitating more constipation.  ---3/15 did stop IVF    Hypokalemia - mild, replaced per protocol    Right buttock hematoma  ---continuing lovenox  ---added scheduled tylenol  for better pain control  ---recheck hgb stable so will check intermittently    N/V   -appears resovled  --likely from constipation  ---no abd pain now, CT negative  ---continue bowel meds    CVD/ CVA  -12/21  -on Lovenox and statin  -s/p CEA surg  11/21  -pt/ot assessment per above  ---neuro assessment per above    .DM  -accuchecks controlled on lantus lower dose  -sliding scale insulin  ---A1c 6.2  ---no turlicity for discharge - diabetic ed notes reviewed     Hypertension  -bp controlled   ---did resume arb  ---hold  hydrochlorothiazide  Seizure disorder  - Keppra and Vimpat,   -keppra level slightly high but continue current level per neuro     Trocanteric bursitis  ---appreciate ortho consult; rec; Advised continued mobility and WBAT of the RUE. He may utilize heat, ice, NSAIDS, elevation, and compression (tubigrip sleeve or ACE) to alleviate his pain and discomfort. Would recommend outpatient follow up in approximately 1 week for repeat evaluation. .   -?cellulitis --started ancef but procal low, CRP low and bc ngtd so 3/15 did stop   -xray no fracture    .Hx of PE  -Lovenox stay on this        Consultations This Hospital Stay   NUTRITION SERVICES ADULT IP CONSULT  CARE MANAGEMENT / SOCIAL WORK IP CONSULT  PHYSICAL THERAPY ADULT IP CONSULT  OCCUPATIONAL THERAPY ADULT IP CONSULT  NEUROLOGY IP CONSULT  PHARMACY IP CONSULT  ORTHOPEDIC SURGERY IP CONSULT  SPEECH LANGUAGE PATH ADULT IP CONSULT  SOCIAL WORK IP CONSULT  SOCIAL WORK IP CONSULT    Code Status   Full Code    Time Spent on this Encounter   I, Gina Parra MD, personally saw the patient today and spent greater than 30 minutes discharging this patient.       Gina Parra MD  [address] [address]  Phone: [phone]  Fax: [phone]  ______________________________________________________________________    Physical Exam   Vital Signs: Temp: 97.9  F (36.6  C) Temp src: Oral BP: 106/63 Pulse: 63   Resp: 20 SpO2: 99 % O2 Device: None (Room air)    Weight: 186 lbs 0 oz  General Appearance: Pleasant, no apparent distress  Respiratory: Clear to auscultation  Cardiovascular: Regular rate and rhythm         Primary Care Physician   Ashley JACK  Brice    Discharge Orders      Home Care Referral      Reason for your hospital stay    Weakness, falling, trochanteric bursitis     Follow-up and recommended labs and tests     Follow up with primary care provider, Ashley Narvaez, within 7 days to evaluate medication change and for hospital follow- up.  The following labs/tests are recommended: hgb.     Activity    Your activity upon discharge: activity as tolerated     Walker Order    DME Documentation:   Describe the reason for need to support medical necessity: gait instability.     I, the undersigned, certify that the above prescribed supplies are medically necessary for this patient and is both reasonable and necessary in reference to accepted standards of medical and necessary in reference to accepted standards of medical practice in the treatment of this patient's condition and is not prescribed as a convenience.     Diet    Follow this diet upon discharge: Orders Placed This Encounter      Combination Diet Moderate Consistent Carb (60 g CHO per Meal) Diet; Low Saturated Fat Na <2400mg Diet       Significant Results and Procedures   Most Recent 3 CBC's:Recent Labs   Lab Test 03/16/22  0700 03/15/22  0609 03/14/22  0735 03/13/22  1812   WBC  --  6.7 11.2* 13.3*   HGB 8.2* 8.6* 10.7* 12.7*   MCV  --  86 87 86   PLT  --  172 276 308     Most Recent 3 BMP's:Recent Labs   Lab Test 03/17/22  1146 03/17/22  0734 03/17/22  0627 03/16/22  2149 03/16/22  2000 03/16/22  1636 03/16/22  1448 03/16/22  0805 03/16/22  0700 03/15/22  0833 03/15/22  0609 03/14/22  0808 03/14/22  0735   NA  --   --   --   --   --   --   --   --  136  --  138  --  137   POTASSIUM  --   --  3.9  --  4.2  --  3.8  --  3.4*  --  3.5  --  3.6   CHLORIDE  --   --   --   --   --   --   --   --  104  --  103  --  98   CO2  --   --   --   --   --   --   --   --  26  --  27  --  28   BUN  --   --   --   --   --   --   --   --  12  --  16  --  23*   CR  --   --   --   --   --   --   --   --   0.75  --  0.81  --  1.05   ANIONGAP  --   --   --   --   --   --   --   --  6  --  8  --  11   GLENN  --   --   --   --   --   --   --   --  8.3*  --  8.1*  --  9.0   * 94  --  182*  --    < >  --    < > 118   < > 108   < > 109    < > = values in this interval not displayed.   ,   Results for orders placed or performed during the hospital encounter of 03/13/22   CT Abdomen Pelvis w Contrast    Narrative    EXAM: CT ABDOMEN PELVIS W CONTRAST  LOCATION: [facility]  DATE/TIME: 3/13/2022 8:06 PM    INDICATION: Bowel obstruction suspected.  COMPARISON: 01/03/2022.  TECHNIQUE: CT scan of the abdomen and pelvis was performed following injection of IV contrast. Multiplanar reformats were obtained. Dose reduction techniques were used.  CONTRAST: Isovue 370 75 mL.    FINDINGS:   LOWER CHEST: There is some mild bibasilar atelectasis with no central airway obstruction. Mild vascular calcification.    HEPATOBILIARY: There is a minute simple appearing cyst seen in the left lobe of liver with no follow-up recommended. Liver, gallbladder, and bile ducts are otherwise unremarkable.    PANCREAS: Normal.    SPLEEN: Normal.    ADRENAL GLANDS: There is a 1.9 cm x 1.5 cm well-circumscribed low density lesion seen in the left adrenal gland with average Hounsfield units at -5, this should be benign and no follow-up is needed. Right adrenal gland is normal.    KIDNEYS/BLADDER: Normal.    BOWEL: Normal.    LYMPH NODES: Normal.    VASCULATURE: Mild to moderate scattered calcifications with no aneurysmal dilatation.    PELVIC ORGANS: Normal.    MUSCULOSKELETAL: Degenerative disc disease at L4 and L5.      Impression    IMPRESSION:   1.  No acute findings seen to explain patient's pain clinically. Given Hounsfield units of left adrenal gland lesion, this should be benign and no follow-up is needed. No significant changes since 01/03/2022.   Head CT w/o contrast    Narrative    EXAM: CT HEAD W/O CONTRAST  LOCATION:  [facility]  DATE/TIME: 3/13/2022 10:57 PM    INDICATION: Fall. Anticoagulated. Evaluate for subdural hemorrhage.  COMPARISON: 01/24/2021.  TECHNIQUE: Routine CT Head without IV contrast. Multiplanar reformats. Dose reduction techniques were used.    FINDINGS: Residual intravascular contrast limits evaluation for subtle hemorrhage.    INTRACRANIAL CONTENTS: No intracranial hemorrhage, extraaxial collection, or mass effect. Redemonstrated infarcts in the right occipital and parietal lobes. There appears to be subtle contrast-enhancement of the evolving infarct in the right occipital   lobe. No new gray-white differentiation loss. Moderate presumed chronic small vessel ischemic changes. Mild generalized volume loss. No hydrocephalus.     VISUALIZED ORBITS/SINUSES/MASTOIDS: No intraorbital abnormality. Trace mucosal thickening of the maxillary sinuses. No middle ear or mastoid effusion.    BONES/SOFT TISSUES: No acute abnormality.      Impression    IMPRESSION:  1.  No definite acute intracranial hemorrhage.  2.  Subtle enhancement associated with the evolving infarct in the right occipital lobe. Redemonstrated chronic right parietal lobe infarct.   XR Elbow Right 2 Views    Narrative    EXAM: XR ELBOW RIGHT 2 VIEWS  LOCATION: [facility]  DATE/TIME: 3/14/2022 12:40 AM    INDICATION: Right elbow pain.  COMPARISON: None.      Impression    IMPRESSION: No fracture, dislocation or significant joint effusion. Soft tissue thickening and edema along the dorsal aspect of the elbow suggests olecranon bursitis and cellulitis. Traumatic contusion in the differential diagnosis.   XR Chest Port 1 View    Narrative    EXAM: XR CHEST PORT 1 VIEW  LOCATION: [facility]  DATE/TIME: 3/14/2022 7:51 AM    INDICATION: 66 y o with leukocytosis, n v, ?aspiration  COMPARISON: 01/25/2022      Impression    IMPRESSION: Tracheostomy has been removed. Lungs are clear.        Discharge Medications   Current Discharge Medication List      CONTINUE these medications which have CHANGED    Details   acetaminophen (TYLENOL) 325 MG tablet Take 3 tablets (975 mg) by mouth 3 times daily    Associated Diagnoses: Acute post-operative pain      LANTUS SOLOSTAR 100 UNIT/ML soln Inject 20 Units Subcutaneous At Bedtime  Qty: 15 mL    Comments: If Lantus is not covered by insurance, may substitute Basaglar or Semglee or other insulin glargine product per insurance preference at same dose and frequency.    Associated Diagnoses: Weakness         CONTINUE these medications which have NOT CHANGED    Details   aspirin 81 MG EC tablet Take 81 mg by mouth daily      atorvastatin (LIPITOR) 80 MG tablet Take 1 tablet (80 mg) by mouth daily  Qty: 90 tablet, Refills: 3    Associated Diagnoses: Mixed hyperlipidemia      enoxaparin ANTICOAGULANT (LOVENOX) 80 MG/0.8ML syringe Inject 80 mg Subcutaneous every 12 hours      famotidine (PEPCID) 20 MG tablet Take 20 mg by mouth 2 times daily as needed       levETIRAcetam (KEPPRA) 750 MG tablet Take 1,500 mg by mouth 2 times daily      losartan (COZAAR) 50 MG tablet Take 1 tablet (50 mg) by mouth daily  Qty: 90 tablet, Refills: 3    Associated Diagnoses: Uncontrolled type 2 diabetes mellitus with hyperglycemia (H); Acute embolic stroke (H); Essential hypertension      polyethylene glycol (MIRALAX) 17 g packet Take 1 packet by mouth 2 times daily      polyethylene glycol-propylene glycol (SYSTANE ULTRA) 0.4-0.3 % SOLN ophthalmic solution Place 1 drop into both eyes every hour as needed for dry eyes      sertraline (ZOLOFT) 25 MG tablet Take 25 mg by mouth daily      VIMPAT 200 MG TABS tablet Take 200 mg by mouth 2 times daily      ACCU-CHEK GUIDE test strip Use to test blood sugar 1 time daily.  Qty: 50 strip, Refills: 11    Associated Diagnoses: Uncontrolled type 2 diabetes mellitus with hyperglycemia (H)      blood glucose monitoring (SOFTCLIX) lancets Use to test  blood sugar 1 time daily.  Qty: 100 each, Refills: 4    Associated Diagnoses: Uncontrolled type 2 diabetes mellitus with hyperglycemia (H)      Blood Glucose Monitoring Suppl (ACCU-CHEK GUIDE ME) w/Device KIT 1 each daily  Qty: 1 kit, Refills: 0    Associated Diagnoses: Uncontrolled type 2 diabetes mellitus with hyperglycemia (H)      Continuous Blood Gluc Sensor (FREESTYLE MICHELLE 2 SENSOR) MISC 1 each every 14 days 1 each every 14 days. Change every 14 days.  Qty: 2 each, Refills: 5    Associated Diagnoses: Acute CVA (cerebrovascular accident) (H); Type 2 diabetes mellitus with both eyes affected by moderate nonproliferative retinopathy and macular edema, without long-term current use of insulin (H)         STOP taking these medications       hydrochlorothiazide (HYDRODIURIL) 50 MG tablet Comments:   Reason for Stopping:         TRULICITY 0.75 MG/0.5ML pen Comments:   Reason for Stopping:             Allergies   Allergies   Allergen Reactions     Novocain [Procaine] Other (See Comments)     Tiredness

## 2022-03-17 NOTE — PROGRESS NOTES
[facility]    Medicine Progress Note - Hospitalist Service    Date of Admission:  3/13/2022    Assessment & Plan                     Jorge Young is a 66 year old male admitted on 3/13/2022. He has a complex hx of recent complex hospital stay at [facility]. On  11/3/2021 and had left carotid endarterectomy then on 12/18/21 readmitted at [facility] on with confusion and MRI head showed multiple small foci of acute/subacute ischemic injury involving the right can, right parietal lobe, right frontal lobe deep white matter.  Patient had a seizure-like activity on 12/19/2021.  Patient was intubated on 12/20/2021, extubated 12/31/2021 but re intubated the same day due to stridors and respiratory distress.  On 1/3/2022 a CTA chest showed large burden bilateral acute pulmonary embolism including at the bifurcation of the main pulmonary artery, combination of pneumonia and pulmonary infarction with mild to moderate pleural effusions.  Patient was treated with IV heparin for PE and Unasyn for pneumonia and UTI.  He had a tracheostomy placed on 1/10/2022 and discharged to transitional care facility on 1/20/2022.  However, patient had bleeding from tracheostomy site and was readmitted on 1/21/2022 for revision to control bleeding.  Patient had mild recurrent bleed that resolved after silver nitrate catheterization along the left lateral aspect of stoma on 1/25/2022.  He was discharged back to TCU on 1/26/2022 on Lovenox for anticoagulation. His feeding tube was removed by 2/22/22 and was on regular diet since.    He told me he was discharged from TCU a week ago to home alone    He was brought to the emergency department for evaluation of intermittent nausea, vomiting, constipation, falls found to have dehydration, NATALIA, leukocytosis with concerns for trochanteric bursitis and right buttocks hematoma     1.  Failure to thrive/falls  --since home from TCU falling a lot  --ct head no new cva,  evolution of prior cva  --fall risk  --pt/ot rec: TCU versus home with assist  --appreciate neuro consult, continue current seizure meds; discussed with neurology today and they are okay letting the Keppra level run a little high.  --- Neurology signed off 3/16    NATALIA  -resolved with IVF  -resumed arb  -continue to hold hydrochlorothiazide as was dehydrated  ---3/15 did stop IVF  ---stop daily bmp    Hypokalemia - mild, replace per protocol    Right buttock hematoma  ---as ABL anemia from this  ---continuing lovenox  ---added scheduled tylenol today for better pain control  ---recheck hgb stable so will check intermittently    4.N/V   -appears resovled  --likely from constipation  ---no abd pain now, CT negative  ---continue bowel meds    CVD/ CVA  -12/21  -on Lovenox and statin  -s/p CEA surg 11/21  -pt/ot assessment per above  ---neur assessment per above    .DM  -accuchecks controlled on lantus lower dose  -sliding scale insulin  ---A1c 6.2  ---no turlicity for discharge - diabetic ed notes reviewed     Hypertension  -bp controlled   ---did resume arb  ---hold  hydrochlorothiazide      Seizure disorder  - Keppra and Vimpat,   -keppra level slightly high but continue current level per neuro     Trocanteric bursitis  ---appreciate ortho consult; rec; Advised continued mobility and WBAT of the RUE. He may utilize heat, ice, NSAIDS, elevation, and compression (tubigrip sleeve or ACE) to alleviate his pain and discomfort. Would recommend outpatient follow up in approximately 1 week for repeat evaluation. .   -?cellulitis --started ancef but procal low, CRP low and bc ngtd so 3/15 did stop   -xray no fracture    .Hx of PE  -Lovenox stay on this         Diet: Combination Diet Moderate Consistent Carb (60 g CHO per Meal) Diet; Low Saturated Fat Na <2400mg Diet    DVT Prophylaxis: Enoxaparin (Lovenox) SQ  Mora Catheter: Not present  Central Lines: None  Cardiac Monitoring: None  Code Status: Full Code      Disposition  Plan   Expected Discharge: 03/17/2022     Anticipated discharge location:  hoping homeless housing ([place])  Delays: NA  Other (Add Comment)          The patient's care was discussed with the Patient and Patient's Family.    Gina Parra MD  Hospitalist Service  [facility]    Text page via [employer] Paging/Directory         Clinically Significant Risk Factors Present on Admission              ______________________________________________________________________    Interval History   ---Patient seen in bed.  He understands need for more housing in order to more fully recover.  Will need home care.  --- Feels his pain in his right buttocks hematoma could be better.    Data reviewed today: I reviewed all medications, new labs and imaging results over the last 24 hours. .    Physical Exam   Vital Signs: Temp: 98.8  F (37.1  C) Temp src: Oral BP: 121/70 Pulse: 70   Resp: 20 SpO2: 100 % O2 Device: None (Room air)    Weight: 186 lbs 0 oz  General Appearance: Laying in bed, conversational, NAD  Respiratory: CTA-B  Cardiovascular: RRR, no murmers rubs or gallops  GI: soft, nontender, no masses  Skin: bruising noted along right buttocks, no significant change.  Small area of fluctuance along right elbow also without signs of cellulitis.  Mild induration but no significant warmth.  Approximately 2 to 3 cm in diameter.  No purulence noted  Other: Neurologically grossly intact  - no deficits appreciated    Data   Recent Labs   Lab 03/17/22  0734 03/17/22  0627 03/16/22  2149 03/16/22  2000 03/16/22  1636 03/16/22  1448 03/16/22  0805 03/16/22  0700 03/15/22  0833 03/15/22  0609 03/14/22  0808 03/14/22  0735 03/13/22 2022 03/13/22  1812   WBC  --   --   --   --   --   --   --   --   --  6.7  --  11.2*  --  13.3*   HGB  --   --   --   --   --   --   --  8.2*  --  8.6*  --  10.7*  --  12.7*   MCV  --   --   --   --   --   --   --   --   --  86  --  87  --  86   PLT  --   --   --   --   --   --   --   --    --  172  --  276  --  308   NA  --   --   --   --   --   --   --  136  --  138  --  137  --  136   POTASSIUM  --  3.9  --  4.2  --  3.8  --  3.4*  --  3.5  --  3.6  --  3.7   CHLORIDE  --   --   --   --   --   --   --  104  --  103  --  98  --  93*   CO2  --   --   --   --   --   --   --  26  --  27  --  28  --  27   BUN  --   --   --   --   --   --   --  12  --  16  --  23*  --  30*   CR  --   --   --   --   --   --   --  0.75  --  0.81  --  1.05  --  1.52*   ANIONGAP  --   --   --   --   --   --   --  6  --  8  --  11  --  16   GLENN  --   --   --   --   --   --   --  8.3*  --  8.1*  --  9.0  --  9.6   GLC 94  --  182*  --  212*  --    < > 118   < > 108   < > 109   < > 182*   ALBUMIN  --   --   --   --   --   --   --   --   --   --   --  3.5  --  4.0   PROTTOTAL  --   --   --   --   --   --   --   --   --   --   --  7.5  --  8.6*   BILITOTAL  --   --   --   --   --   --   --   --   --   --   --  0.5  --  0.5   ALKPHOS  --   --   --   --   --   --   --   --   --   --   --  96  --  108   ALT  --   --   --   --   --   --   --   --   --   --   --  19  --  24   AST  --   --   --   --   --   --   --   --   --   --   --  21  --  16    < > = values in this interval not displayed.     No results found for this or any previous visit (from the past 24 hour(s)).

## 2022-03-17 NOTE — PROGRESS NOTES
Care Management Follow Up    Length of Stay (days): 0    Expected Discharge Date: 03/17/2022     Concerns to be Addressed:  Discharge placement     Patient plan of care discussed at interdisciplinary rounds: Yes    Anticipated Discharge Disposition: Medical Respite vs Home     Anticipated Discharge Services:  Home care - physical therapy, occupational therapy  Anticipated Discharge DME:  Walker (per therapy), glucometer (per Diabetic Educator)     Education Provided on the Discharge Plan:  Yes, per care team  Patient/Family in Agreement with the Plan:  Yes    Referrals Placed by CM/SW:  Home Care  Private pay costs discussed: Not applicable    Additional Information:  Chart review completed.  RNCM seeking discharge placement with [facility] for patient.    Writer called [facility] and spoke with Se REYNAGA.  Se states that facility has not made a decision yet.  He will discuss with  and get back to .    Received call from Jeimy with [facility] inquiring on discharge date and destination.  Informed that patient will discharge today but destination is not final yet.  CM will update later this afternoon.     Called patient's daughter, Bailey, to provide update on status of discharge placement and that patient is medically ready for discharge today.  Bailey states she will pick patient up after work today.  She should arrive to the hospital by 4:00 PM.    Discussed discharge plan with [facility] updated.    2:30 PM - Called [facility] and left voice message requesting call back to [phone].    3:40 PM - Received call back from Se REYNAGA at [facility].  Facility will accept patient today, would like patient to arrive by 5:00 PM - 6:00 PM.  Discharge orders and Summary faxed to [phone].    Provided patient and daughter with AVS with destination address and phone number.    3:50 PM - Called [facility] to notify of  patient discharge destination to [facility].  Discharge orders faxed to home care agency.      Carli Li RN

## 2022-03-17 NOTE — PLAN OF CARE
PRIMARY DIAGNOSIS: GENERALIZED WEAKNESS    OUTPATIENT/OBSERVATION GOALS TO BE MET BEFORE DISCHARGE  1. Orthostatic performed: N/A    2. Tolerating PO medications: Yes    3. Return to near baseline physical activity: Yes    4. Cleared for discharge by consultants (if involved): No    Discharge Planner Nurse   Safe discharge environment identified: Yes  Barriers to discharge: Yes-placement       Entered by: Sarah Cordon 03/17/2022 11:51 AM     Please review provider order for any additional goals.   Nurse to notify provider when observation goals have been met and patient is ready for discharge.Goal Outcome Evaluation:

## 2022-03-17 NOTE — PLAN OF CARE
Physical Therapy Discharge Summary    Reason for therapy discharge:    Discharged to home with home therapy.    Progress towards therapy goal(s). See goals on Care Plan in [facility] electronic health record for goal details.  Goals partially met.  Barriers to achieving goals:   discharge from facility.    Therapy recommendation(s):    Continued therapy is recommended.  Rationale/Recommendations:  Continue with home PT as pt is progressing towards goals..

## 2022-03-17 NOTE — PLAN OF CARE
Occupational Therapy Discharge Summary    Reason for therapy discharge:    All goals and outcomes met, no further needs identified.    Progress towards therapy goal(s). See goals on Care Plan in [facility] electronic health record for goal details.  Goals met    Therapy recommendation(s):    No further therapy is recommended.    Goal Outcome Evaluation:

## 2022-03-17 NOTE — PROGRESS NOTES
Discharge paperwork reviewed with patient and all questions have been answered. Educated patient on the change of his insulin dose. IV discontinued. Patient stable. Daughter to transport patient back to his living arrangement at 1600.   Sarah Cordon RN

## 2022-03-18 NOTE — TELEPHONE ENCOUNTER
PRIOR AUTHORIZATION DENIED    Medication: FREESTYLE MICHELLE 2 SENSOR    Denial Date: 3/18/2022    Denial Rational: NOT COVERED BY MEDICARE D PLAN         Appeal Information:   We have routed the request to submit through medical insurance.  The [facility] specialst will be in touch with the outcome of this submission.  Please close encounter when finished.

## 2022-03-18 NOTE — TELEPHONE ENCOUNTER
The pt's insurance is not covering the Dexcom G6 Sensor and the Freestyle [place]ino 2 [place] Device, please start a PA.

## 2022-03-18 NOTE — TELEPHONE ENCOUNTER
PRIOR AUTHORIZATION DENIED    Medication: FREESTYLE MICHELLE 2 READER    Denial Date: 3/18/2022    Denial Rational: NOT COVERED UNDER MEDICARE PART D    APPEAL :   We have routed the request to submit through medical insurance.  The [facility] specialst will be in touch with the outcome of this submission.  Please close encounter when finished.

## 2022-03-18 NOTE — PROGRESS NOTES
"Care Management Addendum:    3/18 @ 9:57 AM    Received notification that patient did not arrive to [facility] last evening.    Called patient's daughter, Bailey ([phone]).  Bailey states, \"We arrived at the place but I could not leave my dad there\".  She states she told staff he was not going to stay.  Daughter took patient back to his warehouse apartment.  She states, \"We will figure out a different plan\".    RNCM called [facility] and spoke with Se REYNAGA to give update.    RNCM called [facility] to give update on patient location. Writer spoke with Jeimy who states that home care will see patient today.    Carli Li RN, Care Manager  "

## 2022-03-18 NOTE — TELEPHONE ENCOUNTER
PA Initiation    Medication: FREESTYLE MICHELLE 2 SENSOR  Insurance Company:    Pharmacy Filling the Rx: [facility] - [place], [place] - [address]  Filling Pharmacy Phone: [phone]  Filling Pharmacy Fax:    Start Date: 3/18/2022

## 2022-03-18 NOTE — TELEPHONE ENCOUNTER
PA Initiation    Medication: FREESTYLE MICHELLE 2 READER  Insurance Company:    Pharmacy Filling the Rx: [facility] - [place], [place] - [address]  Filling Pharmacy Phone: [phone]  Filling Pharmacy Fax:    Start Date: 3/18/2022

## 2022-03-22 NOTE — TELEPHONE ENCOUNTER
Reason for Call:  Medication or medication refill:    Do you use a [facility]?  Name of the pharmacy and phone number for the current request:  [facility]     Name of the medication requested:   VIMPAT 200 MG TABS tablet    Other request: Pt's daughter, Faviola (c2c on file) called say pt is out of this med and in need of med asap for seziures. Please look into this request and if approved for refill please send order to pharmacy. Thank you.    Can we leave a detailed message on this number? YES    Phone number patient can be reached at: Home # [phone]    Best Time: anytime     Call taken on 3/22/2022 at 12:16 PM by Yady Perikns

## 2022-03-22 NOTE — TELEPHONE ENCOUNTER
Daughter calling to check status of refill her Dad is out and she would like to get it authorized today please

## 2022-03-22 NOTE — TELEPHONE ENCOUNTER
Bailey, patient daughter calling. Consent to communicate on file.   Patient is out of his seizure medication Vimpat 200 mg tablets that he takes twice a day. Patient has not taken any today. Daughter reports recent hospitalization where he was put into a coma to prevent the seizures.     Medication is listed as patient reported in chart. Review of [facility] discharge paperwork shows:    Medications at Time of Discharge  - documented as of this encounter  lacosamide (VIMPAT) 10 mg/mL    Indications: tonic-clonic epilepsy treatment adjunct Administer 20 mL (200 mg) via G-tube 2 times daily.   0 01/18/2022           Page to on call provider to see about refill of medication to [facility] pharmacy.   Page to Dr. Maninder Valencia at 5:28 pm.   Second page to Dr. Maninder Valencia at 5:48 pm.   Bryanna Bartholomew RN   03/22/22 6:03 PM  [facility] Nurse Advisor

## 2022-03-22 NOTE — PROGRESS NOTES
Clinic Care Coordination Contact  Community Health Worker Initial Outreach    Reason: Referral to Clinic Care Coordination Service  Referred by: Ashley Narvaez PA-C     Clinical Data: Care Coordinator Outreach:  Outreach attempted x 3: Patient was identified as a potential candidate and referred to Clinic Care Coordination Service. I call and spoke with patient today, 3- to discuss possible clinic care coordination enrollment. Have introduced myself to patient. Clinic care coordination service was described to the patient and immediate needs were discussed. The patient has declined participating into clinic care coordination service at this time. I will discontinue outreach to the patient at this time. I have notified the patient s physician by task. If the provider feels this patient is a good fit in the future I have asked them to resend to the CCC referral bucket. I have also provided the patient with my contact information should they change their mind.     Patient accepts CC: No, patient reported that he has no needs or concerns at this time.     Plan:   No further action need from CCC team at this time.  Message route to updates PCP/referred provider.

## 2022-03-25 NOTE — TELEPHONE ENCOUNTER
----- Message from Ashley Narvaez PA-C sent at 3/25/2022  1:38 PM CDT -----  His hemoglobin is still a little low, but better than when he was in the hospital.  We can re-check this in the future.  Normal kidney tests.  His potassium is a little high.  It would be good to re-check this next week.  Does he have a nurse coming out to the house that can draw blood?

## 2022-03-25 NOTE — TELEPHONE ENCOUNTER
Reason for Call:  Medication or medication refill:    Do you use a [facility]?  Name of the pharmacy and phone number for the current request:  [facility]    Name of the medication requested: Levonox. Caller is asking, since pt only has 2 shot left, will they be getting a refill, or is pt switching over to Pill form?     Can we leave a detailed message on this number? YES    Phone number patient can be reached at: Other phone number:  [phone]     Best Time: ANY    Call taken on 3/25/2022 at 1:09 PM by Mehreen Vu

## 2022-03-25 NOTE — PROGRESS NOTES
Subjective:    Jorge Young is a 66 year old male who presents with chief complaint of hospital discharge follow-up.  I saw him for previous hospital discharge follow-up on 3/9/2022.  Was hospitalized for stroke and sequelae and complications at that time.  He had been doing better at the hospital follow-up visit.  Please see the note from 3/9/2022 for details.  Multiple things were being addressed, and in the process of being addressed.  Before we could make progress on those things, he became weak and fell at home.  Also apparently had severe constipation, though he denied that when I saw him.  He was admitted to [facility] on 3/13/2022, discharged on 3/17/2022.  He is here today with his son.  He is here for hospital follow-up.  Medications reconciled.  Hydrochlorothiazide and Trulicity were stopped at the hospital.  He has not been taking these.  He does need follow-up labs.  He is to follow-up on his elbow bursitis along with numerous other concerns.    I reviewed hospital discharge summary from 3/17/2022 today.      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     Syncope, unspecified syncope type     History of seizure     Aphasia     History of embolic stroke     Acute encephalopathy     Left facial numbness     Left hand weakness     Atherosclerosis of left carotid artery     Acute CVA (cerebrovascular accident) (H)     Symptomatic carotid artery stenosis with infarction (H)     Seizures (H)     Status epilepticus (H)     Saddle pulmonary embolus (H)      Hyponatremia     Hemorrhage from tracheostomy stoma (H)     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 seizures (H)     Acute respiratory failure (H)     Acute renal insufficiency     Cerebral infarction (H)     Acute cystitis     Acute blood loss anemia     Hospital discharge follow-up     Weakness     Adult failure to thrive     NATALIA (acute kidney injury) (H)       Current Outpatient Medications:      acetaminophen (TYLENOL) 325 MG tablet, Take 3 tablets (975 mg) by mouth 3 times daily, Disp: , Rfl:      aspirin 81 MG EC tablet, Take 81 mg by mouth daily, Disp: , Rfl:      atorvastatin (LIPITOR) 80 MG tablet, Take 1 tablet (80 mg) by mouth daily, Disp: 90 tablet, Rfl: 3     blood glucose (NO BRAND SPECIFIED) lancets standard, To use to test glucose level in the blood Use to test blood sugar  2  times daily as directed. To accompany glucose monitor brands per insurance coverage., Disp: 100 each, Rfl: 0     blood glucose (NO BRAND SPECIFIED) test strip, To use to test glucose level in the blood Use to test blood sugar  2  times daily as directed. Check when you get up in the morning and one hour after your biggest meal of the day. To accompany glucose monitor brands per insurance coverage., Disp: 100 strip, Rfl: 0     blood glucose monitoring (NO BRAND SPECIFIED) meter device kit, Use as directed Per insurance coverage, Disp: 1 kit, Rfl: 0     Continuous Blood Gluc Sensor (FREESTYLE MICHELLE 2 SENSOR) [facility], 1 each every 14 days 1 each every 14 days. Change every 14 days., Disp: 2 each, Rfl: 5     enoxaparin ANTICOAGULANT (LOVENOX) 80 MG/0.8ML syringe, Inject 80 mg Subcutaneous every 12 hours, Disp: , Rfl:      famotidine (PEPCID) 20 MG tablet, Take 20 mg by mouth 2 times daily as needed , Disp: , Rfl:      Green Tea, Calista sinensis, (GREEN TEA EXTRACT) 150 MG CAPS, , Disp: , Rfl:      lacosamide (VIMPAT) 200 MG TABS tablet, Take 1 tablet (200 mg) by mouth 2 times daily, Disp: 180 tablet, Rfl: 3      LANTUS SOLOSTAR 100 UNIT/ML soln, Inject 20 Units Subcutaneous At Bedtime, Disp: 15 mL, Rfl: 0     levETIRAcetam (KEPPRA) 750 MG tablet, Take 1,500 mg by mouth 2 times daily, Disp: , Rfl:      losartan (COZAAR) 50 MG tablet, Take 1 tablet (50 mg) by mouth daily (Patient taking differently: Take 100 mg by mouth daily ), Disp: 90 tablet, Rfl: 3     Multiple Vitamins-Minerals (MULTIVITAMIN & MINERAL PO), , Disp: , Rfl:      polyethylene glycol (MIRALAX) 17 g packet, Take 1 packet by mouth 2 times daily, Disp: , Rfl:      polyethylene glycol-propylene glycol (SYSTANE ULTRA) 0.4-0.3 % SOLN ophthalmic solution, Place 1 drop into both eyes every hour as needed for dry eyes, Disp: , Rfl:      sertraline (ZOLOFT) 25 MG tablet, Take 25 mg by mouth daily, Disp: , Rfl:       Objective:   Allergies:  Novocain [procaine]    Vitals:  Vitals:    03/24/22 1551   BP: 123/80   Pulse: 92   Temp: 97.3  F (36.3  C)   SpO2: 100%   Weight: 86.3 kg (190 lb 4 oz)       Body mass index is 29.8 kg/m .    Vital signs reviewed.  General: Patient is alert and oriented x 3, in no apparent distress appropriately groomed, normal affect, answers questions appropriately  Cardiac: regular rate and rhythm, no murmurs  Pulmonary: lungs clear to auscultation bilaterally, no crackles, rales, rhonchi, or wheezing noted  Musculoskeletal: Right elbow has mild to moderate edema and mild erythema    Results for orders placed or performed in visit on 03/24/22   Hemoglobin     Status: Abnormal   Result Value Ref Range    Hemoglobin 9.3 (L) 13.3 - 17.7 g/dL     Other labs pending.    Assessment and Plan:   1. Hospital discharge follow-up  2. Generalized muscle weakness  3. Constipation, unspecified constipation type  Medications reconciled.  He is doing better.  Constipation improved.  He does have PT/OT to improve strength and balance.  He has a walker at home.  Screening labs ordered for follow-up and I will review those results when available.  Hospitalist had  wanted him to stay with family, given his above problems.  However he has moved back into his own residence and is living by himself.  He thinks he is doing fine.  Social work had found him a residence, but it was a group home and he did not want to stay there, see HPI.  Son thinking about buying a house where, where patient could potentially stay with his son.  Also wants to get all of his medicines on 3-month supply synced up so they only have to go to the pharmacy every 3 months or so  Other related concerns including:  -high hospital bill the patient is not able to pay  -expensive monthly medications.  He is wondering if he should switch insurance.  We will have someone contact him regarding both of these concerns.  Will continue to work on above concerns and update patient next week.  - Hemoglobin; Future  - Basic metabolic panel  - Hemoglobin    4. Acute embolic stroke (H)  Does not appear to have had a new stroke that necessitated most recent hospitalization.  He is having PT and OT at home to help with mobility and strength.  Continue other present medications as above.  He is currently on enoxaparin injections.  He wants to switch to oral medication because injections are difficult, given strength and coordination issues.  Previous notes from anticoagulation specialists state that he was on anticoagulation for a PE, so they would be okay with him going to oral medication.  Recommended 3 to 6 months.  I will follow-up with their recommendations.  He does have enough Lovenox for probably 1 more month.  He will continue to use that for now, until everything else is set up.  - Hemoglobin; Future  - Basic metabolic panel  - Hemoglobin    5. Primary hypertension  Screening labs ordered and I will follow up with results.  Only on losartan.  He got hydrochlorothiazide stopped at the hospital due to concerns for dehydration.  Blood pressure remains under good control.  Continue to monitor.  - Hemoglobin; Future  -  Basic metabolic panel  - Hemoglobin    6. Type 2 diabetes mellitus with both eyes affected by moderate nonproliferative retinopathy and macular edema, without long-term current use of insulin (H)  A1c in the hospital 6.2%.  Only diabetes medicine he is taking his 20 units of insulin before bed.  Has stopped other diabetes medicines at recommendation of hospitalist.  Blood sugars have been a little high recently.  He is unable to check his sugars using a finger poke.  I think he would benefit from freestyle vincent meter.  Order will be placed for him to get that.  Continue to monitor.  - Hemoglobin; Future  - Basic metabolic panel  - Hemoglobin    7.  Right elbow swelling  Hospitalist felt this was bursitis.  Ortho consult at the hospital, managed expectantly.  Patient notes still painful, but improving and range of motion improving.  He declines Ortho referral at this time.  If he does not continue to improve, he will contact us next week and we can make referral then.    Also wants to know if he can restart green tea, Marigold, wildflower, saffron.  Will review and update him next week.    37 minutes spent on the date of the encounter doing chart review, history and exam, and documentation.      This dictation uses voice recognition software, which may contain typographical errors.

## 2022-03-25 NOTE — TELEPHONE ENCOUNTER
It will take several days to get everything in place to switch him to the oral pills.  I don't want him to go without, so I will renew this for him to use for the next week or so.  We will keep him updated about the oral medication.

## 2022-03-28 PROBLEM — K59.00 CONSTIPATION, UNSPECIFIED CONSTIPATION TYPE: Status: ACTIVE | Noted: 2022-01-01

## 2022-03-28 PROBLEM — M62.81 GENERALIZED MUSCLE WEAKNESS: Status: ACTIVE | Noted: 2022-01-01

## 2022-03-28 PROBLEM — M25.521 RIGHT ELBOW PAIN: Status: ACTIVE | Noted: 2022-01-01

## 2022-03-29 ENCOUNTER — TELEPHONE (OUTPATIENT)
Dept: FAMILY MEDICINE | Facility: CLINIC | Age: 67
End: 2022-03-29
Payer: COMMERCIAL

## 2022-03-31 DIAGNOSIS — Z53.9 DIAGNOSIS NOT YET DEFINED: Primary | ICD-10-CM

## 2022-03-31 PROCEDURE — G0180 MD CERTIFICATION HHA PATIENT: HCPCS | Performed by: PHYSICIAN ASSISTANT

## 2022-03-31 NOTE — TELEPHONE ENCOUNTER
Please advise, this has been previously denied by patient's Part D medicare pharmacy benefit plan.     Please see below for clinic on information on how to acquire the CGM through patient's medical benefits Part B.  Patient's plan has specific DME vendors [employer] or [employer].  Phone numbers listed for each vendor below.     Please refer back to clinic staff for Part B requests

## 2023-09-21 NOTE — PROGRESS NOTES
Nutrition Note    Received nutrition consult.  RD cannot consult on observation patients or outpatients in beds d/t licensure issues and because of CMS conditions of participation.  RD will monitor for any status changes and will complete consult if patient changes to inpatient status.  Thank you.     No

## (undated) DEVICE — CUSTOM PACK CAROTID ENDARTERECTOMY PR

## (undated) DEVICE — WIPE MICRO-TIP GRAPHIC CONTROL 3300

## (undated) DEVICE — SOL WATER IRRIG 1000ML BOTTLE 2F7114

## (undated) DEVICE — DRAIN RESERVOIR 100ML JP 0070740

## (undated) DEVICE — SUCTION MANIFOLD NEPTUNE 2 SYS 1 PORT 702-025-000

## (undated) DEVICE — ESU PENCIL SMOKE EVAC W/ROCKER SWITCH 0703-047-000

## (undated) DEVICE — NEEDLE HYPO 25X1 SAFETY 305916

## (undated) DEVICE — SHUNT CAROTID ARGYLE

## (undated) DEVICE — SU MONOCRYL+ 4-0 18IN PS2 UND MCP496G

## (undated) DEVICE — Device

## (undated) DEVICE — GOWN SURGICAL SMARTGOWN 2XL 89075

## (undated) DEVICE — SU PROLENE 6-0 BV-1DA 18" 8709H

## (undated) DEVICE — COVER ULTRASOUND PROBE FLEXI-FEEL 4X96" 25-FF496

## (undated) DEVICE — DRSG TELFA 3X4" 1050

## (undated) DEVICE — DRSG TEGADERM 4X4 3/4" 1626W

## (undated) DEVICE — SUTURE SILK 0 TIES 30IN SA86G

## (undated) DEVICE — CONTAINER URINE SPEC 4OZ STRL 1053

## (undated) DEVICE — SPONGE RAY-TEC 4X8" 7318

## (undated) DEVICE — DECANTER BAG 2002S

## (undated) DEVICE — IOM STANDARD SUPPLY FEE

## (undated) DEVICE — SU PROLENE 7-0 BV-1DA 18" 8701H

## (undated) DEVICE — SU ETHILON 3-0 PS-2 18" 1669H

## (undated) DEVICE — SUTURE VICRYL+ 2-0 27IN SH UND VCP417H

## (undated) DEVICE — IOM CASE FLAT FEE

## (undated) DEVICE — SU PROLENE 6-0 C-1DA 18" M8718

## (undated) DEVICE — DECANTER VIAL 2006S

## (undated) DEVICE — SU VICRYL+ 3-0 27IN SH UND VCP416H

## (undated) DEVICE — LIGACLIP MEDIUM AESCULAP B2180-1

## (undated) DEVICE — NEEDLE HYPO 22X1-1/2 SAFETY 305900

## (undated) DEVICE — PREP CHLORAPREP W/ORANGE TINT 10.5ML 930715

## (undated) DEVICE — DRSG GAUZE 2X2" 8042

## (undated) DEVICE — DRAPE ISOLATION BAG 1003

## (undated) DEVICE — DRAIN FLAT 10MM FULL PERF SIL 0070440

## (undated) DEVICE — GLOVE BIOGEL PI ULTRATOUCH G SZ 8.0 42180

## (undated) DEVICE — GLOVE BIOGEL PI SZ 8.0 40880

## (undated) DEVICE — SOL NACL 0.9% IRRIG 1000ML BOTTLE 2F7124

## (undated) DEVICE — ADH SKIN CLOSURE PREMIERPRO EXOFIN 1.0ML 3470

## (undated) DEVICE — MAT INST MAGNETIC 16X20

## (undated) DEVICE — LIGACLIP SMALL AESCULAP J1180-1

## (undated) DEVICE — SOL NACL 0.9% INJ 250ML BAG 2B1322Q

## (undated) DEVICE — GLOVE SURG PI ULTRA TOUCH M SZ 7 LF 42670

## (undated) DEVICE — PLATE GROUNDING ADULT W/CORD 9165L

## (undated) DEVICE — GLOVE UNDER INDICATOR PI SZ 7.0 LF 41670

## (undated) DEVICE — SU PROLENE 5-0 RB-1DA 18"8756H

## (undated) DEVICE — COVER ULTRASOUND PROBE STRL 9001C0197

## (undated) DEVICE — TOWEL SURG 16INW X 26INL WHITE STRL XRAY DETECTABLE X8314W

## (undated) DEVICE — SU SILK 2-0 TIE 18' A185H

## (undated) DEVICE — SU SILK 4-0 TIE 18' A183H

## (undated) DEVICE — LOOP RETRACT-O-TAPE 16GA X 18 QUEST 1012

## (undated) DEVICE — VESSEL LOOP BLUE MINI 30-713

## (undated) DEVICE — SUTURE VICRYL+ 3-0 27IN CT-1 UND VCP258H

## (undated) DEVICE — SYR 10ML LL W/O NDL 302995

## (undated) DEVICE — SU PROLENE 6-0 BV-1 18" 8806H

## (undated) RX ORDER — BUPIVACAINE HYDROCHLORIDE 2.5 MG/ML
INJECTION, SOLUTION EPIDURAL; INFILTRATION; INTRACAUDAL
Status: DISPENSED
Start: 2021-01-01

## (undated) RX ORDER — HEPARIN SODIUM 1000 [USP'U]/ML
INJECTION, SOLUTION INTRAVENOUS; SUBCUTANEOUS
Status: DISPENSED
Start: 2021-01-01

## (undated) RX ORDER — LIDOCAINE HYDROCHLORIDE 10 MG/ML
INJECTION, SOLUTION EPIDURAL; INFILTRATION; INTRACAUDAL; PERINEURAL
Status: DISPENSED
Start: 2021-01-01